# Patient Record
Sex: FEMALE | Race: WHITE | NOT HISPANIC OR LATINO
[De-identification: names, ages, dates, MRNs, and addresses within clinical notes are randomized per-mention and may not be internally consistent; named-entity substitution may affect disease eponyms.]

---

## 2022-01-01 ENCOUNTER — TRANSCRIPTION ENCOUNTER (OUTPATIENT)
Age: 82
End: 2022-01-01

## 2022-01-01 ENCOUNTER — APPOINTMENT (OUTPATIENT)
Dept: WOUND CARE | Facility: CLINIC | Age: 82
End: 2022-01-01

## 2022-01-01 ENCOUNTER — INPATIENT (INPATIENT)
Facility: HOSPITAL | Age: 82
LOS: 18 days | DRG: 853 | End: 2022-07-19
Attending: INTERNAL MEDICINE | Admitting: UROLOGY
Payer: MEDICARE

## 2022-01-01 VITALS
DIASTOLIC BLOOD PRESSURE: 45 MMHG | RESPIRATION RATE: 16 BRPM | OXYGEN SATURATION: 95 % | TEMPERATURE: 97 F | HEART RATE: 60 BPM | SYSTOLIC BLOOD PRESSURE: 65 MMHG

## 2022-01-01 VITALS
DIASTOLIC BLOOD PRESSURE: 76 MMHG | HEART RATE: 102 BPM | HEIGHT: 63 IN | SYSTOLIC BLOOD PRESSURE: 103 MMHG | BODY MASS INDEX: 31.89 KG/M2 | WEIGHT: 180 LBS | RESPIRATION RATE: 20 BRPM

## 2022-01-01 VITALS
DIASTOLIC BLOOD PRESSURE: 85 MMHG | OXYGEN SATURATION: 98 % | HEART RATE: 83 BPM | SYSTOLIC BLOOD PRESSURE: 143 MMHG | RESPIRATION RATE: 18 BRPM | TEMPERATURE: 99 F

## 2022-01-01 DIAGNOSIS — Z71.89 OTHER SPECIFIED COUNSELING: ICD-10-CM

## 2022-01-01 DIAGNOSIS — S81.801A UNSPECIFIED OPEN WOUND, RIGHT LOWER LEG, INITIAL ENCOUNTER: ICD-10-CM

## 2022-01-01 DIAGNOSIS — I10 ESSENTIAL (PRIMARY) HYPERTENSION: ICD-10-CM

## 2022-01-01 DIAGNOSIS — R41.82 ALTERED MENTAL STATUS, UNSPECIFIED: ICD-10-CM

## 2022-01-01 DIAGNOSIS — S89.90XA UNSPECIFIED INJURY OF UNSPECIFIED LOWER LEG, INITIAL ENCOUNTER: ICD-10-CM

## 2022-01-01 DIAGNOSIS — N20.0 CALCULUS OF KIDNEY: ICD-10-CM

## 2022-01-01 DIAGNOSIS — J69.0 PNEUMONITIS DUE TO INHALATION OF FOOD AND VOMIT: ICD-10-CM

## 2022-01-01 DIAGNOSIS — R14.0 ABDOMINAL DISTENSION (GASEOUS): ICD-10-CM

## 2022-01-01 DIAGNOSIS — Z78.9 OTHER SPECIFIED HEALTH STATUS: ICD-10-CM

## 2022-01-01 DIAGNOSIS — E87.1 HYPO-OSMOLALITY AND HYPONATREMIA: ICD-10-CM

## 2022-01-01 DIAGNOSIS — N20.1 CALCULUS OF URETER: ICD-10-CM

## 2022-01-01 DIAGNOSIS — Z51.5 ENCOUNTER FOR PALLIATIVE CARE: ICD-10-CM

## 2022-01-01 DIAGNOSIS — N39.0 URINARY TRACT INFECTION, SITE NOT SPECIFIED: ICD-10-CM

## 2022-01-01 DIAGNOSIS — Z87.898 PERSONAL HISTORY OF OTHER SPECIFIED CONDITIONS: ICD-10-CM

## 2022-01-01 DIAGNOSIS — J90 PLEURAL EFFUSION, NOT ELSEWHERE CLASSIFIED: ICD-10-CM

## 2022-01-01 DIAGNOSIS — Z87.442 PERSONAL HISTORY OF URINARY CALCULI: ICD-10-CM

## 2022-01-01 DIAGNOSIS — Z99.3 DEPENDENCE ON WHEELCHAIR: ICD-10-CM

## 2022-01-01 LAB
-  AMIKACIN: SIGNIFICANT CHANGE UP
-  AMOXICILLIN/CLAVULANIC ACID: SIGNIFICANT CHANGE UP
-  AMPICILLIN/SULBACTAM: SIGNIFICANT CHANGE UP
-  AMPICILLIN: SIGNIFICANT CHANGE UP
-  AZTREONAM: SIGNIFICANT CHANGE UP
-  CEFAZOLIN: SIGNIFICANT CHANGE UP
-  CEFEPIME: SIGNIFICANT CHANGE UP
-  CEFOXITIN: SIGNIFICANT CHANGE UP
-  CEFTRIAXONE: SIGNIFICANT CHANGE UP
-  CIPROFLOXACIN: SIGNIFICANT CHANGE UP
-  ERTAPENEM: SIGNIFICANT CHANGE UP
-  GENTAMICIN: SIGNIFICANT CHANGE UP
-  IMIPENEM: SIGNIFICANT CHANGE UP
-  LEVOFLOXACIN: SIGNIFICANT CHANGE UP
-  MEROPENEM: SIGNIFICANT CHANGE UP
-  NITROFURANTOIN: SIGNIFICANT CHANGE UP
-  NITROFURANTOIN: SIGNIFICANT CHANGE UP
-  PIPERACILLIN/TAZOBACTAM: SIGNIFICANT CHANGE UP
-  TETRACYCLINE: SIGNIFICANT CHANGE UP
-  TETRACYCLINE: SIGNIFICANT CHANGE UP
-  TIGECYCLINE: SIGNIFICANT CHANGE UP
-  TOBRAMYCIN: SIGNIFICANT CHANGE UP
-  TRIMETHOPRIM/SULFAMETHOXAZOLE: SIGNIFICANT CHANGE UP
-  VANCOMYCIN: SIGNIFICANT CHANGE UP
-  VANCOMYCIN: SIGNIFICANT CHANGE UP
A1C WITH ESTIMATED AVERAGE GLUCOSE RESULT: 4.9 % — SIGNIFICANT CHANGE UP (ref 4–5.6)
ALBUMIN SERPL ELPH-MCNC: 1.2 G/DL — LOW (ref 3.3–5)
ALBUMIN SERPL ELPH-MCNC: 2.2 G/DL — LOW (ref 3.3–5)
ALP SERPL-CCNC: 119 U/L — SIGNIFICANT CHANGE UP (ref 40–120)
ALP SERPL-CCNC: 129 U/L — HIGH (ref 40–120)
ALT FLD-CCNC: 16 U/L — SIGNIFICANT CHANGE UP (ref 10–45)
ALT FLD-CCNC: 28 U/L — SIGNIFICANT CHANGE UP (ref 10–45)
ANION GAP SERPL CALC-SCNC: 10 MMOL/L — SIGNIFICANT CHANGE UP (ref 5–17)
ANION GAP SERPL CALC-SCNC: 11 MMOL/L — SIGNIFICANT CHANGE UP (ref 5–17)
ANION GAP SERPL CALC-SCNC: 12 MMOL/L — SIGNIFICANT CHANGE UP (ref 5–17)
ANION GAP SERPL CALC-SCNC: 13 MMOL/L — SIGNIFICANT CHANGE UP (ref 5–17)
ANION GAP SERPL CALC-SCNC: 14 MMOL/L — SIGNIFICANT CHANGE UP (ref 5–17)
ANION GAP SERPL CALC-SCNC: 15 MMOL/L — SIGNIFICANT CHANGE UP (ref 5–17)
ANION GAP SERPL CALC-SCNC: 17 MMOL/L — SIGNIFICANT CHANGE UP (ref 5–17)
ANION GAP SERPL CALC-SCNC: 8 MMOL/L — SIGNIFICANT CHANGE UP (ref 5–17)
ANION GAP SERPL CALC-SCNC: 9 MMOL/L — SIGNIFICANT CHANGE UP (ref 5–17)
APPEARANCE UR: ABNORMAL
APPEARANCE UR: ABNORMAL
APTT BLD: 28 SEC — SIGNIFICANT CHANGE UP (ref 27.5–35.5)
AST SERPL-CCNC: 25 U/L — SIGNIFICANT CHANGE UP (ref 10–40)
AST SERPL-CCNC: 30 U/L — SIGNIFICANT CHANGE UP (ref 10–40)
BACTERIA # UR AUTO: ABNORMAL
BASOPHILS # BLD AUTO: 0.03 K/UL — SIGNIFICANT CHANGE UP (ref 0–0.2)
BASOPHILS # BLD AUTO: 0.05 K/UL — SIGNIFICANT CHANGE UP (ref 0–0.2)
BASOPHILS # BLD AUTO: 0.05 K/UL — SIGNIFICANT CHANGE UP (ref 0–0.2)
BASOPHILS NFR BLD AUTO: 0.1 % — SIGNIFICANT CHANGE UP (ref 0–2)
BASOPHILS NFR BLD AUTO: 0.4 % — SIGNIFICANT CHANGE UP (ref 0–2)
BASOPHILS NFR BLD AUTO: 0.4 % — SIGNIFICANT CHANGE UP (ref 0–2)
BILIRUB SERPL-MCNC: 0.3 MG/DL — SIGNIFICANT CHANGE UP (ref 0.2–1.2)
BILIRUB SERPL-MCNC: 0.4 MG/DL — SIGNIFICANT CHANGE UP (ref 0.2–1.2)
BILIRUB UR-MCNC: NEGATIVE — SIGNIFICANT CHANGE UP
BILIRUB UR-MCNC: NEGATIVE — SIGNIFICANT CHANGE UP
BLD GP AB SCN SERPL QL: NEGATIVE — SIGNIFICANT CHANGE UP
BUN SERPL-MCNC: 10 MG/DL — SIGNIFICANT CHANGE UP (ref 7–23)
BUN SERPL-MCNC: 10 MG/DL — SIGNIFICANT CHANGE UP (ref 7–23)
BUN SERPL-MCNC: 17 MG/DL — SIGNIFICANT CHANGE UP (ref 7–23)
BUN SERPL-MCNC: 20 MG/DL — SIGNIFICANT CHANGE UP (ref 7–23)
BUN SERPL-MCNC: 30 MG/DL — HIGH (ref 7–23)
BUN SERPL-MCNC: 34 MG/DL — HIGH (ref 7–23)
BUN SERPL-MCNC: 37 MG/DL — HIGH (ref 7–23)
BUN SERPL-MCNC: 8 MG/DL — SIGNIFICANT CHANGE UP (ref 7–23)
BUN SERPL-MCNC: 9 MG/DL — SIGNIFICANT CHANGE UP (ref 7–23)
CALCIUM SERPL-MCNC: 7.5 MG/DL — LOW (ref 8.4–10.5)
CALCIUM SERPL-MCNC: 7.5 MG/DL — LOW (ref 8.4–10.5)
CALCIUM SERPL-MCNC: 7.6 MG/DL — LOW (ref 8.4–10.5)
CALCIUM SERPL-MCNC: 7.7 MG/DL — LOW (ref 8.4–10.5)
CALCIUM SERPL-MCNC: 7.8 MG/DL — LOW (ref 8.4–10.5)
CALCIUM SERPL-MCNC: 8.1 MG/DL — LOW (ref 8.4–10.5)
CALCIUM SERPL-MCNC: 8.3 MG/DL — LOW (ref 8.4–10.5)
CALCIUM SERPL-MCNC: 8.4 MG/DL — SIGNIFICANT CHANGE UP (ref 8.4–10.5)
CALCIUM SERPL-MCNC: 8.5 MG/DL — SIGNIFICANT CHANGE UP (ref 8.4–10.5)
CALCIUM SERPL-MCNC: 9 MG/DL — SIGNIFICANT CHANGE UP (ref 8.4–10.5)
CALCIUM SERPL-MCNC: 9.5 MG/DL — SIGNIFICANT CHANGE UP (ref 8.4–10.5)
CALCIUM SERPL-MCNC: <3 MG/DL — CRITICAL LOW (ref 8.4–10.5)
CHLORIDE SERPL-SCNC: 101 MMOL/L — SIGNIFICANT CHANGE UP (ref 96–108)
CHLORIDE SERPL-SCNC: 103 MMOL/L — SIGNIFICANT CHANGE UP (ref 96–108)
CHLORIDE SERPL-SCNC: 103 MMOL/L — SIGNIFICANT CHANGE UP (ref 96–108)
CHLORIDE SERPL-SCNC: 104 MMOL/L — SIGNIFICANT CHANGE UP (ref 96–108)
CHLORIDE SERPL-SCNC: 104 MMOL/L — SIGNIFICANT CHANGE UP (ref 96–108)
CHLORIDE SERPL-SCNC: 95 MMOL/L — LOW (ref 96–108)
CHLORIDE SERPL-SCNC: 96 MMOL/L — SIGNIFICANT CHANGE UP (ref 96–108)
CHLORIDE SERPL-SCNC: 96 MMOL/L — SIGNIFICANT CHANGE UP (ref 96–108)
CHLORIDE SERPL-SCNC: 97 MMOL/L — SIGNIFICANT CHANGE UP (ref 96–108)
CHLORIDE SERPL-SCNC: 97 MMOL/L — SIGNIFICANT CHANGE UP (ref 96–108)
CHLORIDE SERPL-SCNC: 98 MMOL/L — SIGNIFICANT CHANGE UP (ref 96–108)
CHLORIDE SERPL-SCNC: 99 MMOL/L — SIGNIFICANT CHANGE UP (ref 96–108)
CO2 SERPL-SCNC: 11 MMOL/L — LOW (ref 22–31)
CO2 SERPL-SCNC: 13 MMOL/L — LOW (ref 22–31)
CO2 SERPL-SCNC: 13 MMOL/L — LOW (ref 22–31)
CO2 SERPL-SCNC: 14 MMOL/L — LOW (ref 22–31)
CO2 SERPL-SCNC: 14 MMOL/L — LOW (ref 22–31)
CO2 SERPL-SCNC: 15 MMOL/L — LOW (ref 22–31)
CO2 SERPL-SCNC: 15 MMOL/L — LOW (ref 22–31)
CO2 SERPL-SCNC: 16 MMOL/L — LOW (ref 22–31)
CO2 SERPL-SCNC: 16 MMOL/L — LOW (ref 22–31)
CO2 SERPL-SCNC: 17 MMOL/L — LOW (ref 22–31)
CO2 SERPL-SCNC: 18 MMOL/L — LOW (ref 22–31)
CO2 SERPL-SCNC: 18 MMOL/L — LOW (ref 22–31)
CO2 SERPL-SCNC: 19 MMOL/L — LOW (ref 22–31)
CO2 SERPL-SCNC: 20 MMOL/L — LOW (ref 22–31)
CO2 SERPL-SCNC: 20 MMOL/L — LOW (ref 22–31)
COLOR SPEC: YELLOW — SIGNIFICANT CHANGE UP
COLOR SPEC: YELLOW — SIGNIFICANT CHANGE UP
COMMENT - URINE: SIGNIFICANT CHANGE UP
CORTIS AM PEAK SERPL-MCNC: 29 UG/DL — HIGH (ref 6–18.4)
CREAT SERPL-MCNC: 0.48 MG/DL — LOW (ref 0.5–1.3)
CREAT SERPL-MCNC: 0.49 MG/DL — LOW (ref 0.5–1.3)
CREAT SERPL-MCNC: 0.51 MG/DL — SIGNIFICANT CHANGE UP (ref 0.5–1.3)
CREAT SERPL-MCNC: 0.51 MG/DL — SIGNIFICANT CHANGE UP (ref 0.5–1.3)
CREAT SERPL-MCNC: 0.52 MG/DL — SIGNIFICANT CHANGE UP (ref 0.5–1.3)
CREAT SERPL-MCNC: 0.54 MG/DL — SIGNIFICANT CHANGE UP (ref 0.5–1.3)
CREAT SERPL-MCNC: 0.55 MG/DL — SIGNIFICANT CHANGE UP (ref 0.5–1.3)
CREAT SERPL-MCNC: 0.55 MG/DL — SIGNIFICANT CHANGE UP (ref 0.5–1.3)
CREAT SERPL-MCNC: 0.58 MG/DL — SIGNIFICANT CHANGE UP (ref 0.5–1.3)
CREAT SERPL-MCNC: 0.62 MG/DL — SIGNIFICANT CHANGE UP (ref 0.5–1.3)
CREAT SERPL-MCNC: 0.64 MG/DL — SIGNIFICANT CHANGE UP (ref 0.5–1.3)
CREAT SERPL-MCNC: 0.64 MG/DL — SIGNIFICANT CHANGE UP (ref 0.5–1.3)
CREAT SERPL-MCNC: 0.67 MG/DL — SIGNIFICANT CHANGE UP (ref 0.5–1.3)
CREAT SERPL-MCNC: 0.68 MG/DL — SIGNIFICANT CHANGE UP (ref 0.5–1.3)
CREAT SERPL-MCNC: 0.69 MG/DL — SIGNIFICANT CHANGE UP (ref 0.5–1.3)
CREAT SERPL-MCNC: 0.78 MG/DL — SIGNIFICANT CHANGE UP (ref 0.5–1.3)
CREAT SERPL-MCNC: 1.19 MG/DL — SIGNIFICANT CHANGE UP (ref 0.5–1.3)
CULTURE RESULTS: SIGNIFICANT CHANGE UP
DIFF PNL FLD: ABNORMAL
DIFF PNL FLD: ABNORMAL
EGFR: 47 ML/MIN/1.73M2 — LOW
EGFR: 77 ML/MIN/1.73M2 — SIGNIFICANT CHANGE UP
EGFR: 88 ML/MIN/1.73M2 — SIGNIFICANT CHANGE UP
EGFR: 89 ML/MIN/1.73M2 — SIGNIFICANT CHANGE UP
EGFR: 89 ML/MIN/1.73M2 — SIGNIFICANT CHANGE UP
EGFR: 90 ML/MIN/1.73M2 — SIGNIFICANT CHANGE UP
EGFR: 90 ML/MIN/1.73M2 — SIGNIFICANT CHANGE UP
EGFR: 91 ML/MIN/1.73M2 — SIGNIFICANT CHANGE UP
EGFR: 92 ML/MIN/1.73M2 — SIGNIFICANT CHANGE UP
EGFR: 93 ML/MIN/1.73M2 — SIGNIFICANT CHANGE UP
EGFR: 93 ML/MIN/1.73M2 — SIGNIFICANT CHANGE UP
EGFR: 94 ML/MIN/1.73M2 — SIGNIFICANT CHANGE UP
EGFR: 94 ML/MIN/1.73M2 — SIGNIFICANT CHANGE UP
EGFR: 95 ML/MIN/1.73M2 — SIGNIFICANT CHANGE UP
EGFR: 95 ML/MIN/1.73M2 — SIGNIFICANT CHANGE UP
EGFR: 96 ML/MIN/1.73M2 — SIGNIFICANT CHANGE UP
EGFR: 96 ML/MIN/1.73M2 — SIGNIFICANT CHANGE UP
EOSINOPHIL # BLD AUTO: 0 K/UL — SIGNIFICANT CHANGE UP (ref 0–0.5)
EOSINOPHIL # BLD AUTO: 0.07 K/UL — SIGNIFICANT CHANGE UP (ref 0–0.5)
EOSINOPHIL # BLD AUTO: 0.11 K/UL — SIGNIFICANT CHANGE UP (ref 0–0.5)
EOSINOPHIL NFR BLD AUTO: 0 % — SIGNIFICANT CHANGE UP (ref 0–6)
EOSINOPHIL NFR BLD AUTO: 0.5 % — SIGNIFICANT CHANGE UP (ref 0–6)
EOSINOPHIL NFR BLD AUTO: 0.9 % — SIGNIFICANT CHANGE UP (ref 0–6)
EPI CELLS # UR: 3 /HPF — SIGNIFICANT CHANGE UP
ESTIMATED AVERAGE GLUCOSE: 94 MG/DL — SIGNIFICANT CHANGE UP (ref 68–114)
GAS PNL BLDA: SIGNIFICANT CHANGE UP
GLUCOSE BLDC GLUCOMTR-MCNC: 119 MG/DL — HIGH (ref 70–99)
GLUCOSE BLDC GLUCOMTR-MCNC: 206 MG/DL — HIGH (ref 70–99)
GLUCOSE BLDC GLUCOMTR-MCNC: 56 MG/DL — LOW (ref 70–99)
GLUCOSE BLDC GLUCOMTR-MCNC: 62 MG/DL — LOW (ref 70–99)
GLUCOSE BLDC GLUCOMTR-MCNC: 64 MG/DL — LOW (ref 70–99)
GLUCOSE BLDC GLUCOMTR-MCNC: 66 MG/DL — LOW (ref 70–99)
GLUCOSE BLDC GLUCOMTR-MCNC: 84 MG/DL — SIGNIFICANT CHANGE UP (ref 70–99)
GLUCOSE BLDC GLUCOMTR-MCNC: 85 MG/DL — SIGNIFICANT CHANGE UP (ref 70–99)
GLUCOSE BLDC GLUCOMTR-MCNC: 88 MG/DL — SIGNIFICANT CHANGE UP (ref 70–99)
GLUCOSE BLDC GLUCOMTR-MCNC: 94 MG/DL — SIGNIFICANT CHANGE UP (ref 70–99)
GLUCOSE BLDC GLUCOMTR-MCNC: 97 MG/DL — SIGNIFICANT CHANGE UP (ref 70–99)
GLUCOSE SERPL-MCNC: 104 MG/DL — HIGH (ref 70–99)
GLUCOSE SERPL-MCNC: 118 MG/DL — HIGH (ref 70–99)
GLUCOSE SERPL-MCNC: 120 MG/DL — HIGH (ref 70–99)
GLUCOSE SERPL-MCNC: 127 MG/DL — HIGH (ref 70–99)
GLUCOSE SERPL-MCNC: 128 MG/DL — HIGH (ref 70–99)
GLUCOSE SERPL-MCNC: 139 MG/DL — HIGH (ref 70–99)
GLUCOSE SERPL-MCNC: 45 MG/DL — CRITICAL LOW (ref 70–99)
GLUCOSE SERPL-MCNC: 49 MG/DL — CRITICAL LOW (ref 70–99)
GLUCOSE SERPL-MCNC: 57 MG/DL — LOW (ref 70–99)
GLUCOSE SERPL-MCNC: 59 MG/DL — LOW (ref 70–99)
GLUCOSE SERPL-MCNC: 72 MG/DL — SIGNIFICANT CHANGE UP (ref 70–99)
GLUCOSE SERPL-MCNC: 77 MG/DL — SIGNIFICANT CHANGE UP (ref 70–99)
GLUCOSE SERPL-MCNC: 85 MG/DL — SIGNIFICANT CHANGE UP (ref 70–99)
GLUCOSE SERPL-MCNC: 87 MG/DL — SIGNIFICANT CHANGE UP (ref 70–99)
GLUCOSE SERPL-MCNC: 88 MG/DL — SIGNIFICANT CHANGE UP (ref 70–99)
GLUCOSE SERPL-MCNC: 90 MG/DL — SIGNIFICANT CHANGE UP (ref 70–99)
GLUCOSE SERPL-MCNC: 93 MG/DL — SIGNIFICANT CHANGE UP (ref 70–99)
GLUCOSE UR QL: NEGATIVE — SIGNIFICANT CHANGE UP
GLUCOSE UR QL: NEGATIVE — SIGNIFICANT CHANGE UP
HCT VFR BLD CALC: 29.5 % — LOW (ref 34.5–45)
HCT VFR BLD CALC: 31.5 % — LOW (ref 34.5–45)
HCT VFR BLD CALC: 32.8 % — LOW (ref 34.5–45)
HCT VFR BLD CALC: 33 % — LOW (ref 34.5–45)
HCT VFR BLD CALC: 33.9 % — LOW (ref 34.5–45)
HCT VFR BLD CALC: 34 % — LOW (ref 34.5–45)
HCT VFR BLD CALC: 39 % — SIGNIFICANT CHANGE UP (ref 34.5–45)
HCT VFR BLD CALC: 39.9 % — SIGNIFICANT CHANGE UP (ref 34.5–45)
HCT VFR BLD CALC: 41.1 % — SIGNIFICANT CHANGE UP (ref 34.5–45)
HCT VFR BLD CALC: 43.9 % — SIGNIFICANT CHANGE UP (ref 34.5–45)
HCT VFR BLD CALC: SIGNIFICANT CHANGE UP (ref 34.5–45)
HGB BLD-MCNC: 10.8 G/DL — LOW (ref 11.5–15.5)
HGB BLD-MCNC: 10.8 G/DL — LOW (ref 11.5–15.5)
HGB BLD-MCNC: 11.6 G/DL — SIGNIFICANT CHANGE UP (ref 11.5–15.5)
HGB BLD-MCNC: 11.8 G/DL — SIGNIFICANT CHANGE UP (ref 11.5–15.5)
HGB BLD-MCNC: 12 G/DL — SIGNIFICANT CHANGE UP (ref 11.5–15.5)
HGB BLD-MCNC: 12.2 G/DL — SIGNIFICANT CHANGE UP (ref 11.5–15.5)
HGB BLD-MCNC: 12.4 G/DL — SIGNIFICANT CHANGE UP (ref 11.5–15.5)
HGB BLD-MCNC: 12.7 G/DL — SIGNIFICANT CHANGE UP (ref 11.5–15.5)
HGB BLD-MCNC: 12.9 G/DL — SIGNIFICANT CHANGE UP (ref 11.5–15.5)
HGB BLD-MCNC: 13.5 G/DL — SIGNIFICANT CHANGE UP (ref 11.5–15.5)
HGB BLD-MCNC: 14.5 G/DL — SIGNIFICANT CHANGE UP (ref 11.5–15.5)
HYALINE CASTS # UR AUTO: 0 /LPF — SIGNIFICANT CHANGE UP (ref 0–2)
IMM GRANULOCYTES NFR BLD AUTO: 0.5 % — SIGNIFICANT CHANGE UP (ref 0–1.5)
IMM GRANULOCYTES NFR BLD AUTO: 0.8 % — SIGNIFICANT CHANGE UP (ref 0–1.5)
IMM GRANULOCYTES NFR BLD AUTO: 0.8 % — SIGNIFICANT CHANGE UP (ref 0–1.5)
INR BLD: 1.02 RATIO — SIGNIFICANT CHANGE UP (ref 0.88–1.16)
KETONES UR-MCNC: NEGATIVE — SIGNIFICANT CHANGE UP
KETONES UR-MCNC: SIGNIFICANT CHANGE UP
LEUKOCYTE ESTERASE UR-ACNC: ABNORMAL
LEUKOCYTE ESTERASE UR-ACNC: ABNORMAL
LIDOCAIN IGE QN: 23 U/L — SIGNIFICANT CHANGE UP (ref 7–60)
LYMPHOCYTES # BLD AUTO: 1.54 K/UL — SIGNIFICANT CHANGE UP (ref 1–3.3)
LYMPHOCYTES # BLD AUTO: 1.9 K/UL — SIGNIFICANT CHANGE UP (ref 1–3.3)
LYMPHOCYTES # BLD AUTO: 15.5 % — SIGNIFICANT CHANGE UP (ref 13–44)
LYMPHOCYTES # BLD AUTO: 18.3 % — SIGNIFICANT CHANGE UP (ref 13–44)
LYMPHOCYTES # BLD AUTO: 2.4 K/UL — SIGNIFICANT CHANGE UP (ref 1–3.3)
LYMPHOCYTES # BLD AUTO: 7.6 % — LOW (ref 13–44)
MAGNESIUM SERPL-MCNC: 1 MG/DL — CRITICAL LOW (ref 1.6–2.6)
MAGNESIUM SERPL-MCNC: 1.4 MG/DL — LOW (ref 1.6–2.6)
MAGNESIUM SERPL-MCNC: 1.8 MG/DL — SIGNIFICANT CHANGE UP (ref 1.6–2.6)
MAGNESIUM SERPL-MCNC: 1.9 MG/DL — SIGNIFICANT CHANGE UP (ref 1.6–2.6)
MAGNESIUM SERPL-MCNC: 2.1 MG/DL — SIGNIFICANT CHANGE UP (ref 1.6–2.6)
MCHC RBC-ENTMCNC: 32.3 GM/DL — SIGNIFICANT CHANGE UP (ref 32–36)
MCHC RBC-ENTMCNC: 32.6 GM/DL — SIGNIFICANT CHANGE UP (ref 32–36)
MCHC RBC-ENTMCNC: 32.7 PG — SIGNIFICANT CHANGE UP (ref 27–34)
MCHC RBC-ENTMCNC: 32.8 GM/DL — SIGNIFICANT CHANGE UP (ref 32–36)
MCHC RBC-ENTMCNC: 32.9 GM/DL — SIGNIFICANT CHANGE UP (ref 32–36)
MCHC RBC-ENTMCNC: 32.9 PG — SIGNIFICANT CHANGE UP (ref 27–34)
MCHC RBC-ENTMCNC: 32.9 PG — SIGNIFICANT CHANGE UP (ref 27–34)
MCHC RBC-ENTMCNC: 33 GM/DL — SIGNIFICANT CHANGE UP (ref 32–36)
MCHC RBC-ENTMCNC: 33.2 PG — SIGNIFICANT CHANGE UP (ref 27–34)
MCHC RBC-ENTMCNC: 33.3 PG — SIGNIFICANT CHANGE UP (ref 27–34)
MCHC RBC-ENTMCNC: 33.4 PG — SIGNIFICANT CHANGE UP (ref 27–34)
MCHC RBC-ENTMCNC: 33.5 PG — SIGNIFICANT CHANGE UP (ref 27–34)
MCHC RBC-ENTMCNC: 33.6 PG — SIGNIFICANT CHANGE UP (ref 27–34)
MCHC RBC-ENTMCNC: 35.4 GM/DL — SIGNIFICANT CHANGE UP (ref 32–36)
MCHC RBC-ENTMCNC: 36.5 GM/DL — HIGH (ref 32–36)
MCHC RBC-ENTMCNC: 36.6 GM/DL — HIGH (ref 32–36)
MCHC RBC-ENTMCNC: 36.8 GM/DL — HIGH (ref 32–36)
MCHC RBC-ENTMCNC: 37 GM/DL — HIGH (ref 32–36)
MCHC RBC-ENTMCNC: SIGNIFICANT CHANGE UP (ref 27–34)
MCHC RBC-ENTMCNC: SIGNIFICANT CHANGE UP (ref 32–36)
MCV RBC AUTO: 100.2 FL — HIGH (ref 80–100)
MCV RBC AUTO: 101.9 FL — HIGH (ref 80–100)
MCV RBC AUTO: 102.1 FL — HIGH (ref 80–100)
MCV RBC AUTO: 103.4 FL — HIGH (ref 80–100)
MCV RBC AUTO: 89.9 FL — SIGNIFICANT CHANGE UP (ref 80–100)
MCV RBC AUTO: 90.2 FL — SIGNIFICANT CHANGE UP (ref 80–100)
MCV RBC AUTO: 91 FL — SIGNIFICANT CHANGE UP (ref 80–100)
MCV RBC AUTO: 91.3 FL — SIGNIFICANT CHANGE UP (ref 80–100)
MCV RBC AUTO: 93.9 FL — SIGNIFICANT CHANGE UP (ref 80–100)
MCV RBC AUTO: 99.1 FL — SIGNIFICANT CHANGE UP (ref 80–100)
MCV RBC AUTO: SIGNIFICANT CHANGE UP (ref 80–100)
METHOD TYPE: SIGNIFICANT CHANGE UP
MONOCYTES # BLD AUTO: 0.87 K/UL — SIGNIFICANT CHANGE UP (ref 0–0.9)
MONOCYTES # BLD AUTO: 0.88 K/UL — SIGNIFICANT CHANGE UP (ref 0–0.9)
MONOCYTES # BLD AUTO: 0.92 K/UL — HIGH (ref 0–0.9)
MONOCYTES NFR BLD AUTO: 4.3 % — SIGNIFICANT CHANGE UP (ref 2–14)
MONOCYTES NFR BLD AUTO: 7 % — SIGNIFICANT CHANGE UP (ref 2–14)
MONOCYTES NFR BLD AUTO: 7.2 % — SIGNIFICANT CHANGE UP (ref 2–14)
NEUTROPHILS # BLD AUTO: 17.71 K/UL — HIGH (ref 1.8–7.4)
NEUTROPHILS # BLD AUTO: 9.2 K/UL — HIGH (ref 1.8–7.4)
NEUTROPHILS # BLD AUTO: 9.6 K/UL — HIGH (ref 1.8–7.4)
NEUTROPHILS NFR BLD AUTO: 73 % — SIGNIFICANT CHANGE UP (ref 43–77)
NEUTROPHILS NFR BLD AUTO: 75.2 % — SIGNIFICANT CHANGE UP (ref 43–77)
NEUTROPHILS NFR BLD AUTO: 87.5 % — HIGH (ref 43–77)
NITRITE UR-MCNC: NEGATIVE — SIGNIFICANT CHANGE UP
NITRITE UR-MCNC: NEGATIVE — SIGNIFICANT CHANGE UP
NRBC # BLD: 0 /100 WBCS — SIGNIFICANT CHANGE UP (ref 0–0)
NT-PROBNP SERPL-SCNC: 1512 PG/ML — HIGH (ref 0–300)
ORGANISM # SPEC MICROSCOPIC CNT: SIGNIFICANT CHANGE UP
OSMOLALITY UR: 320 MOS/KG — SIGNIFICANT CHANGE UP (ref 300–900)
PH UR: 6 — SIGNIFICANT CHANGE UP (ref 5–8)
PH UR: 6 — SIGNIFICANT CHANGE UP (ref 5–8)
PHOSPHATE SERPL-MCNC: 1.4 MG/DL — LOW (ref 2.5–4.5)
PHOSPHATE SERPL-MCNC: 2.6 MG/DL — SIGNIFICANT CHANGE UP (ref 2.5–4.5)
PHOSPHATE SERPL-MCNC: 2.9 MG/DL — SIGNIFICANT CHANGE UP (ref 2.5–4.5)
PHOSPHATE SERPL-MCNC: 3.1 MG/DL — SIGNIFICANT CHANGE UP (ref 2.5–4.5)
PLATELET # BLD AUTO: 362 K/UL — SIGNIFICANT CHANGE UP (ref 150–400)
PLATELET # BLD AUTO: 380 K/UL — SIGNIFICANT CHANGE UP (ref 150–400)
PLATELET # BLD AUTO: 392 K/UL — SIGNIFICANT CHANGE UP (ref 150–400)
PLATELET # BLD AUTO: 395 K/UL — SIGNIFICANT CHANGE UP (ref 150–400)
PLATELET # BLD AUTO: 404 K/UL — HIGH (ref 150–400)
PLATELET # BLD AUTO: 425 K/UL — HIGH (ref 150–400)
PLATELET # BLD AUTO: 440 K/UL — HIGH (ref 150–400)
PLATELET # BLD AUTO: 457 K/UL — HIGH (ref 150–400)
PLATELET # BLD AUTO: 463 K/UL — HIGH (ref 150–400)
PLATELET # BLD AUTO: 591 K/UL — HIGH (ref 150–400)
PLATELET # BLD AUTO: 619 K/UL — HIGH (ref 150–400)
POTASSIUM SERPL-MCNC: 2.7 MMOL/L — CRITICAL LOW (ref 3.5–5.3)
POTASSIUM SERPL-MCNC: 2.7 MMOL/L — CRITICAL LOW (ref 3.5–5.3)
POTASSIUM SERPL-MCNC: 2.9 MMOL/L — CRITICAL LOW (ref 3.5–5.3)
POTASSIUM SERPL-MCNC: 3.1 MMOL/L — LOW (ref 3.5–5.3)
POTASSIUM SERPL-MCNC: 3.4 MMOL/L — LOW (ref 3.5–5.3)
POTASSIUM SERPL-MCNC: 3.5 MMOL/L — SIGNIFICANT CHANGE UP (ref 3.5–5.3)
POTASSIUM SERPL-MCNC: 3.5 MMOL/L — SIGNIFICANT CHANGE UP (ref 3.5–5.3)
POTASSIUM SERPL-MCNC: 3.6 MMOL/L — SIGNIFICANT CHANGE UP (ref 3.5–5.3)
POTASSIUM SERPL-MCNC: 3.7 MMOL/L — SIGNIFICANT CHANGE UP (ref 3.5–5.3)
POTASSIUM SERPL-MCNC: 3.8 MMOL/L — SIGNIFICANT CHANGE UP (ref 3.5–5.3)
POTASSIUM SERPL-MCNC: 3.9 MMOL/L — SIGNIFICANT CHANGE UP (ref 3.5–5.3)
POTASSIUM SERPL-MCNC: 3.9 MMOL/L — SIGNIFICANT CHANGE UP (ref 3.5–5.3)
POTASSIUM SERPL-MCNC: 4.5 MMOL/L — SIGNIFICANT CHANGE UP (ref 3.5–5.3)
POTASSIUM SERPL-MCNC: 4.6 MMOL/L — SIGNIFICANT CHANGE UP (ref 3.5–5.3)
POTASSIUM SERPL-MCNC: 4.7 MMOL/L — SIGNIFICANT CHANGE UP (ref 3.5–5.3)
POTASSIUM SERPL-MCNC: 4.8 MMOL/L — SIGNIFICANT CHANGE UP (ref 3.5–5.3)
POTASSIUM SERPL-MCNC: 8 MMOL/L — CRITICAL HIGH (ref 3.5–5.3)
POTASSIUM SERPL-SCNC: 2.7 MMOL/L — CRITICAL LOW (ref 3.5–5.3)
POTASSIUM SERPL-SCNC: 2.7 MMOL/L — CRITICAL LOW (ref 3.5–5.3)
POTASSIUM SERPL-SCNC: 2.9 MMOL/L — CRITICAL LOW (ref 3.5–5.3)
POTASSIUM SERPL-SCNC: 3.1 MMOL/L — LOW (ref 3.5–5.3)
POTASSIUM SERPL-SCNC: 3.4 MMOL/L — LOW (ref 3.5–5.3)
POTASSIUM SERPL-SCNC: 3.5 MMOL/L — SIGNIFICANT CHANGE UP (ref 3.5–5.3)
POTASSIUM SERPL-SCNC: 3.5 MMOL/L — SIGNIFICANT CHANGE UP (ref 3.5–5.3)
POTASSIUM SERPL-SCNC: 3.6 MMOL/L — SIGNIFICANT CHANGE UP (ref 3.5–5.3)
POTASSIUM SERPL-SCNC: 3.7 MMOL/L — SIGNIFICANT CHANGE UP (ref 3.5–5.3)
POTASSIUM SERPL-SCNC: 3.8 MMOL/L — SIGNIFICANT CHANGE UP (ref 3.5–5.3)
POTASSIUM SERPL-SCNC: 3.9 MMOL/L — SIGNIFICANT CHANGE UP (ref 3.5–5.3)
POTASSIUM SERPL-SCNC: 3.9 MMOL/L — SIGNIFICANT CHANGE UP (ref 3.5–5.3)
POTASSIUM SERPL-SCNC: 4.5 MMOL/L — SIGNIFICANT CHANGE UP (ref 3.5–5.3)
POTASSIUM SERPL-SCNC: 4.6 MMOL/L — SIGNIFICANT CHANGE UP (ref 3.5–5.3)
POTASSIUM SERPL-SCNC: 4.7 MMOL/L — SIGNIFICANT CHANGE UP (ref 3.5–5.3)
POTASSIUM SERPL-SCNC: 4.8 MMOL/L — SIGNIFICANT CHANGE UP (ref 3.5–5.3)
POTASSIUM SERPL-SCNC: 8 MMOL/L — CRITICAL HIGH (ref 3.5–5.3)
PROT SERPL-MCNC: 3.4 G/DL — LOW (ref 6–8.3)
PROT SERPL-MCNC: 5.1 G/DL — LOW (ref 6–8.3)
PROT UR-MCNC: ABNORMAL
PROT UR-MCNC: ABNORMAL
PROTHROM AB SERPL-ACNC: 11.7 SEC — SIGNIFICANT CHANGE UP (ref 10.5–13.4)
RBC # BLD: 3.22 M/UL — LOW (ref 3.8–5.2)
RBC # BLD: 3.24 M/UL — LOW (ref 3.8–5.2)
RBC # BLD: 3.45 M/UL — LOW (ref 3.8–5.2)
RBC # BLD: 3.61 M/UL — LOW (ref 3.8–5.2)
RBC # BLD: 3.67 M/UL — LOW (ref 3.8–5.2)
RBC # BLD: 3.77 M/UL — LOW (ref 3.8–5.2)
RBC # BLD: 3.82 M/UL — SIGNIFICANT CHANGE UP (ref 3.8–5.2)
RBC # BLD: 3.86 M/UL — SIGNIFICANT CHANGE UP (ref 3.8–5.2)
RBC # BLD: 4.1 M/UL — SIGNIFICANT CHANGE UP (ref 3.8–5.2)
RBC # BLD: 4.43 M/UL — SIGNIFICANT CHANGE UP (ref 3.8–5.2)
RBC # BLD: SIGNIFICANT CHANGE UP (ref 3.8–5.2)
RBC # FLD: 13.5 % — SIGNIFICANT CHANGE UP (ref 10.3–14.5)
RBC # FLD: 13.7 % — SIGNIFICANT CHANGE UP (ref 10.3–14.5)
RBC # FLD: 14.6 % — HIGH (ref 10.3–14.5)
RBC # FLD: 15.1 % — HIGH (ref 10.3–14.5)
RBC # FLD: 15.5 % — HIGH (ref 10.3–14.5)
RBC # FLD: 15.8 % — HIGH (ref 10.3–14.5)
RBC # FLD: 16 % — HIGH (ref 10.3–14.5)
RBC # FLD: 16.2 % — HIGH (ref 10.3–14.5)
RBC # FLD: SIGNIFICANT CHANGE UP (ref 10.3–14.5)
RBC CASTS # UR COMP ASSIST: 1 /HPF — SIGNIFICANT CHANGE UP (ref 0–4)
RH IG SCN BLD-IMP: NEGATIVE — SIGNIFICANT CHANGE UP
SARS-COV-2 RNA SPEC QL NAA+PROBE: SIGNIFICANT CHANGE UP
SODIUM SERPL-SCNC: 121 MMOL/L — LOW (ref 135–145)
SODIUM SERPL-SCNC: 122 MMOL/L — LOW (ref 135–145)
SODIUM SERPL-SCNC: 123 MMOL/L — LOW (ref 135–145)
SODIUM SERPL-SCNC: 124 MMOL/L — LOW (ref 135–145)
SODIUM SERPL-SCNC: 125 MMOL/L — LOW (ref 135–145)
SODIUM SERPL-SCNC: 126 MMOL/L — LOW (ref 135–145)
SODIUM SERPL-SCNC: 131 MMOL/L — LOW (ref 135–145)
SODIUM SERPL-SCNC: 132 MMOL/L — LOW (ref 135–145)
SODIUM SERPL-SCNC: 133 MMOL/L — LOW (ref 135–145)
SODIUM SERPL-SCNC: 137 MMOL/L — SIGNIFICANT CHANGE UP (ref 135–145)
SODIUM UR-SCNC: 34 MMOL/L — SIGNIFICANT CHANGE UP
SP GR SPEC: 1.02 — SIGNIFICANT CHANGE UP (ref 1.01–1.02)
SP GR SPEC: 1.02 — SIGNIFICANT CHANGE UP (ref 1.01–1.02)
SPECIMEN SOURCE: SIGNIFICANT CHANGE UP
TSH SERPL-MCNC: 6.11 UIU/ML — HIGH (ref 0.27–4.2)
UROBILINOGEN FLD QL: NEGATIVE — SIGNIFICANT CHANGE UP
UROBILINOGEN FLD QL: NEGATIVE — SIGNIFICANT CHANGE UP
WBC # BLD: 11.02 K/UL — HIGH (ref 3.8–10.5)
WBC # BLD: 12.24 K/UL — HIGH (ref 3.8–10.5)
WBC # BLD: 13.14 K/UL — HIGH (ref 3.8–10.5)
WBC # BLD: 13.28 K/UL — HIGH (ref 3.8–10.5)
WBC # BLD: 14.66 K/UL — HIGH (ref 3.8–10.5)
WBC # BLD: 15.2 K/UL — HIGH (ref 3.8–10.5)
WBC # BLD: 16.42 K/UL — HIGH (ref 3.8–10.5)
WBC # BLD: 16.52 K/UL — HIGH (ref 3.8–10.5)
WBC # BLD: 16.84 K/UL — HIGH (ref 3.8–10.5)
WBC # BLD: 16.97 K/UL — HIGH (ref 3.8–10.5)
WBC # BLD: 20.25 K/UL — HIGH (ref 3.8–10.5)
WBC # FLD AUTO: 11.02 K/UL — HIGH (ref 3.8–10.5)
WBC # FLD AUTO: 12.24 K/UL — HIGH (ref 3.8–10.5)
WBC # FLD AUTO: 13.14 K/UL — HIGH (ref 3.8–10.5)
WBC # FLD AUTO: 13.28 K/UL — HIGH (ref 3.8–10.5)
WBC # FLD AUTO: 14.66 K/UL — HIGH (ref 3.8–10.5)
WBC # FLD AUTO: 15.2 K/UL — HIGH (ref 3.8–10.5)
WBC # FLD AUTO: 16.42 K/UL — HIGH (ref 3.8–10.5)
WBC # FLD AUTO: 16.52 K/UL — HIGH (ref 3.8–10.5)
WBC # FLD AUTO: 16.84 K/UL — HIGH (ref 3.8–10.5)
WBC # FLD AUTO: 16.97 K/UL — HIGH (ref 3.8–10.5)
WBC # FLD AUTO: 20.25 K/UL — HIGH (ref 3.8–10.5)
WBC UR QL: >50

## 2022-01-01 PROCEDURE — 99285 EMERGENCY DEPT VISIT HI MDM: CPT | Mod: 25

## 2022-01-01 PROCEDURE — 83036 HEMOGLOBIN GLYCOSYLATED A1C: CPT

## 2022-01-01 PROCEDURE — 99232 SBSQ HOSP IP/OBS MODERATE 35: CPT

## 2022-01-01 PROCEDURE — U0003: CPT

## 2022-01-01 PROCEDURE — 83735 ASSAY OF MAGNESIUM: CPT

## 2022-01-01 PROCEDURE — 93306 TTE W/DOPPLER COMPLETE: CPT

## 2022-01-01 PROCEDURE — U0005: CPT

## 2022-01-01 PROCEDURE — 80048 BASIC METABOLIC PNL TOTAL CA: CPT

## 2022-01-01 PROCEDURE — 99232 SBSQ HOSP IP/OBS MODERATE 35: CPT | Mod: FS

## 2022-01-01 PROCEDURE — 93010 ELECTROCARDIOGRAM REPORT: CPT

## 2022-01-01 PROCEDURE — 11042 DBRDMT SUBQ TIS 1ST 20SQCM/<: CPT

## 2022-01-01 PROCEDURE — 71045 X-RAY EXAM CHEST 1 VIEW: CPT | Mod: 26

## 2022-01-01 PROCEDURE — 76000 FLUOROSCOPY <1 HR PHYS/QHP: CPT

## 2022-01-01 PROCEDURE — 74177 CT ABD & PELVIS W/CONTRAST: CPT | Mod: 26,MA

## 2022-01-01 PROCEDURE — 99285 EMERGENCY DEPT VISIT HI MDM: CPT | Mod: FS

## 2022-01-01 PROCEDURE — 99204 OFFICE O/P NEW MOD 45 MIN: CPT | Mod: 25

## 2022-01-01 PROCEDURE — 52332 CYSTOSCOPY AND TREATMENT: CPT | Mod: LT

## 2022-01-01 PROCEDURE — 36415 COLL VENOUS BLD VENIPUNCTURE: CPT

## 2022-01-01 PROCEDURE — 99497 ADVNCD CARE PLAN 30 MIN: CPT | Mod: 25

## 2022-01-01 PROCEDURE — 84100 ASSAY OF PHOSPHORUS: CPT

## 2022-01-01 PROCEDURE — 97110 THERAPEUTIC EXERCISES: CPT

## 2022-01-01 PROCEDURE — 84132 ASSAY OF SERUM POTASSIUM: CPT

## 2022-01-01 PROCEDURE — 99222 1ST HOSP IP/OBS MODERATE 55: CPT | Mod: FS

## 2022-01-01 PROCEDURE — 71250 CT THORAX DX C-: CPT

## 2022-01-01 PROCEDURE — 83880 ASSAY OF NATRIURETIC PEPTIDE: CPT

## 2022-01-01 PROCEDURE — 96374 THER/PROPH/DIAG INJ IV PUSH: CPT

## 2022-01-01 PROCEDURE — 86901 BLOOD TYPING SEROLOGIC RH(D): CPT

## 2022-01-01 PROCEDURE — 99233 SBSQ HOSP IP/OBS HIGH 50: CPT

## 2022-01-01 PROCEDURE — C1769: CPT

## 2022-01-01 PROCEDURE — 82947 ASSAY GLUCOSE BLOOD QUANT: CPT

## 2022-01-01 PROCEDURE — 85730 THROMBOPLASTIN TIME PARTIAL: CPT

## 2022-01-01 PROCEDURE — 87040 BLOOD CULTURE FOR BACTERIA: CPT

## 2022-01-01 PROCEDURE — 92610 EVALUATE SWALLOWING FUNCTION: CPT

## 2022-01-01 PROCEDURE — C2625: CPT

## 2022-01-01 PROCEDURE — 74177 CT ABD & PELVIS W/CONTRAST: CPT | Mod: MA

## 2022-01-01 PROCEDURE — 97161 PT EVAL LOW COMPLEX 20 MIN: CPT

## 2022-01-01 PROCEDURE — 99223 1ST HOSP IP/OBS HIGH 75: CPT

## 2022-01-01 PROCEDURE — 84443 ASSAY THYROID STIM HORMONE: CPT

## 2022-01-01 PROCEDURE — 71045 X-RAY EXAM CHEST 1 VIEW: CPT

## 2022-01-01 PROCEDURE — 80053 COMPREHEN METABOLIC PANEL: CPT

## 2022-01-01 PROCEDURE — 86900 BLOOD TYPING SEROLOGIC ABO: CPT

## 2022-01-01 PROCEDURE — 85018 HEMOGLOBIN: CPT

## 2022-01-01 PROCEDURE — 85025 COMPLETE CBC W/AUTO DIFF WBC: CPT

## 2022-01-01 PROCEDURE — 93306 TTE W/DOPPLER COMPLETE: CPT | Mod: 26

## 2022-01-01 PROCEDURE — 99231 SBSQ HOSP IP/OBS SF/LOW 25: CPT

## 2022-01-01 PROCEDURE — C1758: CPT

## 2022-01-01 PROCEDURE — 81001 URINALYSIS AUTO W/SCOPE: CPT

## 2022-01-01 PROCEDURE — 87077 CULTURE AEROBIC IDENTIFY: CPT

## 2022-01-01 PROCEDURE — 87086 URINE CULTURE/COLONY COUNT: CPT

## 2022-01-01 PROCEDURE — 93005 ELECTROCARDIOGRAM TRACING: CPT

## 2022-01-01 PROCEDURE — 82803 BLOOD GASES ANY COMBINATION: CPT

## 2022-01-01 PROCEDURE — 82962 GLUCOSE BLOOD TEST: CPT

## 2022-01-01 PROCEDURE — 84300 ASSAY OF URINE SODIUM: CPT

## 2022-01-01 PROCEDURE — 82533 TOTAL CORTISOL: CPT

## 2022-01-01 PROCEDURE — 85027 COMPLETE CBC AUTOMATED: CPT

## 2022-01-01 PROCEDURE — 96375 TX/PRO/DX INJ NEW DRUG ADDON: CPT

## 2022-01-01 PROCEDURE — 83605 ASSAY OF LACTIC ACID: CPT

## 2022-01-01 PROCEDURE — 71250 CT THORAX DX C-: CPT | Mod: 26

## 2022-01-01 PROCEDURE — 86850 RBC ANTIBODY SCREEN: CPT

## 2022-01-01 PROCEDURE — 85610 PROTHROMBIN TIME: CPT

## 2022-01-01 PROCEDURE — 99222 1ST HOSP IP/OBS MODERATE 55: CPT | Mod: 25,57

## 2022-01-01 PROCEDURE — 84295 ASSAY OF SERUM SODIUM: CPT

## 2022-01-01 PROCEDURE — 82435 ASSAY OF BLOOD CHLORIDE: CPT

## 2022-01-01 PROCEDURE — 82330 ASSAY OF CALCIUM: CPT

## 2022-01-01 PROCEDURE — 97530 THERAPEUTIC ACTIVITIES: CPT

## 2022-01-01 PROCEDURE — 87186 SC STD MICRODIL/AGAR DIL: CPT

## 2022-01-01 PROCEDURE — 85014 HEMATOCRIT: CPT

## 2022-01-01 PROCEDURE — 94640 AIRWAY INHALATION TREATMENT: CPT

## 2022-01-01 PROCEDURE — 83690 ASSAY OF LIPASE: CPT

## 2022-01-01 PROCEDURE — 83935 ASSAY OF URINE OSMOLALITY: CPT

## 2022-01-01 DEVICE — GUIDEWIRE SENSOR DUAL-FLEX NITINOL STRAIGHT .035" X 150CM: Type: IMPLANTABLE DEVICE | Site: LEFT | Status: FUNCTIONAL

## 2022-01-01 DEVICE — STENT URET 7FR 24CM: Type: IMPLANTABLE DEVICE | Site: LEFT | Status: FUNCTIONAL

## 2022-01-01 DEVICE — URETERAL CATH AXXCESS OPEN END 6FR 70CM: Type: IMPLANTABLE DEVICE | Site: LEFT | Status: FUNCTIONAL

## 2022-01-01 RX ORDER — POTASSIUM CHLORIDE 20 MEQ
40 PACKET (EA) ORAL ONCE
Refills: 0 | Status: COMPLETED | OUTPATIENT
Start: 2022-01-01 | End: 2022-01-01

## 2022-01-01 RX ORDER — SODIUM CHLORIDE 9 MG/ML
500 INJECTION, SOLUTION INTRAVENOUS ONCE
Refills: 0 | Status: COMPLETED | OUTPATIENT
Start: 2022-01-01 | End: 2022-01-01

## 2022-01-01 RX ORDER — MAGNESIUM SULFATE 500 MG/ML
2 VIAL (ML) INJECTION ONCE
Refills: 0 | Status: COMPLETED | OUTPATIENT
Start: 2022-01-01 | End: 2022-01-01

## 2022-01-01 RX ORDER — FUROSEMIDE 40 MG
40 TABLET ORAL ONCE
Refills: 0 | Status: COMPLETED | OUTPATIENT
Start: 2022-01-01 | End: 2022-01-01

## 2022-01-01 RX ORDER — ACETAMINOPHEN 500 MG
1000 TABLET ORAL ONCE
Refills: 0 | Status: DISCONTINUED | OUTPATIENT
Start: 2022-01-01 | End: 2022-01-01

## 2022-01-01 RX ORDER — POTASSIUM CHLORIDE 20 MEQ
20 PACKET (EA) ORAL
Refills: 0 | Status: COMPLETED | OUTPATIENT
Start: 2022-01-01 | End: 2022-01-01

## 2022-01-01 RX ORDER — SENNA PLUS 8.6 MG/1
2 TABLET ORAL AT BEDTIME
Refills: 0 | Status: DISCONTINUED | OUTPATIENT
Start: 2022-01-01 | End: 2022-01-01

## 2022-01-01 RX ORDER — SODIUM CHLORIDE 9 MG/ML
1000 INJECTION, SOLUTION INTRAVENOUS
Refills: 0 | Status: DISCONTINUED | OUTPATIENT
Start: 2022-01-01 | End: 2022-01-01

## 2022-01-01 RX ORDER — TRAMADOL HYDROCHLORIDE 50 MG/1
50 TABLET, COATED ORAL
Qty: 90 | Refills: 0 | Status: ACTIVE | COMMUNITY
Start: 2022-01-01

## 2022-01-01 RX ORDER — TOLVAPTAN 15 MG/1
15 TABLET ORAL ONCE
Refills: 0 | Status: COMPLETED | OUTPATIENT
Start: 2022-01-01 | End: 2022-01-01

## 2022-01-01 RX ORDER — PIPERACILLIN AND TAZOBACTAM 4; .5 G/20ML; G/20ML
3.38 INJECTION, POWDER, LYOPHILIZED, FOR SOLUTION INTRAVENOUS EVERY 8 HOURS
Refills: 0 | Status: DISCONTINUED | OUTPATIENT
Start: 2022-01-01 | End: 2022-01-01

## 2022-01-01 RX ORDER — FAMOTIDINE 10 MG/ML
20 INJECTION INTRAVENOUS
Refills: 0 | Status: DISCONTINUED | OUTPATIENT
Start: 2022-01-01 | End: 2022-01-01

## 2022-01-01 RX ORDER — DEXTROSE 50 % IN WATER 50 %
25 SYRINGE (ML) INTRAVENOUS ONCE
Refills: 0 | Status: DISCONTINUED | OUTPATIENT
Start: 2022-01-01 | End: 2022-01-01

## 2022-01-01 RX ORDER — SODIUM BICARBONATE 1 MEQ/ML
0.23 SYRINGE (ML) INTRAVENOUS
Qty: 75 | Refills: 0 | Status: DISCONTINUED | OUTPATIENT
Start: 2022-01-01 | End: 2022-01-01

## 2022-01-01 RX ORDER — SUCRALFATE 1 G
1 TABLET ORAL
Refills: 0 | Status: DISCONTINUED | OUTPATIENT
Start: 2022-01-01 | End: 2022-01-01

## 2022-01-01 RX ORDER — CEFTRIAXONE 500 MG/1
1000 INJECTION, POWDER, FOR SOLUTION INTRAMUSCULAR; INTRAVENOUS ONCE
Refills: 0 | Status: COMPLETED | OUTPATIENT
Start: 2022-01-01 | End: 2022-01-01

## 2022-01-01 RX ORDER — SODIUM BICARBONATE 1 MEQ/ML
650 SYRINGE (ML) INTRAVENOUS THREE TIMES A DAY
Refills: 0 | Status: DISCONTINUED | OUTPATIENT
Start: 2022-01-01 | End: 2022-01-01

## 2022-01-01 RX ORDER — ONDANSETRON 8 MG/1
4 TABLET, FILM COATED ORAL EVERY 6 HOURS
Refills: 0 | Status: DISCONTINUED | OUTPATIENT
Start: 2022-01-01 | End: 2022-01-01

## 2022-01-01 RX ORDER — PIPERACILLIN AND TAZOBACTAM 4; .5 G/20ML; G/20ML
3.38 INJECTION, POWDER, LYOPHILIZED, FOR SOLUTION INTRAVENOUS EVERY 8 HOURS
Refills: 0 | Status: COMPLETED | OUTPATIENT
Start: 2022-01-01 | End: 2022-01-01

## 2022-01-01 RX ORDER — SODIUM CHLORIDE 9 MG/ML
1000 INJECTION INTRAMUSCULAR; INTRAVENOUS; SUBCUTANEOUS ONCE
Refills: 0 | Status: COMPLETED | OUTPATIENT
Start: 2022-01-01 | End: 2022-01-01

## 2022-01-01 RX ORDER — POTASSIUM CHLORIDE 20 MEQ
40 PACKET (EA) ORAL EVERY 4 HOURS
Refills: 0 | Status: COMPLETED | OUTPATIENT
Start: 2022-01-01 | End: 2022-01-01

## 2022-01-01 RX ORDER — ONDANSETRON 8 MG/1
4 TABLET, FILM COATED ORAL ONCE
Refills: 0 | Status: COMPLETED | OUTPATIENT
Start: 2022-01-01 | End: 2022-01-01

## 2022-01-01 RX ORDER — BENZOCAINE AND MENTHOL 5; 1 G/100ML; G/100ML
1 LIQUID ORAL THREE TIMES A DAY
Refills: 0 | Status: DISCONTINUED | OUTPATIENT
Start: 2022-01-01 | End: 2022-01-01

## 2022-01-01 RX ORDER — POTASSIUM CHLORIDE 20 MEQ
20 PACKET (EA) ORAL ONCE
Refills: 0 | Status: COMPLETED | OUTPATIENT
Start: 2022-01-01 | End: 2022-01-01

## 2022-01-01 RX ORDER — SODIUM CHLORIDE 9 MG/ML
600 INJECTION INTRAMUSCULAR; INTRAVENOUS; SUBCUTANEOUS ONCE
Refills: 0 | Status: COMPLETED | OUTPATIENT
Start: 2022-01-01 | End: 2022-01-01

## 2022-01-01 RX ORDER — DEXTROSE 50 % IN WATER 50 %
12.5 SYRINGE (ML) INTRAVENOUS ONCE
Refills: 0 | Status: COMPLETED | OUTPATIENT
Start: 2022-01-01 | End: 2022-01-01

## 2022-01-01 RX ORDER — SODIUM CHLORIDE 9 MG/ML
250 INJECTION INTRAMUSCULAR; INTRAVENOUS; SUBCUTANEOUS ONCE
Refills: 0 | Status: DISCONTINUED | OUTPATIENT
Start: 2022-01-01 | End: 2022-01-01

## 2022-01-01 RX ORDER — IPRATROPIUM/ALBUTEROL SULFATE 18-103MCG
3 AEROSOL WITH ADAPTER (GRAM) INHALATION ONCE
Refills: 0 | Status: COMPLETED | OUTPATIENT
Start: 2022-01-01 | End: 2022-01-01

## 2022-01-01 RX ORDER — NIFEDIPINE 60 MG/1
60 TABLET, EXTENDED RELEASE ORAL
Qty: 90 | Refills: 0 | Status: ACTIVE | COMMUNITY
Start: 2022-01-01

## 2022-01-01 RX ORDER — POLYETHYLENE GLYCOL 3350 17 G/17G
17 POWDER, FOR SOLUTION ORAL DAILY
Refills: 0 | Status: DISCONTINUED | OUTPATIENT
Start: 2022-01-01 | End: 2022-01-01

## 2022-01-01 RX ORDER — SODIUM CHLORIDE 9 MG/ML
1000 INJECTION INTRAMUSCULAR; INTRAVENOUS; SUBCUTANEOUS
Refills: 0 | Status: DISCONTINUED | OUTPATIENT
Start: 2022-01-01 | End: 2022-01-01

## 2022-01-01 RX ORDER — TAMSULOSIN HYDROCHLORIDE 0.4 MG/1
0.4 CAPSULE ORAL DAILY
Refills: 0 | Status: DISCONTINUED | OUTPATIENT
Start: 2022-01-01 | End: 2022-01-01

## 2022-01-01 RX ORDER — DEXTROSE 50 % IN WATER 50 %
12.5 SYRINGE (ML) INTRAVENOUS ONCE
Refills: 0 | Status: DISCONTINUED | OUTPATIENT
Start: 2022-01-01 | End: 2022-01-01

## 2022-01-01 RX ORDER — HYDRALAZINE HYDROCHLORIDE 100 MG/1
100 TABLET ORAL
Qty: 270 | Refills: 0 | Status: ACTIVE | COMMUNITY
Start: 2022-01-01

## 2022-01-01 RX ORDER — METOCLOPRAMIDE HCL 10 MG
5 TABLET ORAL EVERY 6 HOURS
Refills: 0 | Status: DISCONTINUED | OUTPATIENT
Start: 2022-01-01 | End: 2022-01-01

## 2022-01-01 RX ORDER — DEXTROSE 50 % IN WATER 50 %
25 SYRINGE (ML) INTRAVENOUS ONCE
Refills: 0 | Status: COMPLETED | OUTPATIENT
Start: 2022-01-01 | End: 2022-01-01

## 2022-01-01 RX ORDER — POTASSIUM CHLORIDE 20 MEQ
10 PACKET (EA) ORAL
Refills: 0 | Status: COMPLETED | OUTPATIENT
Start: 2022-01-01 | End: 2022-01-01

## 2022-01-01 RX ORDER — PIPERACILLIN AND TAZOBACTAM 4; .5 G/20ML; G/20ML
3.38 INJECTION, POWDER, LYOPHILIZED, FOR SOLUTION INTRAVENOUS ONCE
Refills: 0 | Status: COMPLETED | OUTPATIENT
Start: 2022-01-01 | End: 2022-01-01

## 2022-01-01 RX ORDER — ACETAMINOPHEN 500 MG
650 TABLET ORAL EVERY 6 HOURS
Refills: 0 | Status: DISCONTINUED | OUTPATIENT
Start: 2022-01-01 | End: 2022-01-01

## 2022-01-01 RX ORDER — NYSTATIN CREAM 100000 [USP'U]/G
1 CREAM TOPICAL
Refills: 0 | Status: DISCONTINUED | OUTPATIENT
Start: 2022-01-01 | End: 2022-01-01

## 2022-01-01 RX ORDER — METOPROLOL TARTRATE 50 MG
12.5 TABLET ORAL
Refills: 0 | Status: DISCONTINUED | OUTPATIENT
Start: 2022-01-01 | End: 2022-01-01

## 2022-01-01 RX ORDER — DEXTROSE 50 % IN WATER 50 %
15 SYRINGE (ML) INTRAVENOUS ONCE
Refills: 0 | Status: DISCONTINUED | OUTPATIENT
Start: 2022-01-01 | End: 2022-01-01

## 2022-01-01 RX ORDER — DEXTROSE 50 % IN WATER 50 %
15 SYRINGE (ML) INTRAVENOUS ONCE
Refills: 0 | Status: COMPLETED | OUTPATIENT
Start: 2022-01-01 | End: 2022-01-01

## 2022-01-01 RX ORDER — POTASSIUM PHOSPHATE, MONOBASIC POTASSIUM PHOSPHATE, DIBASIC 236; 224 MG/ML; MG/ML
30 INJECTION, SOLUTION INTRAVENOUS ONCE
Refills: 0 | Status: COMPLETED | OUTPATIENT
Start: 2022-01-01 | End: 2022-01-01

## 2022-01-01 RX ORDER — HEPARIN SODIUM 5000 [USP'U]/ML
5000 INJECTION INTRAVENOUS; SUBCUTANEOUS EVERY 8 HOURS
Refills: 0 | Status: DISCONTINUED | OUTPATIENT
Start: 2022-01-01 | End: 2022-01-01

## 2022-01-01 RX ORDER — GLUCAGON INJECTION, SOLUTION 0.5 MG/.1ML
1 INJECTION, SOLUTION SUBCUTANEOUS ONCE
Refills: 0 | Status: DISCONTINUED | OUTPATIENT
Start: 2022-01-01 | End: 2022-01-01

## 2022-01-01 RX ADMIN — Medication 1 GRAM(S): at 17:48

## 2022-01-01 RX ADMIN — Medication 5 MILLIGRAM(S): at 05:28

## 2022-01-01 RX ADMIN — Medication 1 GRAM(S): at 14:08

## 2022-01-01 RX ADMIN — TAMSULOSIN HYDROCHLORIDE 0.4 MILLIGRAM(S): 0.4 CAPSULE ORAL at 13:11

## 2022-01-01 RX ADMIN — TAMSULOSIN HYDROCHLORIDE 0.4 MILLIGRAM(S): 0.4 CAPSULE ORAL at 11:10

## 2022-01-01 RX ADMIN — Medication 650 MILLIGRAM(S): at 21:19

## 2022-01-01 RX ADMIN — SENNA PLUS 2 TABLET(S): 8.6 TABLET ORAL at 23:03

## 2022-01-01 RX ADMIN — Medication 650 MILLIGRAM(S): at 13:30

## 2022-01-01 RX ADMIN — Medication 40 MILLIGRAM(S): at 17:30

## 2022-01-01 RX ADMIN — Medication 1 GRAM(S): at 05:01

## 2022-01-01 RX ADMIN — Medication 650 MILLIGRAM(S): at 21:20

## 2022-01-01 RX ADMIN — HEPARIN SODIUM 5000 UNIT(S): 5000 INJECTION INTRAVENOUS; SUBCUTANEOUS at 05:01

## 2022-01-01 RX ADMIN — Medication 600 MILLIGRAM(S): at 18:38

## 2022-01-01 RX ADMIN — ONDANSETRON 4 MILLIGRAM(S): 8 TABLET, FILM COATED ORAL at 04:59

## 2022-01-01 RX ADMIN — Medication 1 GRAM(S): at 00:29

## 2022-01-01 RX ADMIN — Medication 5 MILLIGRAM(S): at 15:38

## 2022-01-01 RX ADMIN — POLYETHYLENE GLYCOL 3350 17 GRAM(S): 17 POWDER, FOR SOLUTION ORAL at 11:06

## 2022-01-01 RX ADMIN — Medication 20 MILLIEQUIVALENT(S): at 09:17

## 2022-01-01 RX ADMIN — Medication 1 GRAM(S): at 13:30

## 2022-01-01 RX ADMIN — HEPARIN SODIUM 5000 UNIT(S): 5000 INJECTION INTRAVENOUS; SUBCUTANEOUS at 22:22

## 2022-01-01 RX ADMIN — Medication 5 MILLIGRAM(S): at 21:51

## 2022-01-01 RX ADMIN — Medication 5 MILLIGRAM(S): at 21:37

## 2022-01-01 RX ADMIN — Medication 650 MILLIGRAM(S): at 23:03

## 2022-01-01 RX ADMIN — PIPERACILLIN AND TAZOBACTAM 25 GRAM(S): 4; .5 INJECTION, POWDER, LYOPHILIZED, FOR SOLUTION INTRAVENOUS at 02:14

## 2022-01-01 RX ADMIN — Medication 5 MILLIGRAM(S): at 05:02

## 2022-01-01 RX ADMIN — HEPARIN SODIUM 5000 UNIT(S): 5000 INJECTION INTRAVENOUS; SUBCUTANEOUS at 23:02

## 2022-01-01 RX ADMIN — CEFTRIAXONE 100 MILLIGRAM(S): 500 INJECTION, POWDER, FOR SOLUTION INTRAMUSCULAR; INTRAVENOUS at 16:15

## 2022-01-01 RX ADMIN — Medication 1 GRAM(S): at 18:07

## 2022-01-01 RX ADMIN — TAMSULOSIN HYDROCHLORIDE 0.4 MILLIGRAM(S): 0.4 CAPSULE ORAL at 11:14

## 2022-01-01 RX ADMIN — PIPERACILLIN AND TAZOBACTAM 25 GRAM(S): 4; .5 INJECTION, POWDER, LYOPHILIZED, FOR SOLUTION INTRAVENOUS at 16:36

## 2022-01-01 RX ADMIN — SODIUM CHLORIDE 500 MILLILITER(S): 9 INJECTION INTRAMUSCULAR; INTRAVENOUS; SUBCUTANEOUS at 16:58

## 2022-01-01 RX ADMIN — ONDANSETRON 4 MILLIGRAM(S): 8 TABLET, FILM COATED ORAL at 12:27

## 2022-01-01 RX ADMIN — HEPARIN SODIUM 5000 UNIT(S): 5000 INJECTION INTRAVENOUS; SUBCUTANEOUS at 04:57

## 2022-01-01 RX ADMIN — TAMSULOSIN HYDROCHLORIDE 0.4 MILLIGRAM(S): 0.4 CAPSULE ORAL at 12:02

## 2022-01-01 RX ADMIN — SODIUM CHLORIDE 100 MILLILITER(S): 9 INJECTION, SOLUTION INTRAVENOUS at 12:29

## 2022-01-01 RX ADMIN — PIPERACILLIN AND TAZOBACTAM 25 GRAM(S): 4; .5 INJECTION, POWDER, LYOPHILIZED, FOR SOLUTION INTRAVENOUS at 11:05

## 2022-01-01 RX ADMIN — TAMSULOSIN HYDROCHLORIDE 0.4 MILLIGRAM(S): 0.4 CAPSULE ORAL at 12:43

## 2022-01-01 RX ADMIN — Medication 1 GRAM(S): at 18:47

## 2022-01-01 RX ADMIN — Medication 1 GRAM(S): at 05:14

## 2022-01-01 RX ADMIN — Medication 1 GRAM(S): at 18:30

## 2022-01-01 RX ADMIN — SENNA PLUS 2 TABLET(S): 8.6 TABLET ORAL at 21:24

## 2022-01-01 RX ADMIN — Medication 40 MILLIEQUIVALENT(S): at 18:45

## 2022-01-01 RX ADMIN — FAMOTIDINE 20 MILLIGRAM(S): 10 INJECTION INTRAVENOUS at 05:34

## 2022-01-01 RX ADMIN — FAMOTIDINE 20 MILLIGRAM(S): 10 INJECTION INTRAVENOUS at 17:28

## 2022-01-01 RX ADMIN — PIPERACILLIN AND TAZOBACTAM 25 GRAM(S): 4; .5 INJECTION, POWDER, LYOPHILIZED, FOR SOLUTION INTRAVENOUS at 02:42

## 2022-01-01 RX ADMIN — TAMSULOSIN HYDROCHLORIDE 0.4 MILLIGRAM(S): 0.4 CAPSULE ORAL at 13:29

## 2022-01-01 RX ADMIN — Medication 1 GRAM(S): at 00:04

## 2022-01-01 RX ADMIN — Medication 1 GRAM(S): at 04:57

## 2022-01-01 RX ADMIN — FAMOTIDINE 20 MILLIGRAM(S): 10 INJECTION INTRAVENOUS at 17:19

## 2022-01-01 RX ADMIN — Medication 650 MILLIGRAM(S): at 14:57

## 2022-01-01 RX ADMIN — Medication 1 GRAM(S): at 05:28

## 2022-01-01 RX ADMIN — NYSTATIN CREAM 1 APPLICATION(S): 100000 CREAM TOPICAL at 17:29

## 2022-01-01 RX ADMIN — PIPERACILLIN AND TAZOBACTAM 25 GRAM(S): 4; .5 INJECTION, POWDER, LYOPHILIZED, FOR SOLUTION INTRAVENOUS at 17:41

## 2022-01-01 RX ADMIN — Medication 1 GRAM(S): at 05:49

## 2022-01-01 RX ADMIN — Medication 5 MILLIGRAM(S): at 21:19

## 2022-01-01 RX ADMIN — HEPARIN SODIUM 5000 UNIT(S): 5000 INJECTION INTRAVENOUS; SUBCUTANEOUS at 13:10

## 2022-01-01 RX ADMIN — Medication 5 MILLIGRAM(S): at 10:13

## 2022-01-01 RX ADMIN — Medication 650 MILLIGRAM(S): at 15:51

## 2022-01-01 RX ADMIN — Medication 5 MILLIGRAM(S): at 14:38

## 2022-01-01 RX ADMIN — HEPARIN SODIUM 5000 UNIT(S): 5000 INJECTION INTRAVENOUS; SUBCUTANEOUS at 05:38

## 2022-01-01 RX ADMIN — Medication 5 MILLIGRAM(S): at 05:50

## 2022-01-01 RX ADMIN — Medication 5 MILLIGRAM(S): at 21:39

## 2022-01-01 RX ADMIN — PIPERACILLIN AND TAZOBACTAM 25 GRAM(S): 4; .5 INJECTION, POWDER, LYOPHILIZED, FOR SOLUTION INTRAVENOUS at 02:16

## 2022-01-01 RX ADMIN — PIPERACILLIN AND TAZOBACTAM 25 GRAM(S): 4; .5 INJECTION, POWDER, LYOPHILIZED, FOR SOLUTION INTRAVENOUS at 02:51

## 2022-01-01 RX ADMIN — PIPERACILLIN AND TAZOBACTAM 25 GRAM(S): 4; .5 INJECTION, POWDER, LYOPHILIZED, FOR SOLUTION INTRAVENOUS at 01:27

## 2022-01-01 RX ADMIN — FAMOTIDINE 20 MILLIGRAM(S): 10 INJECTION INTRAVENOUS at 11:58

## 2022-01-01 RX ADMIN — NYSTATIN CREAM 1 APPLICATION(S): 100000 CREAM TOPICAL at 17:11

## 2022-01-01 RX ADMIN — FAMOTIDINE 20 MILLIGRAM(S): 10 INJECTION INTRAVENOUS at 05:00

## 2022-01-01 RX ADMIN — FAMOTIDINE 20 MILLIGRAM(S): 10 INJECTION INTRAVENOUS at 19:43

## 2022-01-01 RX ADMIN — Medication 650 MILLIGRAM(S): at 21:50

## 2022-01-01 RX ADMIN — PIPERACILLIN AND TAZOBACTAM 25 GRAM(S): 4; .5 INJECTION, POWDER, LYOPHILIZED, FOR SOLUTION INTRAVENOUS at 04:16

## 2022-01-01 RX ADMIN — PIPERACILLIN AND TAZOBACTAM 25 GRAM(S): 4; .5 INJECTION, POWDER, LYOPHILIZED, FOR SOLUTION INTRAVENOUS at 01:19

## 2022-01-01 RX ADMIN — HEPARIN SODIUM 5000 UNIT(S): 5000 INJECTION INTRAVENOUS; SUBCUTANEOUS at 06:42

## 2022-01-01 RX ADMIN — Medication 5 MILLIGRAM(S): at 10:48

## 2022-01-01 RX ADMIN — POLYETHYLENE GLYCOL 3350 17 GRAM(S): 17 POWDER, FOR SOLUTION ORAL at 14:36

## 2022-01-01 RX ADMIN — HEPARIN SODIUM 5000 UNIT(S): 5000 INJECTION INTRAVENOUS; SUBCUTANEOUS at 05:14

## 2022-01-01 RX ADMIN — FAMOTIDINE 20 MILLIGRAM(S): 10 INJECTION INTRAVENOUS at 06:21

## 2022-01-01 RX ADMIN — Medication 40 MILLIEQUIVALENT(S): at 23:25

## 2022-01-01 RX ADMIN — HEPARIN SODIUM 5000 UNIT(S): 5000 INJECTION INTRAVENOUS; SUBCUTANEOUS at 12:49

## 2022-01-01 RX ADMIN — Medication 5 MILLIGRAM(S): at 17:54

## 2022-01-01 RX ADMIN — HEPARIN SODIUM 5000 UNIT(S): 5000 INJECTION INTRAVENOUS; SUBCUTANEOUS at 13:29

## 2022-01-01 RX ADMIN — HEPARIN SODIUM 5000 UNIT(S): 5000 INJECTION INTRAVENOUS; SUBCUTANEOUS at 23:25

## 2022-01-01 RX ADMIN — Medication 1 GRAM(S): at 17:31

## 2022-01-01 RX ADMIN — Medication 650 MILLIGRAM(S): at 20:35

## 2022-01-01 RX ADMIN — HEPARIN SODIUM 5000 UNIT(S): 5000 INJECTION INTRAVENOUS; SUBCUTANEOUS at 15:28

## 2022-01-01 RX ADMIN — SODIUM CHLORIDE 100 MILLILITER(S): 9 INJECTION, SOLUTION INTRAVENOUS at 02:52

## 2022-01-01 RX ADMIN — Medication 5 MILLIGRAM(S): at 23:06

## 2022-01-01 RX ADMIN — Medication 1 GRAM(S): at 05:15

## 2022-01-01 RX ADMIN — Medication 20 MILLIEQUIVALENT(S): at 12:49

## 2022-01-01 RX ADMIN — HEPARIN SODIUM 5000 UNIT(S): 5000 INJECTION INTRAVENOUS; SUBCUTANEOUS at 13:14

## 2022-01-01 RX ADMIN — HEPARIN SODIUM 5000 UNIT(S): 5000 INJECTION INTRAVENOUS; SUBCUTANEOUS at 21:19

## 2022-01-01 RX ADMIN — FAMOTIDINE 20 MILLIGRAM(S): 10 INJECTION INTRAVENOUS at 06:03

## 2022-01-01 RX ADMIN — HEPARIN SODIUM 5000 UNIT(S): 5000 INJECTION INTRAVENOUS; SUBCUTANEOUS at 16:56

## 2022-01-01 RX ADMIN — HEPARIN SODIUM 5000 UNIT(S): 5000 INJECTION INTRAVENOUS; SUBCUTANEOUS at 21:53

## 2022-01-01 RX ADMIN — Medication 5 MILLIGRAM(S): at 21:21

## 2022-01-01 RX ADMIN — POTASSIUM PHOSPHATE, MONOBASIC POTASSIUM PHOSPHATE, DIBASIC 83.33 MILLIMOLE(S): 236; 224 INJECTION, SOLUTION INTRAVENOUS at 15:29

## 2022-01-01 RX ADMIN — HEPARIN SODIUM 5000 UNIT(S): 5000 INJECTION INTRAVENOUS; SUBCUTANEOUS at 16:33

## 2022-01-01 RX ADMIN — Medication 12.5 MILLIGRAM(S): at 05:02

## 2022-01-01 RX ADMIN — SODIUM CHLORIDE 100 MILLILITER(S): 9 INJECTION, SOLUTION INTRAVENOUS at 18:38

## 2022-01-01 RX ADMIN — Medication 5 MILLIGRAM(S): at 05:00

## 2022-01-01 RX ADMIN — Medication 5 MILLIGRAM(S): at 06:20

## 2022-01-01 RX ADMIN — HEPARIN SODIUM 5000 UNIT(S): 5000 INJECTION INTRAVENOUS; SUBCUTANEOUS at 14:37

## 2022-01-01 RX ADMIN — HEPARIN SODIUM 5000 UNIT(S): 5000 INJECTION INTRAVENOUS; SUBCUTANEOUS at 14:08

## 2022-01-01 RX ADMIN — Medication 1 GRAM(S): at 05:00

## 2022-01-01 RX ADMIN — Medication 5 MILLIGRAM(S): at 23:27

## 2022-01-01 RX ADMIN — Medication 650 MILLIGRAM(S): at 12:43

## 2022-01-01 RX ADMIN — NYSTATIN CREAM 1 APPLICATION(S): 100000 CREAM TOPICAL at 05:14

## 2022-01-01 RX ADMIN — PIPERACILLIN AND TAZOBACTAM 25 GRAM(S): 4; .5 INJECTION, POWDER, LYOPHILIZED, FOR SOLUTION INTRAVENOUS at 17:04

## 2022-01-01 RX ADMIN — HEPARIN SODIUM 5000 UNIT(S): 5000 INJECTION INTRAVENOUS; SUBCUTANEOUS at 05:10

## 2022-01-01 RX ADMIN — SODIUM CHLORIDE 75 MILLILITER(S): 9 INJECTION, SOLUTION INTRAVENOUS at 18:06

## 2022-01-01 RX ADMIN — Medication 1 GRAM(S): at 05:02

## 2022-01-01 RX ADMIN — HEPARIN SODIUM 5000 UNIT(S): 5000 INJECTION INTRAVENOUS; SUBCUTANEOUS at 05:28

## 2022-01-01 RX ADMIN — Medication 650 MILLIGRAM(S): at 19:37

## 2022-01-01 RX ADMIN — FAMOTIDINE 20 MILLIGRAM(S): 10 INJECTION INTRAVENOUS at 17:49

## 2022-01-01 RX ADMIN — Medication 1 GRAM(S): at 05:48

## 2022-01-01 RX ADMIN — SODIUM CHLORIDE 50 MILLILITER(S): 9 INJECTION INTRAMUSCULAR; INTRAVENOUS; SUBCUTANEOUS at 21:55

## 2022-01-01 RX ADMIN — Medication 5 MILLIGRAM(S): at 11:41

## 2022-01-01 RX ADMIN — FAMOTIDINE 20 MILLIGRAM(S): 10 INJECTION INTRAVENOUS at 05:57

## 2022-01-01 RX ADMIN — ONDANSETRON 4 MILLIGRAM(S): 8 TABLET, FILM COATED ORAL at 17:31

## 2022-01-01 RX ADMIN — Medication 1 GRAM(S): at 12:13

## 2022-01-01 RX ADMIN — PIPERACILLIN AND TAZOBACTAM 25 GRAM(S): 4; .5 INJECTION, POWDER, LYOPHILIZED, FOR SOLUTION INTRAVENOUS at 10:13

## 2022-01-01 RX ADMIN — ONDANSETRON 4 MILLIGRAM(S): 8 TABLET, FILM COATED ORAL at 11:00

## 2022-01-01 RX ADMIN — PIPERACILLIN AND TAZOBACTAM 25 GRAM(S): 4; .5 INJECTION, POWDER, LYOPHILIZED, FOR SOLUTION INTRAVENOUS at 10:47

## 2022-01-01 RX ADMIN — TAMSULOSIN HYDROCHLORIDE 0.4 MILLIGRAM(S): 0.4 CAPSULE ORAL at 12:46

## 2022-01-01 RX ADMIN — Medication 5 MILLIGRAM(S): at 10:09

## 2022-01-01 RX ADMIN — Medication 1 GRAM(S): at 23:25

## 2022-01-01 RX ADMIN — Medication 5 MILLIGRAM(S): at 00:45

## 2022-01-01 RX ADMIN — ONDANSETRON 4 MILLIGRAM(S): 8 TABLET, FILM COATED ORAL at 13:00

## 2022-01-01 RX ADMIN — Medication 1 GRAM(S): at 18:46

## 2022-01-01 RX ADMIN — HEPARIN SODIUM 5000 UNIT(S): 5000 INJECTION INTRAVENOUS; SUBCUTANEOUS at 05:46

## 2022-01-01 RX ADMIN — Medication 1 GRAM(S): at 00:52

## 2022-01-01 RX ADMIN — Medication 5 MILLIGRAM(S): at 05:14

## 2022-01-01 RX ADMIN — Medication 5 MILLIGRAM(S): at 06:05

## 2022-01-01 RX ADMIN — TAMSULOSIN HYDROCHLORIDE 0.4 MILLIGRAM(S): 0.4 CAPSULE ORAL at 12:13

## 2022-01-01 RX ADMIN — Medication 1 GRAM(S): at 14:59

## 2022-01-01 RX ADMIN — FAMOTIDINE 20 MILLIGRAM(S): 10 INJECTION INTRAVENOUS at 17:56

## 2022-01-01 RX ADMIN — Medication 5 MILLIGRAM(S): at 10:11

## 2022-01-01 RX ADMIN — TAMSULOSIN HYDROCHLORIDE 0.4 MILLIGRAM(S): 0.4 CAPSULE ORAL at 11:58

## 2022-01-01 RX ADMIN — SENNA PLUS 2 TABLET(S): 8.6 TABLET ORAL at 21:19

## 2022-01-01 RX ADMIN — Medication 1 GRAM(S): at 23:03

## 2022-01-01 RX ADMIN — HEPARIN SODIUM 5000 UNIT(S): 5000 INJECTION INTRAVENOUS; SUBCUTANEOUS at 05:15

## 2022-01-01 RX ADMIN — Medication 5 MILLIGRAM(S): at 10:51

## 2022-01-01 RX ADMIN — Medication 20 MILLIEQUIVALENT(S): at 10:01

## 2022-01-01 RX ADMIN — Medication 25 GRAM(S): at 12:29

## 2022-01-01 RX ADMIN — Medication 650 MILLIGRAM(S): at 21:05

## 2022-01-01 RX ADMIN — HEPARIN SODIUM 5000 UNIT(S): 5000 INJECTION INTRAVENOUS; SUBCUTANEOUS at 21:38

## 2022-01-01 RX ADMIN — Medication 5 MILLIGRAM(S): at 04:56

## 2022-01-01 RX ADMIN — Medication 1 GRAM(S): at 12:30

## 2022-01-01 RX ADMIN — Medication 250 MEQ/KG/HR: at 13:36

## 2022-01-01 RX ADMIN — FAMOTIDINE 20 MILLIGRAM(S): 10 INJECTION INTRAVENOUS at 05:14

## 2022-01-01 RX ADMIN — Medication 5 MILLIGRAM(S): at 21:24

## 2022-01-01 RX ADMIN — NYSTATIN CREAM 1 APPLICATION(S): 100000 CREAM TOPICAL at 17:52

## 2022-01-01 RX ADMIN — Medication 650 MILLIGRAM(S): at 05:11

## 2022-01-01 RX ADMIN — Medication 1 GRAM(S): at 11:05

## 2022-01-01 RX ADMIN — PIPERACILLIN AND TAZOBACTAM 25 GRAM(S): 4; .5 INJECTION, POWDER, LYOPHILIZED, FOR SOLUTION INTRAVENOUS at 10:52

## 2022-01-01 RX ADMIN — Medication 5 MILLIGRAM(S): at 16:55

## 2022-01-01 RX ADMIN — Medication 1 GRAM(S): at 13:42

## 2022-01-01 RX ADMIN — PIPERACILLIN AND TAZOBACTAM 25 GRAM(S): 4; .5 INJECTION, POWDER, LYOPHILIZED, FOR SOLUTION INTRAVENOUS at 17:05

## 2022-01-01 RX ADMIN — FAMOTIDINE 20 MILLIGRAM(S): 10 INJECTION INTRAVENOUS at 06:47

## 2022-01-01 RX ADMIN — Medication 1 GRAM(S): at 06:20

## 2022-01-01 RX ADMIN — Medication 1 GRAM(S): at 00:45

## 2022-01-01 RX ADMIN — SODIUM CHLORIDE 100 MILLILITER(S): 9 INJECTION, SOLUTION INTRAVENOUS at 10:09

## 2022-01-01 RX ADMIN — Medication 40 MILLIEQUIVALENT(S): at 15:28

## 2022-01-01 RX ADMIN — Medication 12.5 MILLIGRAM(S): at 18:47

## 2022-01-01 RX ADMIN — HEPARIN SODIUM 5000 UNIT(S): 5000 INJECTION INTRAVENOUS; SUBCUTANEOUS at 06:05

## 2022-01-01 RX ADMIN — FAMOTIDINE 20 MILLIGRAM(S): 10 INJECTION INTRAVENOUS at 18:30

## 2022-01-01 RX ADMIN — Medication 600 MILLIGRAM(S): at 12:27

## 2022-01-01 RX ADMIN — Medication 100 MILLIEQUIVALENT(S): at 05:05

## 2022-01-01 RX ADMIN — PIPERACILLIN AND TAZOBACTAM 25 GRAM(S): 4; .5 INJECTION, POWDER, LYOPHILIZED, FOR SOLUTION INTRAVENOUS at 17:48

## 2022-01-01 RX ADMIN — Medication 1 GRAM(S): at 23:44

## 2022-01-01 RX ADMIN — Medication 1 GRAM(S): at 23:35

## 2022-01-01 RX ADMIN — HEPARIN SODIUM 5000 UNIT(S): 5000 INJECTION INTRAVENOUS; SUBCUTANEOUS at 13:06

## 2022-01-01 RX ADMIN — Medication 650 MILLIGRAM(S): at 05:49

## 2022-01-01 RX ADMIN — HEPARIN SODIUM 5000 UNIT(S): 5000 INJECTION INTRAVENOUS; SUBCUTANEOUS at 05:50

## 2022-01-01 RX ADMIN — SODIUM CHLORIDE 1000 MILLILITER(S): 9 INJECTION, SOLUTION INTRAVENOUS at 18:06

## 2022-01-01 RX ADMIN — FAMOTIDINE 20 MILLIGRAM(S): 10 INJECTION INTRAVENOUS at 18:31

## 2022-01-01 RX ADMIN — Medication 650 MILLIGRAM(S): at 14:37

## 2022-01-01 RX ADMIN — TOLVAPTAN 15 MILLIGRAM(S): 15 TABLET ORAL at 18:47

## 2022-01-01 RX ADMIN — PIPERACILLIN AND TAZOBACTAM 25 GRAM(S): 4; .5 INJECTION, POWDER, LYOPHILIZED, FOR SOLUTION INTRAVENOUS at 10:09

## 2022-01-01 RX ADMIN — PIPERACILLIN AND TAZOBACTAM 25 GRAM(S): 4; .5 INJECTION, POWDER, LYOPHILIZED, FOR SOLUTION INTRAVENOUS at 18:05

## 2022-01-01 RX ADMIN — NYSTATIN CREAM 1 APPLICATION(S): 100000 CREAM TOPICAL at 05:49

## 2022-01-01 RX ADMIN — HEPARIN SODIUM 5000 UNIT(S): 5000 INJECTION INTRAVENOUS; SUBCUTANEOUS at 22:42

## 2022-01-01 RX ADMIN — HEPARIN SODIUM 5000 UNIT(S): 5000 INJECTION INTRAVENOUS; SUBCUTANEOUS at 22:10

## 2022-01-01 RX ADMIN — Medication 650 MILLIGRAM(S): at 05:15

## 2022-01-01 RX ADMIN — POLYETHYLENE GLYCOL 3350 17 GRAM(S): 17 POWDER, FOR SOLUTION ORAL at 13:11

## 2022-01-01 RX ADMIN — Medication 40 MILLIEQUIVALENT(S): at 21:54

## 2022-01-01 RX ADMIN — Medication 20 MILLIEQUIVALENT(S): at 23:39

## 2022-01-01 RX ADMIN — PIPERACILLIN AND TAZOBACTAM 25 GRAM(S): 4; .5 INJECTION, POWDER, LYOPHILIZED, FOR SOLUTION INTRAVENOUS at 01:07

## 2022-01-01 RX ADMIN — Medication 5 MILLIGRAM(S): at 17:10

## 2022-01-01 RX ADMIN — HEPARIN SODIUM 5000 UNIT(S): 5000 INJECTION INTRAVENOUS; SUBCUTANEOUS at 05:21

## 2022-01-01 RX ADMIN — Medication 12.5 GRAM(S): at 12:04

## 2022-01-01 RX ADMIN — Medication 650 MILLIGRAM(S): at 13:17

## 2022-01-01 RX ADMIN — HEPARIN SODIUM 5000 UNIT(S): 5000 INJECTION INTRAVENOUS; SUBCUTANEOUS at 15:01

## 2022-01-01 RX ADMIN — Medication 1 GRAM(S): at 17:19

## 2022-01-01 RX ADMIN — Medication 5 MILLIGRAM(S): at 15:14

## 2022-01-01 RX ADMIN — HEPARIN SODIUM 5000 UNIT(S): 5000 INJECTION INTRAVENOUS; SUBCUTANEOUS at 21:20

## 2022-01-01 RX ADMIN — FAMOTIDINE 20 MILLIGRAM(S): 10 INJECTION INTRAVENOUS at 17:41

## 2022-01-01 RX ADMIN — PIPERACILLIN AND TAZOBACTAM 25 GRAM(S): 4; .5 INJECTION, POWDER, LYOPHILIZED, FOR SOLUTION INTRAVENOUS at 09:13

## 2022-01-01 RX ADMIN — Medication 1 GRAM(S): at 13:10

## 2022-01-01 RX ADMIN — SENNA PLUS 2 TABLET(S): 8.6 TABLET ORAL at 23:07

## 2022-01-01 RX ADMIN — TAMSULOSIN HYDROCHLORIDE 0.4 MILLIGRAM(S): 0.4 CAPSULE ORAL at 12:27

## 2022-01-01 RX ADMIN — SENNA PLUS 2 TABLET(S): 8.6 TABLET ORAL at 22:10

## 2022-01-01 RX ADMIN — PIPERACILLIN AND TAZOBACTAM 25 GRAM(S): 4; .5 INJECTION, POWDER, LYOPHILIZED, FOR SOLUTION INTRAVENOUS at 09:21

## 2022-01-01 RX ADMIN — Medication 1 GRAM(S): at 17:28

## 2022-01-01 RX ADMIN — HEPARIN SODIUM 5000 UNIT(S): 5000 INJECTION INTRAVENOUS; SUBCUTANEOUS at 13:43

## 2022-01-01 RX ADMIN — HEPARIN SODIUM 5000 UNIT(S): 5000 INJECTION INTRAVENOUS; SUBCUTANEOUS at 15:51

## 2022-01-01 RX ADMIN — Medication 1 GRAM(S): at 17:07

## 2022-01-01 RX ADMIN — HEPARIN SODIUM 5000 UNIT(S): 5000 INJECTION INTRAVENOUS; SUBCUTANEOUS at 06:08

## 2022-01-01 RX ADMIN — Medication 1 GRAM(S): at 12:02

## 2022-01-01 RX ADMIN — Medication 5 MILLIGRAM(S): at 23:44

## 2022-01-01 RX ADMIN — PIPERACILLIN AND TAZOBACTAM 25 GRAM(S): 4; .5 INJECTION, POWDER, LYOPHILIZED, FOR SOLUTION INTRAVENOUS at 09:18

## 2022-01-01 RX ADMIN — Medication 1 GRAM(S): at 05:11

## 2022-01-01 RX ADMIN — Medication 1 GRAM(S): at 05:09

## 2022-01-01 RX ADMIN — SODIUM CHLORIDE 55 MILLILITER(S): 9 INJECTION INTRAMUSCULAR; INTRAVENOUS; SUBCUTANEOUS at 23:25

## 2022-01-01 RX ADMIN — Medication 1 GRAM(S): at 21:19

## 2022-01-01 RX ADMIN — PIPERACILLIN AND TAZOBACTAM 25 GRAM(S): 4; .5 INJECTION, POWDER, LYOPHILIZED, FOR SOLUTION INTRAVENOUS at 10:36

## 2022-01-01 RX ADMIN — TAMSULOSIN HYDROCHLORIDE 0.4 MILLIGRAM(S): 0.4 CAPSULE ORAL at 11:07

## 2022-01-01 RX ADMIN — NYSTATIN CREAM 1 APPLICATION(S): 100000 CREAM TOPICAL at 06:10

## 2022-01-01 RX ADMIN — Medication 1 GRAM(S): at 12:45

## 2022-01-01 RX ADMIN — HEPARIN SODIUM 5000 UNIT(S): 5000 INJECTION INTRAVENOUS; SUBCUTANEOUS at 05:02

## 2022-01-01 RX ADMIN — Medication 5 MILLIGRAM(S): at 00:52

## 2022-01-01 RX ADMIN — Medication 15 GRAM(S): at 10:05

## 2022-01-01 RX ADMIN — TAMSULOSIN HYDROCHLORIDE 0.4 MILLIGRAM(S): 0.4 CAPSULE ORAL at 12:49

## 2022-01-01 RX ADMIN — Medication 1 GRAM(S): at 17:55

## 2022-01-01 RX ADMIN — TAMSULOSIN HYDROCHLORIDE 0.4 MILLIGRAM(S): 0.4 CAPSULE ORAL at 11:43

## 2022-01-01 RX ADMIN — HEPARIN SODIUM 5000 UNIT(S): 5000 INJECTION INTRAVENOUS; SUBCUTANEOUS at 14:51

## 2022-01-01 RX ADMIN — PIPERACILLIN AND TAZOBACTAM 25 GRAM(S): 4; .5 INJECTION, POWDER, LYOPHILIZED, FOR SOLUTION INTRAVENOUS at 17:31

## 2022-01-01 RX ADMIN — Medication 1 GRAM(S): at 11:42

## 2022-01-01 RX ADMIN — Medication 1 GRAM(S): at 00:11

## 2022-01-01 RX ADMIN — Medication 650 MILLIGRAM(S): at 23:06

## 2022-01-01 RX ADMIN — HEPARIN SODIUM 5000 UNIT(S): 5000 INJECTION INTRAVENOUS; SUBCUTANEOUS at 15:38

## 2022-01-01 RX ADMIN — HEPARIN SODIUM 5000 UNIT(S): 5000 INJECTION INTRAVENOUS; SUBCUTANEOUS at 21:44

## 2022-01-01 RX ADMIN — POLYETHYLENE GLYCOL 3350 17 GRAM(S): 17 POWDER, FOR SOLUTION ORAL at 11:14

## 2022-01-01 RX ADMIN — Medication 1 GRAM(S): at 23:27

## 2022-01-01 RX ADMIN — Medication 5 MILLIGRAM(S): at 21:43

## 2022-01-01 RX ADMIN — POLYETHYLENE GLYCOL 3350 17 GRAM(S): 17 POWDER, FOR SOLUTION ORAL at 13:30

## 2022-01-01 RX ADMIN — HEPARIN SODIUM 5000 UNIT(S): 5000 INJECTION INTRAVENOUS; SUBCUTANEOUS at 23:05

## 2022-01-01 RX ADMIN — HEPARIN SODIUM 5000 UNIT(S): 5000 INJECTION INTRAVENOUS; SUBCUTANEOUS at 21:27

## 2022-01-01 RX ADMIN — Medication 1 GRAM(S): at 06:05

## 2022-01-01 RX ADMIN — HEPARIN SODIUM 5000 UNIT(S): 5000 INJECTION INTRAVENOUS; SUBCUTANEOUS at 21:51

## 2022-01-01 RX ADMIN — TAMSULOSIN HYDROCHLORIDE 0.4 MILLIGRAM(S): 0.4 CAPSULE ORAL at 14:38

## 2022-01-01 RX ADMIN — Medication 600 MILLIGRAM(S): at 16:36

## 2022-01-01 RX ADMIN — HEPARIN SODIUM 5000 UNIT(S): 5000 INJECTION INTRAVENOUS; SUBCUTANEOUS at 06:02

## 2022-01-01 RX ADMIN — Medication 5 MILLIGRAM(S): at 15:28

## 2022-01-01 RX ADMIN — Medication 100 MILLIEQUIVALENT(S): at 02:02

## 2022-01-01 RX ADMIN — PIPERACILLIN AND TAZOBACTAM 25 GRAM(S): 4; .5 INJECTION, POWDER, LYOPHILIZED, FOR SOLUTION INTRAVENOUS at 02:11

## 2022-01-01 RX ADMIN — Medication 650 MILLIGRAM(S): at 17:29

## 2022-01-01 RX ADMIN — Medication 5 MILLIGRAM(S): at 13:42

## 2022-01-01 RX ADMIN — PIPERACILLIN AND TAZOBACTAM 25 GRAM(S): 4; .5 INJECTION, POWDER, LYOPHILIZED, FOR SOLUTION INTRAVENOUS at 01:32

## 2022-01-01 RX ADMIN — FAMOTIDINE 20 MILLIGRAM(S): 10 INJECTION INTRAVENOUS at 05:10

## 2022-01-01 RX ADMIN — Medication 1 GRAM(S): at 11:10

## 2022-01-01 RX ADMIN — HEPARIN SODIUM 5000 UNIT(S): 5000 INJECTION INTRAVENOUS; SUBCUTANEOUS at 23:34

## 2022-01-01 RX ADMIN — SODIUM CHLORIDE 500 MILLILITER(S): 9 INJECTION, SOLUTION INTRAVENOUS at 23:32

## 2022-01-01 RX ADMIN — HEPARIN SODIUM 5000 UNIT(S): 5000 INJECTION INTRAVENOUS; SUBCUTANEOUS at 05:09

## 2022-01-01 RX ADMIN — Medication 100 MILLIEQUIVALENT(S): at 04:10

## 2022-01-01 RX ADMIN — Medication 5 MILLIGRAM(S): at 05:10

## 2022-01-01 RX ADMIN — Medication 5 MILLIGRAM(S): at 17:28

## 2022-01-01 RX ADMIN — Medication 5 MILLIGRAM(S): at 13:30

## 2022-01-01 RX ADMIN — Medication 1 GRAM(S): at 23:39

## 2022-01-01 RX ADMIN — Medication 5 MILLIGRAM(S): at 12:45

## 2022-01-01 RX ADMIN — Medication 5 MILLIGRAM(S): at 05:09

## 2022-01-01 RX ADMIN — HEPARIN SODIUM 5000 UNIT(S): 5000 INJECTION INTRAVENOUS; SUBCUTANEOUS at 23:24

## 2022-01-01 RX ADMIN — Medication 5 MILLIGRAM(S): at 05:11

## 2022-01-01 RX ADMIN — HEPARIN SODIUM 5000 UNIT(S): 5000 INJECTION INTRAVENOUS; SUBCUTANEOUS at 21:05

## 2022-01-01 RX ADMIN — Medication 25 GRAM(S): at 13:06

## 2022-01-01 RX ADMIN — TAMSULOSIN HYDROCHLORIDE 0.4 MILLIGRAM(S): 0.4 CAPSULE ORAL at 11:12

## 2022-01-01 RX ADMIN — SODIUM CHLORIDE 125 MILLILITER(S): 9 INJECTION, SOLUTION INTRAVENOUS at 21:53

## 2022-01-01 RX ADMIN — FAMOTIDINE 20 MILLIGRAM(S): 10 INJECTION INTRAVENOUS at 18:04

## 2022-01-01 RX ADMIN — Medication 1 GRAM(S): at 17:42

## 2022-01-01 RX ADMIN — HEPARIN SODIUM 5000 UNIT(S): 5000 INJECTION INTRAVENOUS; SUBCUTANEOUS at 13:32

## 2022-01-01 RX ADMIN — PIPERACILLIN AND TAZOBACTAM 200 GRAM(S): 4; .5 INJECTION, POWDER, LYOPHILIZED, FOR SOLUTION INTRAVENOUS at 23:43

## 2022-01-01 RX ADMIN — SENNA PLUS 2 TABLET(S): 8.6 TABLET ORAL at 21:39

## 2022-01-01 RX ADMIN — PIPERACILLIN AND TAZOBACTAM 25 GRAM(S): 4; .5 INJECTION, POWDER, LYOPHILIZED, FOR SOLUTION INTRAVENOUS at 01:53

## 2022-01-01 RX ADMIN — SODIUM CHLORIDE 100 MILLILITER(S): 9 INJECTION, SOLUTION INTRAVENOUS at 10:37

## 2022-01-01 RX ADMIN — Medication 650 MILLIGRAM(S): at 13:43

## 2022-01-01 RX ADMIN — FAMOTIDINE 20 MILLIGRAM(S): 10 INJECTION INTRAVENOUS at 05:09

## 2022-01-01 RX ADMIN — SODIUM CHLORIDE 1000 MILLILITER(S): 9 INJECTION INTRAMUSCULAR; INTRAVENOUS; SUBCUTANEOUS at 13:00

## 2022-01-01 RX ADMIN — Medication 1 GRAM(S): at 21:44

## 2022-01-01 RX ADMIN — NYSTATIN CREAM 1 APPLICATION(S): 100000 CREAM TOPICAL at 18:47

## 2022-01-01 RX ADMIN — Medication 650 MILLIGRAM(S): at 06:05

## 2022-01-01 RX ADMIN — Medication 650 MILLIGRAM(S): at 21:53

## 2022-01-01 RX ADMIN — HEPARIN SODIUM 5000 UNIT(S): 5000 INJECTION INTRAVENOUS; SUBCUTANEOUS at 15:12

## 2022-01-01 RX ADMIN — HEPARIN SODIUM 5000 UNIT(S): 5000 INJECTION INTRAVENOUS; SUBCUTANEOUS at 14:38

## 2022-01-01 RX ADMIN — PIPERACILLIN AND TAZOBACTAM 25 GRAM(S): 4; .5 INJECTION, POWDER, LYOPHILIZED, FOR SOLUTION INTRAVENOUS at 18:29

## 2022-01-01 RX ADMIN — NYSTATIN CREAM 1 APPLICATION(S): 100000 CREAM TOPICAL at 18:30

## 2022-01-01 RX ADMIN — TOLVAPTAN 15 MILLIGRAM(S): 15 TABLET ORAL at 14:57

## 2022-01-01 RX ADMIN — TAMSULOSIN HYDROCHLORIDE 0.4 MILLIGRAM(S): 0.4 CAPSULE ORAL at 15:00

## 2022-01-01 RX ADMIN — Medication 5 MILLIGRAM(S): at 23:03

## 2022-01-01 RX ADMIN — Medication 5 MILLIGRAM(S): at 17:49

## 2022-01-01 RX ADMIN — PIPERACILLIN AND TAZOBACTAM 25 GRAM(S): 4; .5 INJECTION, POWDER, LYOPHILIZED, FOR SOLUTION INTRAVENOUS at 18:46

## 2022-01-01 RX ADMIN — PIPERACILLIN AND TAZOBACTAM 25 GRAM(S): 4; .5 INJECTION, POWDER, LYOPHILIZED, FOR SOLUTION INTRAVENOUS at 11:00

## 2022-01-01 RX ADMIN — TAMSULOSIN HYDROCHLORIDE 0.4 MILLIGRAM(S): 0.4 CAPSULE ORAL at 13:43

## 2022-01-01 RX ADMIN — Medication 5 MILLIGRAM(S): at 18:46

## 2022-01-01 RX ADMIN — FAMOTIDINE 20 MILLIGRAM(S): 10 INJECTION INTRAVENOUS at 05:55

## 2022-01-01 RX ADMIN — SENNA PLUS 2 TABLET(S): 8.6 TABLET ORAL at 21:50

## 2022-01-01 RX ADMIN — Medication 1 GRAM(S): at 12:29

## 2022-01-01 RX ADMIN — TAMSULOSIN HYDROCHLORIDE 0.4 MILLIGRAM(S): 0.4 CAPSULE ORAL at 14:08

## 2022-01-01 RX ADMIN — Medication 5 MILLIGRAM(S): at 04:57

## 2022-01-01 RX ADMIN — Medication 25 GRAM(S): at 23:36

## 2022-01-01 RX ADMIN — HEPARIN SODIUM 5000 UNIT(S): 5000 INJECTION INTRAVENOUS; SUBCUTANEOUS at 06:20

## 2022-01-01 RX ADMIN — NYSTATIN CREAM 1 APPLICATION(S): 100000 CREAM TOPICAL at 05:10

## 2022-01-01 RX ADMIN — Medication 650 MILLIGRAM(S): at 05:01

## 2022-01-01 RX ADMIN — Medication 5 MILLIGRAM(S): at 17:07

## 2022-01-01 RX ADMIN — Medication 5 MILLIGRAM(S): at 16:33

## 2022-01-01 RX ADMIN — PIPERACILLIN AND TAZOBACTAM 25 GRAM(S): 4; .5 INJECTION, POWDER, LYOPHILIZED, FOR SOLUTION INTRAVENOUS at 17:19

## 2022-01-01 RX ADMIN — Medication 1 GRAM(S): at 23:07

## 2022-01-01 RX ADMIN — FAMOTIDINE 20 MILLIGRAM(S): 10 INJECTION INTRAVENOUS at 21:36

## 2022-01-01 RX ADMIN — Medication 3 MILLILITER(S): at 11:14

## 2022-01-01 RX ADMIN — Medication 5 MILLIGRAM(S): at 11:05

## 2022-01-01 RX ADMIN — Medication 1 GRAM(S): at 11:14

## 2022-01-01 RX ADMIN — ONDANSETRON 4 MILLIGRAM(S): 8 TABLET, FILM COATED ORAL at 11:58

## 2022-01-01 RX ADMIN — FAMOTIDINE 20 MILLIGRAM(S): 10 INJECTION INTRAVENOUS at 18:46

## 2022-01-01 RX ADMIN — Medication 650 MILLIGRAM(S): at 05:09

## 2022-01-01 RX ADMIN — Medication 1 GRAM(S): at 17:49

## 2022-01-01 RX ADMIN — Medication 40 MILLIGRAM(S): at 14:07

## 2022-01-01 RX ADMIN — SENNA PLUS 2 TABLET(S): 8.6 TABLET ORAL at 21:55

## 2022-01-01 RX ADMIN — POLYETHYLENE GLYCOL 3350 17 GRAM(S): 17 POWDER, FOR SOLUTION ORAL at 13:42

## 2022-01-01 RX ADMIN — Medication 40 MILLIGRAM(S): at 11:34

## 2022-01-01 RX ADMIN — Medication 5 MILLIGRAM(S): at 05:15

## 2022-01-01 RX ADMIN — TAMSULOSIN HYDROCHLORIDE 0.4 MILLIGRAM(S): 0.4 CAPSULE ORAL at 12:29

## 2022-01-01 RX ADMIN — Medication 5 MILLIGRAM(S): at 21:55

## 2022-01-01 RX ADMIN — Medication 40 MILLIGRAM(S): at 17:08

## 2022-01-01 RX ADMIN — Medication 1 GRAM(S): at 14:37

## 2022-01-01 RX ADMIN — Medication 600 MILLIGRAM(S): at 16:57

## 2022-01-01 RX ADMIN — Medication 650 MILLIGRAM(S): at 23:27

## 2022-01-01 RX ADMIN — HEPARIN SODIUM 5000 UNIT(S): 5000 INJECTION INTRAVENOUS; SUBCUTANEOUS at 21:55

## 2022-01-01 RX ADMIN — Medication 650 MILLIGRAM(S): at 15:00

## 2022-01-01 RX ADMIN — NYSTATIN CREAM 1 APPLICATION(S): 100000 CREAM TOPICAL at 05:03

## 2022-01-01 RX ADMIN — Medication 650 MILLIGRAM(S): at 13:13

## 2022-06-29 PROBLEM — Z00.00 ENCOUNTER FOR PREVENTIVE HEALTH EXAMINATION: Status: ACTIVE | Noted: 2022-01-01

## 2022-06-30 NOTE — ED PROVIDER NOTE - NS ED ATTENDING STATEMENT MOD
This was a shared visit with the LENORE. I reviewed and verified the documentation and independently performed the documented:

## 2022-06-30 NOTE — ED PROVIDER NOTE - OBJECTIVE STATEMENT
81 year old female with pmhx HTN, chronic back and leg pain (wheel chair bound at baseline) presents to ED c/o vomiting. Per pt and aids yesterday had a f/up appointment and after appointment pt had episode of vomiting. Today patient 81 year old female with pmhx HTN, chronic back and leg pain (wheel chair bound at baseline) presents to ED c/o vomiting. Per pt and aids yesterday had a f/up appointment and after appointment pt had episode of vomiting. Today patient had wound care appointment for pressure wound to her R calf and after being laid flat pt vomited again. She reports that vomiting was non-bloody. Since has felt well. Denies recent illness, sick contacts, fevers, chest pain, sob, abd pain, diarrhea, urinary symptoms

## 2022-06-30 NOTE — H&P ADULT - HISTORY OF PRESENT ILLNESS
81 year old female with PMHx of HTN, chronic back and leg pain (wheelchair bound at baseline) presents to ED c/o vomiting x 2 days. Today patient had wound care appointment for pressure wound to her R calf and vomited again so was brought in by aide. Found to have a 5mm L distal stone on CT scan. Pt denies history of stones, no flank pain, dysuria, urgency, frequency or gross hematuria.

## 2022-06-30 NOTE — PROGRESS NOTE ADULT - SUBJECTIVE AND OBJECTIVE BOX
Post op Check    Pt seen and examined without complaints. Pain is controlled. Denies SOB/CP/N/V.     Vital Signs Last 24 Hrs  T(C): 36.3 (30 Jun 2022 22:00), Max: 36.5 (30 Jun 2022 16:00)  T(F): 97.3 (30 Jun 2022 22:00), Max: 97.7 (30 Jun 2022 16:00)  HR: 79 (30 Jun 2022 22:00) (73 - 105)  BP: 124/66 (30 Jun 2022 22:00) (96/64 - 146/71)  BP(mean): 88 (30 Jun 2022 22:00) (87 - 102)  RR: 16 (30 Jun 2022 22:00) (16 - 20)  SpO2: 98% (30 Jun 2022 22:00) (98% - 100%)    I&O's Summary    30 Jun 2022 07:01  -  30 Jun 2022 23:13  --------------------------------------------------------  IN: 625 mL / OUT: 550 mL / NET: 75 mL        Physical Exam  Gen: NAD, A&Ox3  Pulm: No respiratory distress, no subcostal retractions  Abd: Soft, NT, ND  : +macedo in place, urine clear yellow                          14.5   20.25 )-----------( 591      ( 30 Jun 2022 13:11 )             43.9       06-30    133<L>  |  99  |  34<H>  ----------------------------<  128<H>  4.7   |  20<L>  |  0.78    Ca    9.5      30 Jun 2022 13:11    TPro  5.1<L>  /  Alb  2.2<L>  /  TBili  0.4  /  DBili  x   /  AST  25  /  ALT  16  /  AlkPhos  129<H>  06-30

## 2022-06-30 NOTE — ED PROVIDER NOTE - PROGRESS NOTE DETAILS
Patient seen and evaluated by Urology, after discussing with attending decision made to admit to their service for stent placement   Keshia Sanz PA-C

## 2022-06-30 NOTE — ED ADULT NURSE NOTE - NSIMPLEMENTINTERV_GEN_ALL_ED
Implemented All Fall with Harm Risk Interventions:  [place] to call system. Call bell, personal items and telephone within reach. Instruct patient to call for assistance. Room bathroom lighting operational. Non-slip footwear when patient is off stretcher. Physically safe environment: no spills, clutter or unnecessary equipment. Stretcher in lowest position, wheels locked, appropriate side rails in place. Provide visual cue, wrist band, yellow gown, etc. Monitor gait and stability. Monitor for mental status changes and reorient to person, place, and time. Review medications for side effects contributing to fall risk. Reinforce activity limits and safety measures with patient and family. Provide visual clues: red socks.

## 2022-06-30 NOTE — ED ADULT NURSE REASSESSMENT NOTE - NS ED NURSE REASSESS COMMENT FT1
Pts personal wheelchair transported up to OR with patient Pts personal wheelchair transported up to OR with patient. OR RN made aware

## 2022-06-30 NOTE — H&P ADULT - NSHPPHYSICALEXAM_GEN_ALL_CORE
Physical Exam  Gen: NAD  HEENT: normocephalic, atraumatic, no scleral icterus  Pulm: CTA b/l, No respiratory distress, no subcostal retractions, no accessory muscle use   CV: S1 S2, RRR,  no JVD  Abd: Soft, NT, ND, no rebound tenderness or guarding  MSK:  No CVAT, Moving all extremities, right lower leg wrapped in bandage   NEURO: A&Ox3, no focal neurological deficits, CN 2-12 grossly intact  SKIN: warm, dry, no rash.

## 2022-06-30 NOTE — PROGRESS NOTE ADULT - ASSESSMENT
A/P: 81y Female s/p zosyn  -DVT prophylaxis/OOB  -Incentive spirometry  -Strict I&O's  -Analgesia and antiemetics as needed  -Diet  -AM labs  -f/u cx  -keep macedo  -continue zosyn

## 2022-06-30 NOTE — ED PROVIDER NOTE - CLINICAL SUMMARY MEDICAL DECISION MAKING FREE TEXT BOX
Gisesll: 81 year old female here with vomiting. hadwound care appointment and had voimting twice today. not typical for patient. abdomen soft nt/nd. will get labs, ivf, ct a/p, reassess

## 2022-06-30 NOTE — BRIEF OPERATIVE NOTE - OPERATION/FINDINGS
Cysto, left ureteral stent placement  Emergent case in setting of sepsis and obstructing left ureteral stone Cysto, left ureteral stent placement  Emergent case in setting of sepsis and obstructing left ureteral stone  #[id number] Chris Nino(Attending)

## 2022-06-30 NOTE — H&P ADULT - ASSESSMENT
81 year old female with PMHx of HTN, chronic back and leg pain (wheelchair bound at baseline) presents to ED c/o vomiting, found to have a right distal stone, leukocytosis 20, UTI,   normal SCr 0.78

## 2022-06-30 NOTE — H&P ADULT - NSHPLABSRESULTS_GEN_ALL_CORE
14.5   20.25 )-----------( 591      ( 2022 13:11 )             43.9         133<L>  |  99  |  34<H>  ----------------------------<  128<H>  4.7   |  20<L>  |  0.78    Ca    9.5      2022 13:11    TPro  5.1<L>  /  Alb  2.2<L>  /  TBili  0.4  /  DBili  x   /  AST  25  /  ALT  16  /  AlkPhos  129<H>      Urinalysis Basic - ( 2022 15:12 )    Color: Yellow / Appearance: Turbid / S.020 / pH: x  Gluc: x / Ketone: Trace  / Bili: Negative / Urobili: Negative   Blood: x / Protein: 30 mg/dL / Nitrite: Negative   Leuk Esterase: Large / RBC: 1 /hpf / WBC >50   Sq Epi: x / Non Sq Epi: 3 /hpf / Bacteria: Many    < from: CT Abdomen and Pelvis w/ IV Cont (22 @ 13:32) >      IMPRESSION:  Mild left hydroureteronephrosis secondary to a 5 mm calculus in the   distal ureter. Diffuse urothelial enhancement of the left renal pelvis   and ureter as well as the bladder with subtle perivesicular fat   stranding, concerning for urinary tract infection.    Rectum markedly distended with stool.    Prominent endometrial thickness measuring 8 mm. Pelvic ultrasound can be   performed for further evaluation.    Small bilateral pleural effusions.    --- End of Report ---    < end of copied text >

## 2022-06-30 NOTE — H&P ADULT - PROBLEM SELECTOR PLAN 1
-NPO for emergent stent placement   -IV fluid hydration  -Analgesia and antiemetics prn  -Flomax 0.4mg   -Follow up urine culture   -Continue broad spectrum abx   -AM labs  -Discussed risks, benefits, alternatives, pt voiced understanding

## 2022-06-30 NOTE — H&P ADULT - ATTENDING COMMENTS
pt seen and examined  concerning for sepsis due to uti, with obstructing left ureteral stone  urgent decompression indicated, cultures and iv abx  for cysto left stent

## 2022-06-30 NOTE — PATIENT PROFILE ADULT - FALL HARM RISK - HARM RISK INTERVENTIONS
Assistance with ambulation/Assistance OOB with selected safe patient handling equipment/Communicate Risk of Fall with Harm to all staff/Discuss with provider need for PT consult/Monitor gait and stability/Orthostatic vital signs/Provide patient with walking aids - walker, cane, crutches/Reinforce activity limits and safety measures with patient and family/Reorient to person, place and time as needed/Review medications for side effects contributing to fall risk/Sit up slowly, dangle for a short time, stand at bedside before walking/Tailored Fall Risk Interventions/Toileting schedule using arm’s reach rule for commode and bathroom/Use of alarms - bed, chair and/or voice tab/Visual Cue: Yellow wristband and red socks/Bed in lowest position, wheels locked, appropriate side rails in place/Call bell, personal items and telephone in reach/Instruct patient to call for assistance before getting out of bed or chair/Non-slip footwear when patient is out of bed/[place] to call system/Physically safe environment - no spills, clutter or unnecessary equipment/Purposeful Proactive Rounding/Room/bathroom lighting operational, light cord in reach

## 2022-06-30 NOTE — ED ADULT NURSE NOTE - OBJECTIVE STATEMENT
82 yo F with pmhx of HTN, chronic back pain, RLE wound , sees a wound MD who sent her in to the ED today after 2 episodes of vomiting. once yesterday and once this morning while at the office. Pt asymptomatic on arrival. denies chest pain, shortness of breath, headache, nausea, diarrhea, abdominal pain, fevers, chills, urinary symptoms, hematuria.

## 2022-06-30 NOTE — ED PROVIDER NOTE - PHYSICAL EXAMINATION
CONSTITUTIONAL: Patient is awake, alert and oriented x 3. Patient is well appearing and in no acute distress  HEAD: NCAT  NECK: supple, FROM  LUNGS: CTA B/L, no wheezing or rales   HEART: RRR.+S1S2 no murmurs  ABDOMEN: Soft, non-distended, nttp, no rebound or guarding  EXTREMITY: no edema or calf tenderness b/l, RLE with dressing overlying distal extremity, FROM upper and lower ext b/l  NEURO: No focal deficits

## 2022-07-01 NOTE — CONSULT NOTE ADULT - SUBJECTIVE AND OBJECTIVE BOX
HPI:  81 year old female with PMHx of HTN, chronic back and leg pain (wheelchair bound at baseline) presents to ED c/o vomiting x 2 days. Today patient had wound care appointment for pressure wound to her R calf and vomited again so was brought in by aide. Found to have a 5mm L distal stone on CT scan. Pt denies history of stones, no flank pain, dysuria, urgency, frequency or gross hematuria.  (2022 17:15)      PAST MEDICAL & SURGICAL HISTORY:  Hypertension      Chronic back pain      Wheelchair bound      Left nephrolithiasis          Review of Systems:   CONSTITUTIONAL: No fever, weight loss, or fatigue  EYES: No eye pain, visual disturbances, or discharge  ENMT:  No difficulty hearing, tinnitus, vertigo; No sinus or throat pain  NECK: No pain or stiffness  BREASTS: No pain, masses, or nipple discharge  RESPIRATORY: No cough, wheezing, chills or hemoptysis; No shortness of breath  CARDIOVASCULAR: No chest pain, palpitations, dizziness, or leg swelling  GASTROINTESTINAL: No abdominal or epigastric pain. No nausea, vomiting, or hematemesis; No diarrhea or constipation. No melena or hematochezia.  GENITOURINARY: No dysuria, frequency, hematuria, or incontinence  NEUROLOGICAL: No headaches, memory loss, loss of strength, numbness, or tremors  SKIN: No itching, burning, rashes, or lesions   LYMPH NODES: No enlarged glands  ENDOCRINE: No heat or cold intolerance; No hair loss  MUSCULOSKELETAL: No joint pain or swelling; No muscle, back, or extremity pain  PSYCHIATRIC: No depression, anxiety, mood swings, or difficulty sleeping  HEME/LYMPH: No easy bruising, or bleeding gums  ALLERY AND IMMUNOLOGIC: No hives or eczema    Allergies    No Known Allergies    Intolerances        Social History:     FAMILY HISTORY:      MEDICATIONS  (STANDING):  dextrose 5% + sodium chloride 0.45%. 1000 milliLiter(s) (100 mL/Hr) IV Continuous <Continuous>  heparin   Injectable 5000 Unit(s) SubCutaneous every 8 hours  piperacillin/tazobactam IVPB.. 3.375 Gram(s) IV Intermittent every 8 hours  polyethylene glycol 3350 17 Gram(s) Oral daily  senna 2 Tablet(s) Oral at bedtime  tamsulosin 0.4 milliGRAM(s) Oral daily    MEDICATIONS  (PRN):  acetaminophen     Tablet .. 650 milliGRAM(s) Oral every 6 hours PRN Temp greater or equal to 38C (100.4F), Mild Pain (1 - 3)  guaiFENesin  milliGRAM(s) Oral every 12 hours PRN Cough  ondansetron Injectable 4 milliGRAM(s) IV Push every 6 hours PRN Nausea and/or Vomiting        CAPILLARY BLOOD GLUCOSE      POCT Blood Glucose.: 97 mg/dL (2022 10:30)  POCT Blood Glucose.: 85 mg/dL (2022 10:00)  POCT Blood Glucose.: 66 mg/dL (2022 09:22)  POCT Blood Glucose.: 64 mg/dL (2022 08:58)    I&O's Summary    2022 07:01  -  2022 07:00  --------------------------------------------------------  IN: 1625 mL / OUT: 680 mL / NET: 945 mL    2022 07:01  -  2022 03:41  --------------------------------------------------------  IN: 2345 mL / OUT: 755 mL / NET: 1590 mL        PHYSICAL EXAM:  GENERAL: NAD, well-developed  HEAD:  Atraumatic, Normocephalic  EYES: EOMI, PERRLA, conjunctiva and sclera clear  NECK: Supple, No JVD  CHEST/LUNG: Clear to auscultation bilaterally; No wheeze  HEART: Regular rate and rhythm; No murmurs, rubs, or gallops  ABDOMEN: Soft, Nontender, Nondistended; Bowel sounds present  EXTREMITIES:  2+ Peripheral Pulses, No clubbing, cyanosis, or edema  PSYCH: AAOx3  NEUROLOGY: non-focal  SKIN: No rashes or lesions    LABS:                        12.9   13.14 )-----------( 457      ( 2022 07:17 )             39.9     07-01    133<L>  |  104  |  30<H>  ----------------------------<  45<LL>  4.6   |  17<L>  |  0.64    Ca    8.3<L>      2022 07:17    TPro  5.1<L>  /  Alb  2.2<L>  /  TBili  0.4  /  DBili  x   /  AST  25  /  ALT  16  /  AlkPhos  129<H>  06-30    PT/INR - ( 2022 15:47 )   PT: 11.7 sec;   INR: 1.02 ratio         PTT - ( 2022 15:47 )  PTT:28.0 sec      Urinalysis Basic - ( 2022 15:12 )    Color: Yellow / Appearance: Turbid / S.020 / pH: x  Gluc: x / Ketone: Trace  / Bili: Negative / Urobili: Negative   Blood: x / Protein: 30 mg/dL / Nitrite: Negative   Leuk Esterase: Large / RBC: 1 /hpf / WBC >50   Sq Epi: x / Non Sq Epi: 3 /hpf / Bacteria: Many        RADIOLOGY & ADDITIONAL TESTS:    Imaging Personally Reviewed:    Consultant(s) Notes Reviewed:      Care Discussed with Consultants/Other Providers:

## 2022-07-01 NOTE — PROGRESS NOTE ADULT - SUBJECTIVE AND OBJECTIVE BOX
The patient was seen and examined at bedside.  No acute events overnight and the patient is without acute complaints this AM.    T(C): 36.7 (07-01-22 @ 05:05), Max: 37 (06-30-22 @ 10:45)  HR: 73 (07-01-22 @ 05:05) (73 - 105)  BP: 108/69 (07-01-22 @ 05:05) (96/64 - 148/95)  RR: 18 (07-01-22 @ 05:05) (16 - 20)  SpO2: 96% (07-01-22 @ 05:05) (96% - 100%)  Wt(kg): --    Physical Exam:    General: NAD, A+Ox3  Abdomen: soft, non-tender, non-distended        06-30 @ 07:01  -  07-01 @ 07:00  --------------------------------------------------------  IN: 1625 mL / OUT: 680 mL / NET: 945 mL      Vazquez - 550cc clear

## 2022-07-01 NOTE — CONSULT NOTE ADULT - ASSESSMENT
81 year old female with PMHx of HTN, chronic back and leg pain (wheelchair bound at baseline) presents to ED c/o vomiting x 2 days.   Found to have a 5mm L distal stone on CT scan. Pt denies history of stones, no flank pain, dysuria, urgency, frequency or gross hematuria.        Nephrolithiasis s/p stent placement   Patient afebrile   Cultures shsow Emterococci om Zosyn   Follow up senstivities   Urology following     HTN Controlled off meds     Chronic wounds wound care consult appreciated

## 2022-07-01 NOTE — CONSULT NOTE ADULT - SUBJECTIVE AND OBJECTIVE BOX
Wound SURGERY CONSULT NOTE    HPI:  81 year old female with PMHx of HTN, chronic back and leg pain (wheelchair bound at baseline) presents to ED c/o vomiting x 2 days. Today patient had wound care appointment for pressure wound to her R calf and vomited again so was brought in by aide. Found to have a 5mm L distal stone on CT scan. Pt denies history of stones, no flank pain, dysuria, urgency, frequency or gross hematuria.    Wound consult requested by team to assist w/ management of multiple wounds noted on admission. Pt w/o c/o pain, drainage, odor, color change,  or swelling. Offloading and pericare initiated as pt sedentary.  Pt is Incontinent of urine & stool. Pt denies H/o falls, trauma, or bites.  DSD used at home and while awaiting consult.  Appetite good w/o weight loss.  All questions asked and answered to pt's and family's expressed understanding and satisfaction.    Current Diet: Diet, Regular (06-30-22 @ 19:18)      PAST MEDICAL & SURGICAL HISTORY:  Hypertension      Chronic back pain      Wheelchair bound      Left nephrolithiasis      REVIEW OF SYSTEMS: General/ Skin/  MSK: see HPI  All other systems negative    MEDICATIONS  (STANDING):  dextrose 5% + sodium chloride 0.45%. 1000 milliLiter(s) (75 mL/Hr) IV Continuous <Continuous>  heparin   Injectable 5000 Unit(s) SubCutaneous every 8 hours  piperacillin/tazobactam IVPB.. 3.375 Gram(s) IV Intermittent every 8 hours  polyethylene glycol 3350 17 Gram(s) Oral daily  senna 2 Tablet(s) Oral at bedtime  tamsulosin 0.4 milliGRAM(s) Oral daily    MEDICATIONS  (PRN):  acetaminophen     Tablet .. 650 milliGRAM(s) Oral every 6 hours PRN Temp greater or equal to 38C (100.4F), Mild Pain (1 - 3)  ondansetron Injectable 4 milliGRAM(s) IV Push every 6 hours PRN Nausea and/or Vomiting      No Known Allergies    SOCIAL HISTORY: single; (+)HHA; Denies smoking, ETOH, drugs    FAMILY HISTORY: no significant problems    PHYSICAL EXAM:  Vital Signs Last 24 Hrs  T(C): 36.8 (01 Jul 2022 13:27), Max: 36.8 (01 Jul 2022 13:27)  T(F): 98.2 (01 Jul 2022 13:27), Max: 98.2 (01 Jul 2022 13:27)  HR: 87 (01 Jul 2022 13:27) (73 - 93)  BP: 113/74 (01 Jul 2022 13:27) (108/69 - 148/95)  BP(mean): 88 (30 Jun 2022 22:00) (87 - 102)  RR: 18 (01 Jul 2022 13:27) (16 - 18)  SpO2: 98% (01 Jul 2022 13:27) (96% - 100%)    NAD,  A&Ox3, Obese, frail  [facility] [facility] P500 bed  HEENT:  NC/AT, EOMI, sclera clear, mucosa moist, throat clear, trachea midline, neck supple  Respiratory: nonlabored w equal chest rise  Gastrointestinal soft NT/ND   Neurology:  weakened strength & sensation grossly intact  Psych: calm/ appropriate  Musculoskeletal: FROM, no deformities/ contractures  Vascular: BLE equally warm,  no cyanosis, clubbing,  nor acute ischemia noted       mild BLE edema equal, faint BLE DP/PT pulses palpable      BLE hemosiderin staining      Lt heel w/ resolving dry blister / dti 4cm x 5cm x 0cm      Rt lateral calf wound 10cmx 3.5cm x 0.5cm w/ moist and granular tissue and necrotic tissue          distal 3rd w/ DTI w/o blistering      (+)serosanguinous drainage  No odor, erythema, increased warmth, tenderness, induration, fluctuance, nor crepitus  Skin: pale, frail,  ecchymosis w/o hematoma      Bilateral buttocks/ sacrum w/ hyperpigmentation of incontinence dermatitis      Rt sacrum DTI w/ epidermal slippage and denuded skin        3.5cm x 4cm x 0cm        no active drainage  No odor, erythema, increased warmth, tenderness, induration, fluctuance, nor crepitus    LABS/ CULTURES/ RADIOLOGY:                        12.9   13.14 )-----------( 457      ( 01 Jul 2022 07:17 )             39.9       133  |  104  |  30  ----------------------------<  45      [07-01-22 @ 07:17]  4.6   |  17  |  0.64        Ca     8.3     [07-01-22 @ 07:17]    TPro  5.1  /  Alb  2.2  /  TBili  0.4  /  DBili  x   /  AST  25  /  ALT  16  /  AlkPhos  129  [06-30-22 @ 13:11]    PT/INR: PT 11.7 , INR 1.02       [06-30-22 @ 15:47]  PTT: 28.0       [06-30-22 @ 15:47]

## 2022-07-01 NOTE — PHYSICAL THERAPY INITIAL EVALUATION ADULT - ADDITIONAL COMMENTS
Pt resides in a pvt home, no steps to enter, first floor set up inside. Pt states she has a hospital bed, RW, & wheelchair at home. States HHA & sister help her from bed to chair, although mostly spends her time in bed, does NOT ambulate. States she requires assistance for all ADL's & mobility.

## 2022-07-01 NOTE — CONSULT NOTE ADULT - ASSESSMENT
A/P: 81 year old female s/p left ureteral stent placement for a ureteral stone and fevers, POD#1      Wound Consult requested to assist w/ management of Sacral/ buttocks DTI  Incontinence of urine and stool w/ dermatitis   RLE wound  Lt heel DTI  BLE PVD      RLE wounds- medihoney dressing QD  Lt heel- Cavilon QD  Consider DMITRI/PVR, XRay, RLE wounds  BLE elevation & Compression  Buttocks/ Sacrum: HARMEET cream BIDand prn soiling //       Continue w/ attends under pads and Pericare as per protocol  Abx per   Moisturize intact skin w/ SWEEN cream BID  Nutrition Consult for optimization        encourage high quality protein, MVI & Vit C to promote wound healing  Hyperglycemia - improving w/ ADA diet and Lantus/ NPH,         FS w/ ISS q6h/ qmeal & qhs, consider Endo Consult, consider HgA1c  Anemia- improving transfusions, Fe studies, protonix  Continue turning and positioning w/ offloading assistive devices as per protocol  Waffle Cushion to chair when oob to chair  Continue w/ low air loss pressure redistribution bed surface   Care as per , will follow w/ you  Upon discharge [facility] [address] [place] [address] [phone]  Seen w/ attng & RN and D/w team  Thank you for this consult  I spent 50minutes face to face w/ this pt of which more than 50% of the time was spent counseling & coordinating care of this pt.   Karla Rivera PA-C [id number]

## 2022-07-01 NOTE — PHYSICAL THERAPY INITIAL EVALUATION ADULT - STRENGTHENING, PT EVAL
3. GOAL: In 3 weeks, pt will inc overall strength by 1/2 MMT grade to improve overall functional mobility.

## 2022-07-01 NOTE — PHYSICAL THERAPY INITIAL EVALUATION ADULT - PERTINENT HX OF CURRENT PROBLEM, REHAB EVAL
81 y.o. F PMH HTN, chronic back & leg pain (wheelchair bound at baseline) presents to ED c/o vomiting x 2 days. Pt had wound care appointment for pressure wound to her R calf & vomited again so was brought in by aide. Found to have a 5mm L distal stone on CT scan. Denies history of stones, no flank pain, dysuria, urgency, frequency or gross hematuria. Now s/p emergent left ureteral stent placement for a ureteral stone and fevers on 6/30/22.

## 2022-07-01 NOTE — PROGRESS NOTE ADULT - ASSESSMENT
81 year old female s/p left ureteral stent placement for a ureteral stone and fevers, POD#1    -AM labs  -f/u blood, urine, and kidney cultures  -analgesia as needed  -macedo for 24 hrs afebrile  -continue zosyn  -f/u medicine (Dr Sims) and wound care (sacral ulcer) recommendations  -OOB, DVT prophylaxis, incentive spirometry

## 2022-07-02 PROBLEM — Z99.3 WHEELCHAIR BOUND: Status: ACTIVE | Noted: 2022-01-01

## 2022-07-02 PROBLEM — R41.82 ALTERED MENTAL STATUS: Status: RESOLVED | Noted: 2022-01-01 | Resolved: 2022-01-01

## 2022-07-02 PROBLEM — Z87.442 HISTORY OF KIDNEY STONES: Status: ACTIVE | Noted: 2022-01-01

## 2022-07-02 PROBLEM — I10 CHRONIC HYPERTENSION: Status: ACTIVE | Noted: 2022-01-01

## 2022-07-02 PROBLEM — S81.801A OPEN WOUND OF RIGHT LOWER LEG: Status: ACTIVE | Noted: 2022-01-01

## 2022-07-02 PROBLEM — Z78.9 CURRENT NON-SMOKER: Status: ACTIVE | Noted: 2022-01-01

## 2022-07-02 PROBLEM — R14.0 ABDOMINAL DISTENTION: Status: RESOLVED | Noted: 2022-01-01 | Resolved: 2022-01-01

## 2022-07-02 PROBLEM — Z87.898 HISTORY OF NAUSEA AND VOMITING: Status: RESOLVED | Noted: 2022-01-01 | Resolved: 2022-01-01

## 2022-07-02 PROBLEM — S89.90XA LEG INJURY: Status: ACTIVE | Noted: 2022-01-01

## 2022-07-02 NOTE — REVIEW OF SYSTEMS
[Feeling Poorly] : feeling poorly [Abdominal Pain] : abdominal pain [Vomiting] : vomiting [Negative] : Heme/Lymph

## 2022-07-02 NOTE — REASON FOR VISIT
[Consultation] : a consultation visit [Formal Caregiver] : formal caregiver [FreeTextEntry1] : right leg

## 2022-07-02 NOTE — ASSESSMENT
Nuclear Sclerotic Cataract OU: Explained how cataracts can effect vision. Recommend clinical observation. The patient was advised to contact us if any change or worsening of vision.  F/u 1 year [FreeTextEntry1] : 81 yr old with  traumatic leg ulcer from ? wheelchair injury back of calf right\par Wound on Right posterior leg two weeks ago\par clean, \par s/p excisional debridement with curette\par santyl wet to dry dakins/ vash\par soap and water to cleanse\par kerlex ace wrap\par vomiting bile- discussed with proxy- to be transported home then to the er for eval\par discussed risk of aspiration\par \par Aide says wound came from the wheelchair\par  datacomplexity high lab, xr, old rec, test resultsreview,, visualize imagecptreview previous\par risk- high surgery\par

## 2022-07-02 NOTE — HISTORY OF PRESENT ILLNESS
[FreeTextEntry1] : Ms. OMAR HORAN   presents to the office with a wound since mid-June 2022. The wound is located on  the right posterior leg The patient has complaints of  pain--on leg-----.  The patient has been dressing the wound with -gauze-----.  The patient denies fevers or chills. \par The patient has localized pain to the wound upon dressing changes. \par The patient has no other complaints or associated symptoms. HbA1c is ----.\par \par Patient is wheelchair bound, ? cva  or dementia\par here with 2 aides, will call proxy\par nausea and spitting up bilious vomit\par \par

## 2022-07-02 NOTE — PROGRESS NOTE ADULT - SUBJECTIVE AND OBJECTIVE BOX
UROLOGY DAILY PROGRESS NOTE:     Subjective: Patient seen and examined at bedside. No overnight events.       Objective:  Vital signs  T(F): , Max: 98.2 (07-01-22 @ 13:27)  HR: 94 (07-02-22 @ 07:00)  BP: 125/84 (07-02-22 @ 07:00)  SpO2: 98% (07-02-22 @ 07:00)  Wt(kg): --    I&O's Summary    01 Jul 2022 07:01  -  02 Jul 2022 07:00  --------------------------------------------------------  IN: 3645 mL / OUT: 1305 mL / NET: 2340 mL    I&O's Detail    01 Jul 2022 07:01  -  02 Jul 2022 07:00  --------------------------------------------------------  IN:    dextrose 5% + sodium chloride 0.45%: 450 mL    dextrose 5% + sodium chloride 0.45%: 1375 mL    Lactated Ringers: 500 mL    Lactated Ringers Bolus: 500 mL    Oral Fluid: 820 mL  Total IN: 3645 mL    OUT:    Indwelling Catheter - Urethral (mL): 1305 mL  Total OUT: 1305 mL    Total NET: 2340 mL          Gen: NAD  Pulm: No respiratory distress, no subcostal retractions  CV: RRR, no JVD  Abd: Soft, NT, ND  : Vazquez- clear urine     Labs:  07-02  11.02 / 39.0  /0.55   07-01  13.14 / 39.9  /0.64                           12.7   11.02 )-----------( 463      ( 02 Jul 2022 07:19 )             39.0     07-02    132<L>  |  104  |  20  ----------------------------<  118<H>  3.1<L>   |  19<L>  |  0.55    Ca    8.1<L>      02 Jul 2022 07:19    TPro  5.1<L>  /  Alb  2.2<L>  /  TBili  0.4  /  DBili  x   /  AST  25  /  ALT  16  /  AlkPhos  129<H>  06-30    PT/INR - ( 30 Jun 2022 15:47 )   PT: 11.7 sec;   INR: 1.02 ratio         PTT - ( 30 Jun 2022 15:47 )  PTT:28.0 sec    Urine Cx:  Culture - Urine (06.30.22 @ 22:44)    Specimen Source: Kidney Kidney    Culture Results:   Moderate Enterococcus faecalis    Culture - Urine (06.30.22 @ 15:12)    Specimen Source: Clean Catch Clean Catch (Midstream)    Culture Results:   10,000 - 49,000 CFU/mL Klebsiella pneumoniae  >100,000 CFU/ml Gram positive organisms    Culture - Blood (06.30.22 @ 21:45)    Specimen Source: .Blood Blood-Venous    Culture Results:   No growth to date.

## 2022-07-02 NOTE — PHYSICAL EXAM
[1+] : left 1+ [JVD] : no jugular venous distention  [Normal Breath Sounds] : Normal breath sounds [Respiratory Effort] : normal respiratory effort [Normal Rate and Rhythm] : normal rate and rhythm [Ankle Swelling (On Exam)] : present [2+] : left 2+ [Abdomen Tenderness] : ~T ~M Abdominal tenderness [Skin Ulcer] : ulcer [Alert] : alert [Oriented to Person] : disoriented to person [Oriented to Place] : disoriented to place [Oriented to Time] : disoriented to time [Calm] : calm [de-identified] : non verbal [de-identified] : with change in position [de-identified] : non icteric [de-identified] : distended [de-identified] : diaper [Please See PDF for [employer]] : Please See PDF for [employer].

## 2022-07-02 NOTE — PLAN
[FreeTextEntry1] : Plan-\par Apply lidocaine or topical anesthetic if needed to reduce pain upon washing the wound.\par Wash wound with ---- Dove skin sensitive soap and clean water \par Apply ----santyl and Vashe wet to dry\par Apply ----ABD Pad, radha, and ACE bandage\par Change dressing ---daily\par \par Leg elevation as tolerated\par Encouraged ambulation or exercise.\par Optimization of nutrition.\par Offloading to the wound site.\par \par \par Wound supplies ordered via DME\par Patient given contact information to DME\par \par -----Homecare ordered\par \par Follow up appointment scheduled for -----\par \par TeleHealth Services discussed with the patient and/or family.  Discharge instructions given including download of [facility] information regarding:\par 1)  [facility] to obtain medical records\par 2)  [facility] to conduct Face-to-Face TeleHealth visit\par 3)  [employer] for the Patient (patient takes a picture of their wound which is sent to the patient's chart for review)\par \par

## 2022-07-02 NOTE — PROGRESS NOTE ADULT - ASSESSMENT
81 year old female s/p left ureteral stent placement for a ureteral stone and fevers    -AM labs  -f/u blood, urine, and kidney cultures  -analgesia as needed  -TOV   -continue zosyn  -f/u medicine, wound care  -OOB, DVT prophylaxis, incentive spirometry

## 2022-07-03 NOTE — DIETITIAN INITIAL EVALUATION ADULT - OTHER INFO
Nutrition Status:  - Inadequate nutrition >5 days in setting of N/V x 2 days PTA and po intake <25% in-house (6/3-7/3). Pt made NPO this morning in setting of N/V. Swallow evaluation ordered.  - Wound Care consulted for multiple wounds noted on admission.

## 2022-07-03 NOTE — DIETITIAN NUTRITION RISK NOTIFICATION - ADDITIONAL COMMENTS/DIETITIAN RECOMMENDATIONS
1. Monitor results of Swallow Evaluation. Resume Regular diet as able; defer diet texture/consistency to team/SLP.  2. Resume 3 servings Ensure Enlive as able; thickened if appropriate.  3. Recommend add multivitamin and vit C 500mg daily to promote wound healing (when diet is resumed)

## 2022-07-03 NOTE — DIETITIAN INITIAL EVALUATION ADULT - CONTINUE CURRENT NUTRITION CARE PLAN
Monitor results of Swallow Evaluation. Resume Regular diet as able; defer diet texture/consistency to team/SLP./yes

## 2022-07-03 NOTE — PROGRESS NOTE ADULT - ASSESSMENT
81 year old female s/p left ureteral stent placement for a ureteral stone and fevers    -AM labs  -f/u blood, urine, and kidney cultures  -analgesia as needed  -continue zosyn  -f/u medicine, wound care  - dc planning pending sensitivities   -OOB, DVT prophylaxis, incentive spirometry

## 2022-07-03 NOTE — DIETITIAN INITIAL EVALUATION ADULT - REASON FOR ADMISSION
Urinary tract infection    Per chart: "81 year old female with PMHx of HTN, chronic back and leg pain (wheelchair bound at baseline) presents to ED c/o vomiting x 2 days. Today patient had wound care appointment for pressure wound to her R calf and vomited again so was brought in by aide. Found to have a 5mm L distal stone on CT scan. Pt denies history of stones, no flank pain, dysuria, urgency, frequency or gross hematuria.  (30 Jun 2022 17:15)"

## 2022-07-03 NOTE — DIETITIAN INITIAL EVALUATION ADULT - NSICDXPASTMEDICALHX_GEN_ALL_CORE_FT
PAST MEDICAL HISTORY:  Chronic back pain     Hypertension     Left nephrolithiasis     Wheelchair bound

## 2022-07-03 NOTE — DIETITIAN INITIAL EVALUATION ADULT - ORAL INTAKE PTA/DIET HISTORY
Pt reports no nutrition concerns or dietary restrictions at home. Pt endorses N/V beginning 2 days PTA.  Allergies: NKFA

## 2022-07-03 NOTE — DIETITIAN INITIAL EVALUATION ADULT - PERTINENT LABORATORY DATA
07-03    133<L>  |  103  |  17  ----------------------------<  120<H>  3.8   |  16<L>  |  0.54    Ca    8.1<L>      03 Jul 2022 09:01

## 2022-07-03 NOTE — DIETITIAN INITIAL EVALUATION ADULT - NSFNSPHYEXAMSKINFT_GEN_A_CORE
Suspected DTI left heel  Suspected DTI right sacrum  Wound Care consulted for pressure injuries present on admission

## 2022-07-03 NOTE — DIETITIAN NUTRITION RISK NOTIFICATION - OTHER RISK FACTORS
"Edna Collins is a 15year old female presents today for   Chief Complaint   Patient presents with   â¢ Nausea     x 7 days     Edna Collins is a 15year old female presenting with nausea x 7 days. Patient reports on 11/17 she had several episodes of emesis, believed it to be a ""24 hour bug\"" because she was feeling better. Does report intermittent abdominal cramping and nausea since then, patient had another episode of emesis today and wants to be evaluated. Did not attempt any medications at home. ALLERGIES:  No Known Allergies    Denies Latex allergy or sensitivity    Medications: medications verified and updated    PCP: Jacquelyn Bobby MD  Visit Vitals  /61 (BP Location: [facility], Patient Position: Sitting, Cuff Size: Regular)   Pulse 70   Temp 98 Â°F (36.7 Â°C) (Oral)   Resp 16   SpO2 98%       Patient here with mother; Kit Gallahger to date with Immunizations: Yes    Patient would like communication of their results via:      Cell Phone:   Telephone Information:   Mobile [phone] to leave a message containing results? Yes    There are no preventive care reminders to display for this patient.     " Not applicable

## 2022-07-03 NOTE — DIETITIAN INITIAL EVALUATION ADULT - REASON INDICATOR FOR ASSESSMENT
Nutrition Consult for Pressure Injury >Stage II received and appreciated.   Information obtained from: patient, medical record, communication with team.

## 2022-07-03 NOTE — DIETITIAN INITIAL EVALUATION ADULT - PERTINENT MEDS FT
MEDICATIONS  (STANDING):  dextrose 5% + sodium chloride 0.45%. 1000 milliLiter(s) (100 mL/Hr) IV Continuous <Continuous>  heparin   Injectable 5000 Unit(s) SubCutaneous every 8 hours  piperacillin/tazobactam IVPB.. 3.375 Gram(s) IV Intermittent every 8 hours  polyethylene glycol 3350 17 Gram(s) Oral daily  senna 2 Tablet(s) Oral at bedtime  tamsulosin 0.4 milliGRAM(s) Oral daily    MEDICATIONS  (PRN):  acetaminophen     Tablet .. 650 milliGRAM(s) Oral every 6 hours PRN Temp greater or equal to 38C (100.4F), Mild Pain (1 - 3)  benzocaine 15 mG/menthol 3.6 mG Lozenge 1 Lozenge Oral three times a day PRN Sore Throat  guaiFENesin  milliGRAM(s) Oral every 12 hours PRN Cough  ondansetron Injectable 4 milliGRAM(s) IV Push every 6 hours PRN Nausea and/or Vomiting

## 2022-07-03 NOTE — DIETITIAN INITIAL EVALUATION ADULT - ETIOLOGY
N/V and inadequate po intake in setting of renal stone, s/p  left ureteral stent placement 6/30/22 increased physiologic demand of stress factor and wound healing

## 2022-07-03 NOTE — DIETITIAN INITIAL EVALUATION ADULT - ENERGY INTAKE
N/V x 2 days PTA  Pt ordered for Regular diet (6/30-7/3) with <25% meal consumption; Ensure Enlive ordered this morning 7/3.   Per chart, pt was refusing food out of fear of vomiting.  Pt made NPO 7/3, pending swallow evaluation. Poor (<50%)

## 2022-07-03 NOTE — PROGRESS NOTE ADULT - ASSESSMENT
81 year old female with PMHx of HTN, chronic back and leg pain (wheelchair bound at baseline) presents to ED c/o vomiting x 2 days.   Found to have a 5mm L distal stone on CT scan. Pt denies history of stones, no flank pain, dysuria, urgency, frequency or gross hematuria.        Nephrolithiasis s/p stent placement   Patient afebrile   Cultures shsow Emterococci klebsiella on Zosyn   Follow up senstivities Klebsiella sensitive to Zosyn   Urology following     HTN Controlled off meds     Chronic wounds wound care consult appreciated

## 2022-07-03 NOTE — PROGRESS NOTE ADULT - SUBJECTIVE AND OBJECTIVE BOX
UROLOGY DAILY PROGRESS NOTE:     Subjective: Patient seen and examined at bedside. No overnight events. Voiding.  Still with some nausea.       Objective:  Vital signs  T(F): , Max: 98.4 (07-02-22 @ 17:38)  HR: 84 (07-03-22 @ 05:05)  BP: 134/74 (07-03-22 @ 05:05)  SpO2: 97% (07-03-22 @ 05:05)  Wt(kg): --    I&O's Summary    02 Jul 2022 07:01  -  03 Jul 2022 07:00  --------------------------------------------------------  IN: 1390 mL / OUT: 0 mL / NET: 1390 mL        Gen: NAD  Pulm: No respiratory distress, no subcostal retractions  CV: RRR, no JVD  Abd: Soft, NT, ND      Labs:  07-02  11.02 / 39.0  /0.55                           12.7   11.02 )-----------( 463      ( 02 Jul 2022 07:19 )             39.0     07-02    132<L>  |  104  |  20  ----------------------------<  118<H>  3.1<L>   |  19<L>  |  0.55    Ca    8.1<L>      02 Jul 2022 07:19          Urine Cx:  Culture - Urine (06.30.22 @ 22:44)    Specimen Source: Kidney Kidney    Culture Results:   Moderate Enterococcus faecalis    Culture - Urine (06.30.22 @ 15:12)    -  Amikacin: S <=16    -  Amoxicillin/Clavulanic Acid: S <=8/4    -  Ampicillin: R >16 These ampicillin results predict results for amoxicillin    -  Ampicillin/Sulbactam: S <=4/2 Enterobacter, Klebsiella aerogenes, Citrobacter, and Serratia may develop resistance during prolonged therapy (3-4 days)    -  Aztreonam: S <=4    -  Cefazolin: S <=2 (MIC_CL_COM_ENTERIC_CEFAZU) For uncomplicated UTI with K. pneumoniae, E. coli, or P. mirablis: DREW <=16 is sensitive and DREW >=32 is resistant. This also predicts results for oral agents cefaclor, cefdinir, cefpodoxime, cefprozil, cefuroxime axetil, cephalexin and locarbef for uncomplicated UTI. Note that some isolates may be susceptible to these agents while testing resistant to cefazolin.    -  Cefepime: S <=2    -  Cefoxitin: S <=8    -  Ceftriaxone: S <=1 Enterobacter, Klebsiella aerogenes, Citrobacter, and Serratia may develop resistance during prolonged therapy    -  Ciprofloxacin: S <=0.25    -  Ertapenem: S <=0.5    -  Gentamicin: S <=2    -  Imipenem: S <=1    -  Levofloxacin: S <=0.5    -  Meropenem: S <=1    -  Nitrofurantoin: S <=32 Should not be used to treat pyelonephritis    -  Piperacillin/Tazobactam: S <=8    -  Tigecycline: S <=2    -  Tobramycin: S <=2    -  Trimethoprim/Sulfamethoxazole: S <=0.5/9.5    Specimen Source: Clean Catch Clean Catch (Midstream)    Culture Results:   10,000 - 49,000 CFU/mL Klebsiella pneumoniae  >100,000 CFU/ml Gram positive organisms    Organism Identification: Klebsiella pneumoniae    Organism: Klebsiella pneumoniae    Method Type: [facility]    Culture - Blood (06.30.22 @ 21:45)    Specimen Source: .Blood Blood-Venous    Culture Results:   No growth to date.

## 2022-07-04 NOTE — PROGRESS NOTE ADULT - SUBJECTIVE AND OBJECTIVE BOX
Subjective:    Objective  Vital signs  T(F): , Max: 98.2 (07-03-22 @ 13:30)  HR: 94 (07-04-22 @ 05:27)  BP: 119/77 (07-04-22 @ 05:27)  SpO2: 97% (07-04-22 @ 05:27)    Output       Gen:  Resp:  Abd:  :    Labs    No AM labs      Cultures:  Culture - Urine (06.30.22 @ 22:44)    Specimen Source: Kidney Kidney    Culture Results:   Moderate Enterococcus faecalis    Culture - Blood (06.30.22 @ 21:45)    Specimen Source: .Blood Blood-Venous    Culture Results:   No growth to date.    Culture - Blood (06.30.22 @ 21:30)    Specimen Source: .Blood Blood-Peripheral    Culture Results:   No growth to date.    Culture - Urine (06.30.22 @ 15:12)    -  Amikacin: S <=16    -  Amoxicillin/Clavulanic Acid: S <=8/4    -  Ampicillin: R >16 These ampicillin results predict results for amoxicillin    -  Ampicillin: S <=2 Predicts results to ampicillin/sulbactam, amoxacillin-clavulanate and  piperacillin-tazobactam.    -  Ampicillin/Sulbactam: S <=4/2 Enterobacter, Klebsiella aerogenes, Citrobacter, and Serratia may develop resistance during prolonged therapy (3-4 days)    -  Aztreonam: S <=4    -  Cefazolin: S <=2 (MIC_CL_COM_ENTERIC_CEFAZU) For uncomplicated UTI with K. pneumoniae, E. coli, or P. mirablis: DREW <=16 is sensitive and DREW >=32 is resistant. This also predicts results for oral agents cefaclor, cefdinir, cefpodoxime, cefprozil, cefuroxime axetil, cephalexin and locarbef for uncomplicated UTI. Note that some isolates may be susceptible to these agents while testing resistant to cefazolin.    -  Cefepime: S <=2    -  Cefoxitin: S <=8    -  Ceftriaxone: S <=1 Enterobacter, Klebsiella aerogenes, Citrobacter, and Serratia may develop resistance during prolonged therapy    -  Ciprofloxacin: S <=0.25    -  Ciprofloxacin: S <=1    -  Ertapenem: S <=0.5    -  Gentamicin: S <=2    -  Imipenem: S <=1    -  Levofloxacin: S <=0.5    -  Levofloxacin: S <=1    -  Meropenem: S <=1    -  Nitrofurantoin: S <=32 Should not be used to treat pyelonephritis    -  Nitrofurantoin: S <=32 Should not be used to treat pyelonephritis.    -  Piperacillin/Tazobactam: S <=8    -  Tetra/Doxy: R >8    -  Tigecycline: S <=2    -  Tobramycin: S <=2    -  Trimethoprim/Sulfamethoxazole: S <=0.5/9.5    -  Vancomycin: S 2    Specimen Source: Clean Catch Clean Catch (Midstream)    Culture Results:   10,000 - 49,000 CFU/mL Klebsiella pneumoniae  >100,000 CFU/ml Enterococcus faecalis    Organism Identification: Enterococcus faecalis  Klebsiella pneumoniae    Organism: Enterococcus faecalis    Organism: Klebsiella pneumoniae    Method Type: DREW    Method Type: DREW     Subjective:  Resting comfortable. no acute complaints.     Objective  Vital signs  T(F): , Max: 98.2 (07-03-22 @ 13:30)  HR: 94 (07-04-22 @ 05:27)  BP: 119/77 (07-04-22 @ 05:27)  SpO2: 97% (07-04-22 @ 05:27)    Output       Gen: NAD  Resp: no respiratory distress  Abd: soft, nontender, nondistended. no rebound or guarding  : no suprapubic tenderness. nonpalp bladder    Labs    No AM labs      Cultures:  Culture - Urine (06.30.22 @ 22:44)    Specimen Source: Kidney Kidney    Culture Results:   Moderate Enterococcus faecalis    Culture - Blood (06.30.22 @ 21:45)    Specimen Source: .Blood Blood-Venous    Culture Results:   No growth to date.    Culture - Blood (06.30.22 @ 21:30)    Specimen Source: .Blood Blood-Peripheral    Culture Results:   No growth to date.    Culture - Urine (06.30.22 @ 15:12)    -  Amikacin: S <=16    -  Amoxicillin/Clavulanic Acid: S <=8/4    -  Ampicillin: R >16 These ampicillin results predict results for amoxicillin    -  Ampicillin: S <=2 Predicts results to ampicillin/sulbactam, amoxacillin-clavulanate and  piperacillin-tazobactam.    -  Ampicillin/Sulbactam: S <=4/2 Enterobacter, Klebsiella aerogenes, Citrobacter, and Serratia may develop resistance during prolonged therapy (3-4 days)    -  Aztreonam: S <=4    -  Cefazolin: S <=2 (MIC_CL_COM_ENTERIC_CEFAZU) For uncomplicated UTI with K. pneumoniae, E. coli, or P. mirablis: DREW <=16 is sensitive and DREW >=32 is resistant. This also predicts results for oral agents cefaclor, cefdinir, cefpodoxime, cefprozil, cefuroxime axetil, cephalexin and locarbef for uncomplicated UTI. Note that some isolates may be susceptible to these agents while testing resistant to cefazolin.    -  Cefepime: S <=2    -  Cefoxitin: S <=8    -  Ceftriaxone: S <=1 Enterobacter, Klebsiella aerogenes, Citrobacter, and Serratia may develop resistance during prolonged therapy    -  Ciprofloxacin: S <=0.25    -  Ciprofloxacin: S <=1    -  Ertapenem: S <=0.5    -  Gentamicin: S <=2    -  Imipenem: S <=1    -  Levofloxacin: S <=0.5    -  Levofloxacin: S <=1    -  Meropenem: S <=1    -  Nitrofurantoin: S <=32 Should not be used to treat pyelonephritis    -  Nitrofurantoin: S <=32 Should not be used to treat pyelonephritis.    -  Piperacillin/Tazobactam: S <=8    -  Tetra/Doxy: R >8    -  Tigecycline: S <=2    -  Tobramycin: S <=2    -  Trimethoprim/Sulfamethoxazole: S <=0.5/9.5    -  Vancomycin: S 2    Specimen Source: Clean Catch Clean Catch (Midstream)    Culture Results:   10,000 - 49,000 CFU/mL Klebsiella pneumoniae  >100,000 CFU/ml Enterococcus faecalis    Organism Identification: Enterococcus faecalis  Klebsiella pneumoniae    Organism: Enterococcus faecalis    Organism: Klebsiella pneumoniae    Method Type: DREW    Method Type: DREW

## 2022-07-04 NOTE — PROGRESS NOTE ADULT - ASSESSMENT
81 year old female s/p left ureteral stent placement for a ureteral stone and fevers    - speech and swallow eval for dysphagia  - f/u blood, urine, and kidney cultures  - analgesia as needed  - continue zosyn  - f/u medicine, wound care  - dc planning pending sensitivities   - OOB, DVT prophylaxis, incentive spirometry 81 year old female s/p left ureteral stent placement for a ureteral stone and fevers    - speech and swallow eval for dysphagia  - f/u blood, urine, and kidney cultures  - analgesia as needed  - continue zosyn  - f/u medicine, wound care, infectious disease  - dc planning pending sensitivities   - OOB, DVT prophylaxis, incentive spirometry

## 2022-07-04 NOTE — CONSULT NOTE ADULT - SUBJECTIVE AND OBJECTIVE BOX
HPI:   Patient is a 81y female with PMHx of HTN, chronic back and leg pain (wheelchair bound at baseline) presents to ED c/o vomiting x 2 days. Today patient had wound care appointment for pressure wound to her R calf and vomited again so was brought in by aide. Found to have a 5mm L distal stone on CT scan. Pt denies history of stones, no flank pain, dysuria, urgency, frequency or gross hematuria. On 6/30 the patient underwent a cysto procedure with left stent placement. Urine culture was sent and now reporting growing Klebsiella and Enterococcus in the urine culture. ID consulted for further antibiotics management     REVIEW OF SYSTEMS:  All other review of systems negative (Comprehensive ROS)    PAST MEDICAL & SURGICAL HISTORY:  Hypertension      Chronic back pain      Wheelchair bound      Left nephrolithiasis          Allergies    No Known Allergies    Intolerances            FAMILY HISTORY:      SOCIAL HISTORY:  Smoking:     ETOH:     Drug Use:     Single     T(F): 97.8 (07-04-22 @ 05:27), Max: 98.2 (07-03-22 @ 13:30)  HR: 94 (07-04-22 @ 05:27)  BP: 119/77 (07-04-22 @ 05:27)  RR: 18 (07-04-22 @ 05:27)  SpO2: 97% (07-04-22 @ 05:27)  Wt(kg): --    PHYSICAL EXAM:  General: alert, no acute distress  Eyes:  anicteric, no conjunctival injection, no discharge  Oropharynx: no lesions or injection 	  Neck: supple, without adenopathy  Lungs: clear to auscultation  Heart: regular rate and rhythm; no murmur, rubs or gallops  Abdomen: soft, nondistended, nontender, without mass or organomegaly  Skin: no lesions  Extremities: no clubbing, cyanosis, or edema  Neurologic: alert, oriented, moves all extremities    LAB RESULTS:                        13.5   13.28 )-----------( 392      ( 03 Jul 2022 09:01 )             41.1     07-03    133<L>  |  103  |  17  ----------------------------<  120<H>  3.8   |  16<L>  |  0.54    Ca    8.1<L>      03 Jul 2022 09:01            MICROBIOLOGY:  RECENT CULTURES:  06-30 @ 22:44 Kidney Kidney     Moderate Enterococcus faecalis      06-30 @ 21:45 .Blood Blood-Venous     No growth to date.      06-30 @ 21:30 .Blood Blood-Peripheral     No growth to date.      06-30 @ 15:12 Clean Catch Clean Catch (Midstream) Enterococcus faecalis  Klebsiella pneumoniae    10,000 - 49,000 CFU/mL Klebsiella pneumoniae  >100,000 CFU/ml Enterococcus faecalis            RADIOLOGY REVIEWED:      Antimicrobials Day #    piperacillin/tazobactam IVPB.. 3.375 Gram(s) IV Intermittent every 8 hours    Other Medications:  acetaminophen     Tablet .. 650 milliGRAM(s) Oral every 6 hours PRN  benzocaine 15 mG/menthol 3.6 mG Lozenge 1 Lozenge Oral three times a day PRN  dextrose 5% + sodium chloride 0.45%. 1000 milliLiter(s) IV Continuous <Continuous>  guaiFENesin  milliGRAM(s) Oral every 12 hours PRN  heparin   Injectable 5000 Unit(s) SubCutaneous every 8 hours  ondansetron Injectable 4 milliGRAM(s) IV Push every 6 hours PRN  polyethylene glycol 3350 17 Gram(s) Oral daily  senna 2 Tablet(s) Oral at bedtime  tamsulosin 0.4 milliGRAM(s) Oral daily

## 2022-07-04 NOTE — CONSULT NOTE ADULT - ASSESSMENT
81y female with PMHx of HTN, chronic back and leg pain (wheelchair bound at baseline) presents to ED c/o vomiting x 2 days. Today patient had wound care appointment for pressure wound to her R calf and vomited again so was brought in by aide. Found to have a 5mm L distal stone on CT scan. Pt denies history of stones, no flank pain, dysuria, urgency, frequency or gross hematuria. On 6/30 the patient underwent a cysto procedure with left stent placement. Urine culture was sent and now reporting growing Klebsiella and Enterococcus in the urine culture. ID consulted for further antibiotics management.  Reviewed blood cultures are reported to be no growth to date.     Plan   continue Zosyn IV q8 while she remains in the hospital. She can be changed to Levaquin 500 mg po daily when she is ready for DC planning to complete a ten days course  continue supportive care as per medicine and urology teams

## 2022-07-04 NOTE — PROGRESS NOTE ADULT - ATTENDING COMMENTS
Pt seen and examined with team  Agree with above eval and mgmt plan  continue zosyn  f/u cultures
As above  Awaiting culture results
pt seen and examined  doing very well- afebrile overnight  await cultures for dispo planning
Pt seen and examined  Agree with above eval and mgmt plan  IV antibiotics  hold off on po antibiotics until results of speech and swallow

## 2022-07-05 NOTE — SWALLOW BEDSIDE ASSESSMENT ADULT - SLP GENERAL OBSERVATIONS
Upon encounter, Pt is asleep in bed. Pt aroused to light tactile stimulation. Pt alert and able to answer questions for the purpose of this evaluation. Emesis noted in basin, clear and brown in color. Pt deferred PO trials 2/2 N/V. Upon encounter, Pt is asleep in bed. Pt aroused to light tactile stimulation. Pt alert and able to answer questions for the purpose of this evaluation. Emesis noted in basin, clear and brown in color. (mild-moderate in amount). Pt deferred PO trials at this time given reported nausea.

## 2022-07-05 NOTE — SWALLOW BEDSIDE ASSESSMENT ADULT - SLP PERTINENT HISTORY OF CURRENT PROBLEM
81 year old female with PMHx HTN, chronic back and leg pain (wheel chair bound at baseline) presents to ED c/o vomiting found to have a right distal stone, leukocytosis, and UTI. S/p left ureteral stent placement for a ureteral stone on 6/30/22. Wound Consult requested to assist w/ management of RLE wound, L heel DTI, and BLE PVD. Hypoglycemic episode 7/1/22 pt given 4 cups of apple juice and 3 glucose gels before pt could not tolerate anymore PO (vomited last apple juice), started on D51/2NS.  Cultures show Enterococci klebsiella-on zosyn. Dietician evaluation: Swallowing Evaluation ordered 7/3. Pt failed dysphagia screen in setting of N/V. 81 year old female with PMHx HTN, chronic back and leg pain (wheel chair bound at baseline) presents to ED c/o vomiting found to have a right distal stone, leukocytosis, and UTI. S/p left ureteral stent placement for a ureteral stone on 6/30/22. Wound Consult requested to assist w/ management of RLE wound, L heel DTI, and BLE PVD. Hypoglycemic episode 7/1/22 pt given 4 cups of apple juice and 3 glucose gels before pt could not tolerate anymore PO (vomited last apple juice), started on D51/2NS.  ID consulted 2/2 cultures show Enterococci klebsiella-on zosyn and management of antibiotics. Dietician evaluation: Swallowing Evaluation ordered 7/3. Pt failed dysphagia screen in setting of N/V. 81 year old female with PMHx HTN, chronic back and leg pain (wheel chair bound at baseline) presents to ED c/o vomiting found to have a right distal stone, leukocytosis, and UTI. S/p left ureteral stent placement for a ureteral stone on 6/30/22. Internal medicine consult for medical management; Cultures show Enterococci on Zosyn, Follow up sensitivities,  HTN Controlled off meds, rec wound care consult for chronic wounds. Wound Consult requested to assist w/ management of RLE wound, L heel DTI, and BLE PVD. Hypoglycemic episode 7/1/22 pt given 4 cups of apple juice and 3 glucose gels before pt could not tolerate anymore PO (vomited last apple juice), started on D51/2NS.  ID consulted 2/2 cultures show Enterococci klebsiella-on zosyn and management of antibiotics. Dietician evaluation: Swallowing Evaluation ordered 7/3. Pt failed dysphagia screen in setting of N/V.

## 2022-07-05 NOTE — CONSULT NOTE ADULT - SUBJECTIVE AND OBJECTIVE BOX
[facility]  Yannick Castillo PA-C  [facility] [place] [address]  [phone]      Chief Complaint:  Patient is a 81y old  Female who presents with a chief complaint of vomiting (05 Jul 2022 07:07)      HPI:81 year old female with PMHx of HTN, chronic back and leg pain (wheelchair bound at baseline) presents to ED c/o vomiting x 2 days. Today patient had wound care appointment for pressure wound to her R calf and vomited again so was brought in by aide. Found to have a 5mm L distal stone on CT scan. Pt denies history of stones, no flank pain, dysuria, urgency, frequency or gross hematuria.     Allergies:  No Known Allergies      Medications:  acetaminophen     Tablet .. 650 milliGRAM(s) Oral every 6 hours PRN  benzocaine 15 mG/menthol 3.6 mG Lozenge 1 Lozenge Oral three times a day PRN  dextrose 5% + sodium chloride 0.45%. 1000 milliLiter(s) IV Continuous <Continuous>  famotidine Injectable 20 milliGRAM(s) IV Push two times a day  guaiFENesin  milliGRAM(s) Oral every 12 hours PRN  heparin   Injectable 5000 Unit(s) SubCutaneous every 8 hours  metoclopramide 5 milliGRAM(s) Oral every 6 hours  ondansetron Injectable 4 milliGRAM(s) IV Push every 6 hours PRN  piperacillin/tazobactam IVPB.. 3.375 Gram(s) IV Intermittent every 8 hours  polyethylene glycol 3350 17 Gram(s) Oral daily  senna 2 Tablet(s) Oral at bedtime  sucralfate 1 Gram(s) Oral four times a day  tamsulosin 0.4 milliGRAM(s) Oral daily      PMHX/PSHX:  Hypertension    Chronic back pain    Wheelchair bound    Left nephrolithiasis        Family history:      Social History:     ROS:     General:  No wt loss, fevers, chills, night sweats, fatigue,   Eyes:  Good vision, no reported pain  ENT:  No sore throat, pain, runny nose, dysphagia  CV:  No pain, palpitations, hypo/hypertension  Resp:  No dyspnea, cough, tachypnea, wheezing  GI:  No pain, No nausea, No vomiting, No diarrhea, No constipation, No weight loss, No fever, No pruritis, No rectal bleeding, No tarry stools, No dysphagia,  :  No pain, bleeding, incontinence, nocturia  Muscle:  No pain, weakness  Neuro:  No weakness, tingling, memory problems  Psych:  No fatigue, insomnia, mood problems, depression  Endocrine:  No polyuria, polydipsia, cold/heat intolerance  Heme:  No petechiae, ecchymosis, easy bruisability  Skin:  No rash, tattoos, scars, edema      PHYSICAL EXAM:   Vital Signs:  Vital Signs Last 24 Hrs  T(C): 36.9 (05 Jul 2022 13:36), Max: 36.9 (05 Jul 2022 09:12)  T(F): 98.4 (05 Jul 2022 13:36), Max: 98.4 (05 Jul 2022 09:12)  HR: 92 (05 Jul 2022 13:36) (84 - 99)  BP: 147/84 (05 Jul 2022 13:36) (123/86 - 147/84)  BP(mean): --  RR: 18 (05 Jul 2022 13:36) (17 - 18)  SpO2: 97% (05 Jul 2022 13:36) (96% - 98%)  Daily     Daily     GENERAL:  Appears stated age,   HEENT:  NC/AT,    CHEST:  Full & symmetric excursion,   HEART:  Regular rhythm  ABDOMEN:  Soft, non-tender, non-distended,   EXTEREMITIES:  no cyanosis,clubbing or edema  SKIN:  No rash  NEURO:  Alert,    LABS:                    Imaging:

## 2022-07-05 NOTE — SWALLOW BEDSIDE ASSESSMENT ADULT - [facility]R SWALLOW REFERRAL
Consider GI consultation 2/2 c/o N/V Consider GI consultation due to unknown etiology of nausea/vomiting which is negatively impacting PO intake.

## 2022-07-05 NOTE — SWALLOW BEDSIDE ASSESSMENT ADULT - SWALLOW EVAL: DIAGNOSIS
Unable to assess oropharyngeal swallow function 2/2 Pt deferment of PO trials at this time due to N/V. Pt denies hx of and/or current difficulty swallowing. Defer diet to the team and consider GI consult 2/2 c/o N/V. Will return at later time/date as schedule allows. Unable to assess oropharyngeal swallow function 2/2 Pt deferment of PO trials at this time due to N/V. RN reports poor PO intake. Pt denies hx of and/or current difficulty swallowing. Defer diet to the team and consider GI consult 2/2 c/o N/V. Will return at later time/date as schedule allows. Unable to assess oropharyngeal swallow function 2/2 Pt deferment of PO trials at this time due to N/V. RN reports poor PO intake. Pt denies hx of and/or current difficulty swallowing. Dietary decision deferred to MD.  Consider GI consult due to unknown etiology of nausea and vomiting which is negatively impacting PO intake. . Will return at later time/date as schedule allows.

## 2022-07-05 NOTE — PROGRESS NOTE ADULT - ASSESSMENT
81 year old female s/p left ureteral stent placement for a ureteral stone and fevers + klebsiella and enterococcus    - speech and swallow eval for dysphagia  - f/u blood, urine, and kidney cultures  - analgesia as needed  - continue zosyn  - f/u medicine, wound care, infectious disease  - dc planning pending sensitivities   - OOB, DVT prophylaxis, incentive spirometry

## 2022-07-05 NOTE — PROGRESS NOTE ADULT - SUBJECTIVE AND OBJECTIVE BOX
Subjective  states had a horrible night; was relatively unchanged from prev. cont with spit up  Objective    Vital signs  T(F): , Max: 98.5 (07-04-22 @ 13:20)  HR: 92 (07-05-22 @ 05:09)  BP: 123/86 (07-05-22 @ 05:09)  SpO2: 96% (07-05-22 @ 05:09)  Wt(kg): --    Output     07-04 @ 07:01  -  07-05 @ 07:00  --------------------------------------------------------  IN: 2600 mL / OUT: 0 mL / NET: 2600 mL        Gen awake alert nad axox3 frail nontoxic appearing   Abd flat soft ntnd   Back no cvat bl    nonpalp bladder no suprapubic tenderness     Labs      07-03 @ 09:01    WBC 13.28 / Hct 41.1  / SCr 0.54       Urine Cx: Culture - Urine (06.30.22 @ 22:44)    Specimen Source: Kidney Kidney    Culture Results:   Moderate Enterococcus faecalis    Culture - Blood (06.30.22 @ 21:45)    Specimen Source: .Blood Blood-Venous    Culture Results:   No growth to date.    Culture - Blood (06.30.22 @ 21:45)    Specimen Source: .Blood Blood-Venous    Culture Results:   No growth to date.    Imaging  < from: CT Abdomen and Pelvis w/ IV Cont (06.30.22 @ 13:32) >  FINDINGS:  LOWER CHEST: Smallbilateral pleural effusions. Mitral annular   calcification.    LIVER: Within normal limits.  BILE DUCTS: Normal caliber.  GALLBLADDER: Within normal limits.  SPLEEN: Within normal limits.  PANCREAS: Within normal limits.  ADRENALS: Within normal limits.  KIDNEYS/URETERS: Mild left hydroureteronephrosis secondary to a 5 mm   calculus in the distal ureter. Urothelial enhancement of the left renal   pelvis and ureter. Left renal cortical scarring. Small renal parapelvic   and cortical cysts and subcentimeter hypodensities too small to   characterize.    BLADDER: Urothelial enhancement and subtle perivesicular fat stranding.  REPRODUCTIVE ORGANS: Small calcified exophytic fibroid. Endometrial   thickness measuring 8 mm.    BOWEL: Small hiatal hernia. No bowel obstruction. Appendix not   visualized. Rectum markedly distended with stool.  PERITONEUM: No ascites.  VESSELS: Atherosclerotic changes.  RETROPERITONEUM/LYMPH NODES: No lymphadenopathy.  ABDOMINAL WALL: Within normal limits.  BONES: Degenerative changes.    IMPRESSION:  Mild left hydroureteronephrosis secondary to a 5 mm calculus in the   distal ureter. Diffuse urothelial enhancement of the left renal pelvis   and ureter as well as the bladder with subtle perivesicular fat   stranding, concerning for urinary tract infection.    Rectum markedly distended with stool.    Prominent endometrial thickness measuring 8 mm. Pelvic ultrasound can be   performed for further evaluation.    Small bilateral pleural effusions.    < end of copied text >

## 2022-07-05 NOTE — CONSULT NOTE ADULT - ASSESSMENT
nausea/vomiting    cont diet as tolerated  start reglan 5mg  q6h  cont proton pump inhibitor  cont carafate  will follow

## 2022-07-06 NOTE — PROGRESS NOTE ADULT - ASSESSMENT
81 year old female s/p left ureteral stent placement for a ureteral stone and fevers + klebsiella and enterococcus    - Medicine transfer today  - speech and swallow evaluated - recommended GI consult  - f/u blood, urine, and kidney cultures  - analgesia as needed  - continue zosyn  - f/u medicine, wound care, infectious disease, GI recommendations  - 10 days of augmentin upon discharge  - OOB, DVT prophylaxis, incentive spirometry

## 2022-07-06 NOTE — PROGRESS NOTE ADULT - SUBJECTIVE AND OBJECTIVE BOX
The patient was seen and examined at bedside.  No acute events overnight and the patient is without acute complaints this AM.    T(C): 36.5 (07-06-22 @ 05:02), Max: 36.9 (07-05-22 @ 09:12)  HR: 102 (07-06-22 @ 05:02) (84 - 102)  BP: 117/74 (07-06-22 @ 05:02) (113/83 - 147/84)  RR: 18 (07-06-22 @ 05:02) (18 - 18)  SpO2: 97% (07-06-22 @ 05:02) (97% - 100%)  Wt(kg): --    Physical Exam:    General: NAD, A+Ox3  Abdomen: soft, non-tender, non-distended        07-05 @ 07:01  -  07-06 @ 07:00  --------------------------------------------------------  IN: 1300 mL / OUT: 0 mL / NET: 1300 mL      Void - twice, unrecorded

## 2022-07-06 NOTE — DISCHARGE NOTE NURSING/CASE MANAGEMENT/SOCIAL WORK - NSDCPEFALRISK_GEN_ALL_CORE
For information on Fall & Injury Prevention, visit: [email or web address] OR  [email or web address] OR  https://www.cdc.gov/steadi/patient.html

## 2022-07-06 NOTE — PROGRESS NOTE ADULT - SUBJECTIVE AND OBJECTIVE BOX
[facility]  Yannick Castillo PA-C  [facility] [place] [address] [address] [phone]      INTERVAL HPI/OVERNIGHT EVENTS:  pt seen and examined, no new events  states nausea is improving, no vomiting since last night  hasn't eaten much food, poor appetite     MEDICATIONS  (STANDING):  dextrose 5% + sodium chloride 0.45%. 1000 milliLiter(s) (100 mL/Hr) IV Continuous <Continuous>  famotidine Injectable 20 milliGRAM(s) IV Push two times a day  heparin   Injectable 5000 Unit(s) SubCutaneous every 8 hours  metoclopramide 5 milliGRAM(s) Oral every 6 hours  piperacillin/tazobactam IVPB.. 3.375 Gram(s) IV Intermittent every 8 hours  polyethylene glycol 3350 17 Gram(s) Oral daily  senna 2 Tablet(s) Oral at bedtime  sucralfate 1 Gram(s) Oral four times a day  tamsulosin 0.4 milliGRAM(s) Oral daily    MEDICATIONS  (PRN):  acetaminophen     Tablet .. 650 milliGRAM(s) Oral every 6 hours PRN Temp greater or equal to 38C (100.4F), Mild Pain (1 - 3)  benzocaine 15 mG/menthol 3.6 mG Lozenge 1 Lozenge Oral three times a day PRN Sore Throat  guaiFENesin  milliGRAM(s) Oral every 12 hours PRN Cough  ondansetron Injectable 4 milliGRAM(s) IV Push every 6 hours PRN Nausea and/or Vomiting      Allergies    No Known Allergies    Intolerances        ROS:   General:  No wt loss, fevers, chills, night sweats, fatigue,   Eyes:  Good vision, no reported pain  ENT:  No sore throat, pain, runny nose, dysphagia  CV:  No pain, palpitations, hypo/hypertension  Resp:  No dyspnea, cough, tachypnea, wheezing  GI:  No pain, + nausea, + vomiting, No diarrhea, No constipation, No weight loss, No fever, No pruritis, No rectal bleeding, No tarry stools, No dysphagia,  :  No pain, bleeding, incontinence, nocturia  Muscle:  No pain, weakness  Neuro:  No weakness, tingling, memory problems  Psych:  No fatigue, insomnia, mood problems, depression  Endocrine:  No polyuria, polydipsia, cold/heat intolerance  Heme:  No petechiae, ecchymosis, easy bruisability  Skin:  No rash, tattoos, scars, edema      PHYSICAL EXAM:   Vital Signs:  Vital Signs Last 24 Hrs  T(C): 36.5 (06 Jul 2022 05:02), Max: 36.9 (05 Jul 2022 13:36)  T(F): 97.7 (06 Jul 2022 05:02), Max: 98.4 (05 Jul 2022 13:36)  HR: 102 (06 Jul 2022 05:02) (92 - 102)  BP: 117/74 (06 Jul 2022 05:02) (113/83 - 147/84)  BP(mean): --  RR: 18 (06 Jul 2022 05:02) (18 - 18)  SpO2: 97% (06 Jul 2022 05:02) (97% - 100%)  Daily     Daily     GENERAL:  Appears stated age,   HEENT:  NC/AT,    CHEST:  Full & symmetric excursion,   HEART:  Regular rhythm,  ABDOMEN:  Soft, non-tender, non-distended,  EXTEREMITIES:  no cyanosis  SKIN:  No rash  NEURO:  Alert,       LABS:                RADIOLOGY & ADDITIONAL TESTS:

## 2022-07-06 NOTE — SWALLOW BEDSIDE ASSESSMENT ADULT - SWALLOW EVAL: RECOMMENDED DIET
Defer diet consistency to team given limited evaluation Pt is able to tolerate thin liquids, however, defer diet consistency to team given limited evaluation

## 2022-07-06 NOTE — DISCHARGE NOTE NURSING/CASE MANAGEMENT/SOCIAL WORK - PATIENT PORTAL LINK FT
You can access the FollowMyHealth Patient Portal offered by [facility] by registering at the following website: [email or web address]. By joining [facility]’s FollowMyHealth portal, you will also be able to view your health information using other applications (apps) compatible with our system.

## 2022-07-06 NOTE — SWALLOW BEDSIDE ASSESSMENT ADULT - SLP GENERAL OBSERVATIONS
Encountered Pt laying flat in bed. Pt reports reduced nausea and no vomiting today. Pt awake and able to follow commands for the purpose of this evaluation. SLP provided encouragement for Pt to participate in this evaluation. Pt repositioned upright for PO trials.

## 2022-07-06 NOTE — SWALLOW BEDSIDE ASSESSMENT ADULT - SWALLOW EVAL: DIAGNOSIS
Pt presents w/ a grossly functional oral/pharyngeal swallow w/ thin liquids. Mildly delayed onset of pharyngeal swallow noted on palpation, however, no overt s/s of penetration/aspiration. Audible swallow x2. Limited evaluation 2/2 Pt deferment of solid trials. Better Pt participation today, however, limited evaluation 2/2 Pt deferment of solid trials despite reports of reduced nausea and vomiting. Pt accepted small amounts of thin liquids. Pt presents w/ a suspected pharyngeal dysphagia characterized by suspected anterior spillage to oropharynx and mildly delayed onset of pharyngeal swallow noted on palpation. Audible swallow x2. No overt s/s of penetration/aspiration. This service will follow up to assess tolerance of solids pending Pt participation.

## 2022-07-06 NOTE — PROGRESS NOTE ADULT - ASSESSMENT
81y female with PMHx of HTN, chronic back and leg pain (wheelchair bound at baseline) presents to ED c/o vomiting x 2 days. Today patient had wound care appointment for pressure wound to her R calf and vomited again so was brought in by aide. Found to have a 5mm L distal stone on CT scan. Pt denies history of stones, no flank pain, dysuria, urgency, frequency or gross hematuria. On 6/30 the patient underwent a cysto procedure with left stent placement. Urine culture was sent and now reporting growing Klebsiella and Enterococcus in the urine culture. ID consulted for further antibiotics management.  Reviewed blood cultures are reported to be no growth to date.   She appears stable from ID viewpoint.  Today is day 6 of zosyn.  Plan   continue Zosyn IV q8 while she remains in the hospital. She can be changed to Levaquin 500 mg po daily when she is ready for DC planning to complete a ten days course  continue supportive care as per medicine and urology teams  4 additional days of antibiotics, discharge planning per other services

## 2022-07-06 NOTE — SWALLOW BEDSIDE ASSESSMENT ADULT - SLP PERTINENT HISTORY OF CURRENT PROBLEM
81 year old female with PMHx HTN, chronic back and leg pain (wheel chair bound at baseline) presents to ED c/o vomiting found to have a right distal stone, leukocytosis, and UTI. S/p left ureteral stent placement for a ureteral stone on 6/30/22. Internal medicine consult for medical management; Cultures show Enterococci on Zosyn, Follow up sensitivities,  HTN Controlled off meds, rec wound care consult for chronic wounds. Wound Consult requested to assist w/ management of RLE wound, L heel DTI, and BLE PVD. Hypoglycemic episode 7/1/22 pt given 4 cups of apple juice and 3 glucose gels before pt could not tolerate anymore PO (vomited last apple juice), started on D51/2NS.  ID consulted 2/2 cultures show Enterococci klebsiella-on zosyn and management of antibiotics. Dietician evaluation: Swallowing Evaluation ordered 7/3. Pt failed dysphagia screen in setting of N/V. Limited swallow evaluation completed 7/6/22 due to Pt deferment of PO trials due to nausea/vomiting.

## 2022-07-06 NOTE — PROGRESS NOTE ADULT - ASSESSMENT
nausea/vomiting    cont diet as tolerated  cont with reglan 5mg  q6h  cont proton pump inhibitor  cont carafate  will follow

## 2022-07-06 NOTE — PROGRESS NOTE ADULT - ASSESSMENT
81 year old female with PMHx of HTN, chronic back and leg pain (wheelchair bound at baseline) presents to ED c/o vomiting x 2 days.   Found to have a 5mm L distal stone on CT scan. Pt denies history of stones, no flank pain, dysuria, urgency, frequency or gross hematuria.      Nausea vomiting   Dysphagia     GI consulted   Recommend Reglan Zofran prn   Carafate  UGI series       Nephrolithiasis s/p stent placement   Patient afebrile   Cultures shows Emterococci om Zosyn changed to   ID consulted   Urology following     HTN Controlled off meds     Chronic wounds wound care consult appreciated

## 2022-07-06 NOTE — PROGRESS NOTE ADULT - SUBJECTIVE AND OBJECTIVE BOX
CC: f/u for UTI     Patient reports: she is sleepy but offers no complaints.Her n/v is better     REVIEW OF SYSTEMS:  All other review of systems negative (Comprehensive ROS)    Antimicrobials Day #  :day 6  piperacillin/tazobactam IVPB.. 3.375 Gram(s) IV Intermittent every 8 hours    Other Medications Reviewed    T(F): 97.6 (07-06-22 @ 09:58), Max: 98.4 (07-05-22 @ 13:36)  HR: 99 (07-06-22 @ 09:58)  BP: 113/89 (07-06-22 @ 09:58)  RR: 18 (07-06-22 @ 09:58)  SpO2: 97% (07-06-22 @ 09:58)  Wt(kg): --    PHYSICAL EXAM:  General: alert, no acute distress  Eyes:  anicteric, no conjunctival injection, no discharge  Oropharynx: no lesions or injection 	  Neck: supple, without adenopathy  Lungs: clear to auscultation  Heart: regular rate and rhythm; no murmur, rubs or gallops  Abdomen: soft, nondistended, nontender, without mass or organomegaly  Skin: no lesions  Extremities: no clubbing, cyanosis, or edema  Neurologic: alert, oriented, moves all extremities    LAB RESULTS:              MICROBIOLOGY:  RECENT CULTURES:      RADIOLOGY REVIEWED:

## 2022-07-07 NOTE — PROGRESS NOTE ADULT - ASSESSMENT
nausea/vomiting    cont diet as tolerated  cont with reglan 5mg  q6h  cont proton pump inhibitor  cont carafate  diet as tolerated  will follow

## 2022-07-07 NOTE — PROGRESS NOTE ADULT - SUBJECTIVE AND OBJECTIVE BOX
[facility]  Yannick Castillo PA-C  [address] [place] [address]  [phone]      INTERVAL HPI/OVERNIGHT EVENTS:  pt seen and examined, no new events  Nausea improving, still not much of an appetite     MEDICATIONS  (STANDING):  dextrose 5% + sodium chloride 0.45%. 1000 milliLiter(s) (100 mL/Hr) IV Continuous <Continuous>  famotidine Injectable 20 milliGRAM(s) IV Push two times a day  heparin   Injectable 5000 Unit(s) SubCutaneous every 8 hours  metoclopramide 5 milliGRAM(s) Oral every 6 hours  piperacillin/tazobactam IVPB.. 3.375 Gram(s) IV Intermittent every 8 hours  polyethylene glycol 3350 17 Gram(s) Oral daily  senna 2 Tablet(s) Oral at bedtime  sucralfate 1 Gram(s) Oral four times a day  tamsulosin 0.4 milliGRAM(s) Oral daily    MEDICATIONS  (PRN):  acetaminophen     Tablet .. 650 milliGRAM(s) Oral every 6 hours PRN Temp greater or equal to 38C (100.4F), Mild Pain (1 - 3)  benzocaine 15 mG/menthol 3.6 mG Lozenge 1 Lozenge Oral three times a day PRN Sore Throat  guaiFENesin  milliGRAM(s) Oral every 12 hours PRN Cough  ondansetron Injectable 4 milliGRAM(s) IV Push every 6 hours PRN Nausea and/or Vomiting      Allergies    No Known Allergies    Intolerances        ROS:   General:  No wt loss, fevers, chills, night sweats, fatigue,   Eyes:  Good vision, no reported pain  ENT:  No sore throat, pain, runny nose, dysphagia  CV:  No pain, palpitations, hypo/hypertension  Resp:  No dyspnea, cough, tachypnea, wheezing  GI:  No pain, No nausea, No vomiting, No diarrhea, No constipation, No weight loss, No fever, No pruritis, No rectal bleeding, No tarry stools, No dysphagia,  :  No pain, bleeding, incontinence, nocturia  Muscle:  No pain, weakness  Neuro:  No weakness, tingling, memory problems  Psych:  No fatigue, insomnia, mood problems, depression  Endocrine:  No polyuria, polydipsia, cold/heat intolerance  Heme:  No petechiae, ecchymosis, easy bruisability  Skin:  No rash, tattoos, scars, edema      PHYSICAL EXAM:   Vital Signs Last 24 Hrs  T(C): 36.4 (07 Jul 2022 10:36), Max: 36.8 (06 Jul 2022 21:04)  T(F): 97.5 (07 Jul 2022 10:36), Max: 98.2 (06 Jul 2022 21:04)  HR: 82 (07 Jul 2022 10:36) (82 - 97)  BP: 138/83 (07 Jul 2022 10:36) (114/82 - 138/83)  BP(mean): --  RR: 18 (07 Jul 2022 10:36) (18 - 18)  SpO2: 98% (07 Jul 2022 10:36) (97% - 98%)  Daily     Daily     GENERAL:  Appears stated age,   HEENT:  NC/AT,    CHEST:  Full & symmetric excursion,   HEART:  Regular rhythm,  ABDOMEN:  Soft, non-tender, non-distended,  EXTEREMITIES:  no cyanosis  SKIN:  No rash  NEURO:  Alert,       LABS:                RADIOLOGY & ADDITIONAL TESTS:

## 2022-07-07 NOTE — PROGRESS NOTE ADULT - ASSESSMENT
81 year old female with PMHx of HTN, chronic back and leg pain (wheelchair bound at baseline) presents to ED c/o vomiting x 2 days.   Found to have a 5mm L distal stone on CT scan. Pt denies history of stones, no flank pain, dysuria, urgency, frequency or gross hematuria.      Nausea vomiting   Dysphagia     GI consulted   Recommend Reglan Zofran prn   Carafate  d/c planning        Nephrolithiasis s/p stent placement   Patient afebrile   Cultures shows Emterococci om Zosyn change to Levaquin on discharge   ID consulted   Urology following     HTN Controlled off meds     Chronic wounds wound care consult appreciated

## 2022-07-07 NOTE — PROGRESS NOTE ADULT - SUBJECTIVE AND OBJECTIVE BOX
HPI:  81 year old female with PMHx of HTN, chronic back and leg pain (wheelchair bound at baseline) presents to ED c/o vomiting x 2 days. Today patient had wound care appointment for pressure wound to her R calf and vomited again so was brought in by aide. Found to have a 5mm L distal stone on CT scan. Pt denies history of stones, no flank pain, dysuria, urgency, frequency or gross hematuria.  (2022 17:15)      PAST MEDICAL & SURGICAL HISTORY:  Hypertension      Chronic back pain      Wheelchair bound      Left nephrolithiasis          Review of Systems:   CONSTITUTIONAL: No fever, weight loss, or fatigue  EYES: No eye pain, visual disturbances, or discharge  ENMT:  No difficulty hearing, tinnitus, vertigo; No sinus or throat pain  NECK: No pain or stiffness  BREASTS: No pain, masses, or nipple discharge  RESPIRATORY: No cough, wheezing, chills or hemoptysis; No shortness of breath  CARDIOVASCULAR: No chest pain, palpitations, dizziness, or leg swelling  GASTROINTESTINAL: No abdominal or epigastric pain. No nausea, vomiting, or hematemesis; No diarrhea or constipation. No melena or hematochezia.  GENITOURINARY: No dysuria, frequency, hematuria, or incontinence  NEUROLOGICAL: No headaches, memory loss, loss of strength, numbness, or tremors  SKIN: No itching, burning, rashes, or lesions   LYMPH NODES: No enlarged glands  ENDOCRINE: No heat or cold intolerance; No hair loss  MUSCULOSKELETAL: No joint pain or swelling; No muscle, back, or extremity pain  PSYCHIATRIC: No depression, anxiety, mood swings, or difficulty sleeping  HEME/LYMPH: No easy bruising, or bleeding gums  ALLERY AND IMMUNOLOGIC: No hives or eczema    Allergies    No Known Allergies    Intolerances        Social History:     FAMILY HISTORY:      T(C): 36.7 (22 @ 21:46), Max: 36.7 (22 @ 21:46)  HR: 104 (22 @ 21:46) (82 - 104)  BP: 133/77 (22 @ 21:46) (133/77 - 140/83)  RR: 18 (22 @ 21:46) (18 - 18)  SpO2: 98% (22 @ 21:46) (98% - 99%)    MEDICATIONS  (STANDING):  dextrose 5% + sodium chloride 0.45%. 1000 milliLiter(s) (100 mL/Hr) IV Continuous <Continuous>  famotidine Injectable 20 milliGRAM(s) IV Push two times a day  heparin   Injectable 5000 Unit(s) SubCutaneous every 8 hours  metoclopramide 5 milliGRAM(s) Oral every 6 hours  piperacillin/tazobactam IVPB.. 3.375 Gram(s) IV Intermittent every 8 hours  polyethylene glycol 3350 17 Gram(s) Oral daily  senna 2 Tablet(s) Oral at bedtime  sucralfate 1 Gram(s) Oral four times a day  tamsulosin 0.4 milliGRAM(s) Oral daily    MEDICATIONS  (PRN):  acetaminophen     Tablet .. 650 milliGRAM(s) Oral every 6 hours PRN Temp greater or equal to 38C (100.4F), Mild Pain (1 - 3)  benzocaine 15 mG/menthol 3.6 mG Lozenge 1 Lozenge Oral three times a day PRN Sore Throat  guaiFENesin  milliGRAM(s) Oral every 12 hours PRN Cough  ondansetron Injectable 4 milliGRAM(s) IV Push every 6 hours PRN Nausea and/or Vomiting        PHYSICAL EXAM:  GENERAL: NAD, well-developed  HEAD:  Atraumatic, Normocephalic  EYES: EOMI, PERRLA, conjunctiva and sclera clear  NECK: Supple, No JVD  CHEST/LUNG: Clear to auscultation bilaterally; No wheeze  HEART: Regular rate and rhythm; No murmurs, rubs, or gallops  ABDOMEN: Soft, Nontender, Nondistended; Bowel sounds present  EXTREMITIES:  2+ Peripheral Pulses, No clubbing, cyanosis, or edema  PSYCH: AAOx3  NEUROLOGY: non-focal  SKIN: No rashes or lesions      Color: Yellow / Appearance: Turbid / S.020 / pH: x  Gluc: x / Ketone: Trace  / Bili: Negative / Urobili: Negative   Blood: x / Protein: 30 mg/dL / Nitrite: Negative   Leuk Esterase: Large / RBC: 1 /hpf / WBC >50   Sq Epi: x / Non Sq Epi: 3 /hpf / Bacteria: Many    CAPILLARY BLOOD GLUCOSE          RADIOLOGY & ADDITIONAL TESTS:    Imaging Personally Reviewed:    Consultant(s) Notes Reviewed:      Care Discussed with Consultants/Other Providers:

## 2022-07-07 NOTE — PROGRESS NOTE ADULT - SUBJECTIVE AND OBJECTIVE BOX
CC: f/u for UTI     Patient reports she is feeling better today     REVIEW OF SYSTEMS:  All other review of systems negative (Comprehensive ROS)    Antimicrobials Day #  :day 7  piperacillin/tazobactam IVPB.. 3.375 Gram(s) IV Intermittent every 8 hours    Other Medications Reviewed    Vital Signs Last 24 Hrs  T(C): 36.4 (07 Jul 2022 01:25), Max: 36.8 (06 Jul 2022 21:04)  T(F): 97.5 (07 Jul 2022 01:25), Max: 98.2 (06 Jul 2022 21:04)  HR: 88 (07 Jul 2022 01:25) (87 - 97)  BP: 114/82 (07 Jul 2022 01:25) (114/82 - 130/85)  BP(mean): --  RR: 18 (07 Jul 2022 01:25) (18 - 18)  SpO2: 98% (07 Jul 2022 01:25) (97% - 98%)    PHYSICAL EXAM:  General: alert, no acute distress  Eyes:  anicteric, no conjunctival injection, no discharge  Oropharynx: no lesions or injection 	  Neck: supple, without adenopathy  Lungs: clear to auscultation  Heart: regular rate and rhythm; no murmur, rubs or gallops  Abdomen: soft, nondistended, nontender, without mass or organomegaly  Skin: no lesions  Extremities: no clubbing, cyanosis, or edema  Neurologic: alert, oriented, moves all extremities    LAB RESULTS:    no new cbc for today            MICROBIOLOGY:  RECENT CULTURES:      RADIOLOGY REVIEWED:

## 2022-07-07 NOTE — PROGRESS NOTE ADULT - SUBJECTIVE AND OBJECTIVE BOX
Interval events:   Transferred to medicine   Afebrile overnight   Kidney clx: E faecalis sensitive to ampicilin/augmentin   Blood: neg final     SUBJECTIVE:  No acute complaints       OBJECTIVE:  Vital Signs Last 24 Hrs  T(C): 36.4 (07 Jul 2022 01:25), Max: 36.8 (06 Jul 2022 21:04)  T(F): 97.5 (07 Jul 2022 01:25), Max: 98.2 (06 Jul 2022 21:04)  HR: 88 (07 Jul 2022 01:25) (87 - 99)  BP: 114/82 (07 Jul 2022 01:25) (113/89 - 130/85)  BP(mean): --  RR: 18 (07 Jul 2022 01:25) (18 - 18)  SpO2: 98% (07 Jul 2022 01:25) (97% - 98%)    Physical Examination:  GEN: NAD, resting quietly  PULM: symmetric chest rise bilaterally, no increased WOB  ABD: soft      LABS:

## 2022-07-07 NOTE — PROGRESS NOTE ADULT - ASSESSMENT
81y female with PMHx of HTN, chronic back and leg pain (wheelchair bound at baseline) presents to ED c/o vomiting x 2 days. Today patient had wound care appointment for pressure wound to her R calf and vomited again so was brought in by aide. Found to have a 5mm L distal stone on CT scan. Pt denies history of stones, no flank pain, dysuria, urgency, frequency or gross hematuria. On 6/30 the patient underwent a cysto procedure with left stent placement. Urine culture was sent and now reporting growing Klebsiella and Enterococcus in the urine culture. ID consulted for further antibiotics management.  Reviewed blood cultures are reported to be no growth to date.   She appears stable from ID viewpoint.  The patient reports she is feeling better today  Today is day 7 of zosyn.  Plan   continue Zosyn IV q8 while she remains in the hospital. She can be changed to Levaquin 500 mg po daily when she is ready for DC planning to complete a ten days course  ordered CBC and Basic Chem for AM labs   continue supportive care as per medicine and urology teams  3 additional days of antibiotics, discharge planning per other services

## 2022-07-07 NOTE — PROGRESS NOTE ADULT - ASSESSMENT
81 year old female s/p left ureteral stent placement for a ureteral stone and fevers + klebsiella and enterococcus    - blood clx: neg final   - Kidney clx: E faecalis (sensitive to ampicilin/zosyn/augmentin)   - analgesia as needed  - 10 days of augmentin upon discharge  - OOB, DVT prophylaxis, incentive spirometry  - Remainder of care and dispo per primary

## 2022-07-08 NOTE — PROGRESS NOTE ADULT - SUBJECTIVE AND OBJECTIVE BOX
CC: f/u for UTI     Patient reports she is feeling better today     REVIEW OF SYSTEMS:  All other review of systems negative (Comprehensive ROS)    Antimicrobials Day #  :day 8  piperacillin/tazobactam IVPB.. 3.375 Gram(s) IV Intermittent every 8 hours    Other Medications Reviewed    Vital Signs Last 24 Hrs  T(C): 36.6 (08 Jul 2022 05:01), Max: 36.7 (07 Jul 2022 21:46)  T(F): 97.9 (08 Jul 2022 05:01), Max: 98.1 (07 Jul 2022 21:46)  HR: 98 (08 Jul 2022 05:01) (82 - 104)  BP: 125/83 (08 Jul 2022 05:01) (123/65 - 140/83)  BP(mean): --  RR: 18 (08 Jul 2022 05:01) (18 - 18)  SpO2: 98% (08 Jul 2022 05:01) (97% - 99%)    Parameters below as of 08 Jul 2022 05:01  Patient On (Oxygen Delivery Method): room air        PHYSICAL EXAM:  General: alert, no acute distress  Eyes:  anicteric, no conjunctival injection, no discharge  Oropharynx: no lesions or injection 	  Neck: supple, without adenopathy  Lungs: clear to auscultation  Heart: regular rate and rhythm; no murmur, rubs or gallops  Abdomen: soft, nondistended, nontender, without mass or organomegaly  Skin: no lesions  Extremities: no clubbing, cyanosis, or edema  Neurologic: alert, oriented, moves all extremities    LAB RESULTS:                MICROBIOLOGY:  RECENT CULTURES:      RADIOLOGY REVIEWED:

## 2022-07-08 NOTE — PROGRESS NOTE ADULT - ASSESSMENT
04/15/19 2100   Discharge disposition   Expected discharge disposition Home or Self   Home services after discharge DME   E provider [id number]   Discharge transportation Private car     Patient discharged on 4/15/19 as previously planned. nausea/vomiting    cont diet as tolerated  cont with reglan 5mg  q6h  cont proton pump inhibitor  cont carafate  diet as tolerated  will follow

## 2022-07-08 NOTE — PROGRESS NOTE ADULT - ASSESSMENT
81y female with PMHx of HTN, chronic back and leg pain (wheelchair bound at baseline) presents to ED c/o vomiting x 2 days. Today patient had wound care appointment for pressure wound to her R calf and vomited again so was brought in by aide. Found to have a 5mm L distal stone on CT scan. Pt denies history of stones, no flank pain, dysuria, urgency, frequency or gross hematuria. On 6/30 the patient underwent a cysto procedure with left stent placement. Urine culture was sent and now reporting growing Klebsiella and Enterococcus in the urine culture. ID consulted for further antibiotics management.  Reviewed blood cultures are reported to be no growth to date.   She appears stable from ID viewpoint.  The patient reports she is feeling better   Today is day 8 of zosyn.    Plan   continue Zosyn IV q8 while she remains in the hospital.  complete a ten days course, oral options are available, continue antibiotics for two more days as planned  she is tolerating antibiotics   continue supportive care as per medicine and urology teams

## 2022-07-08 NOTE — PROGRESS NOTE ADULT - SUBJECTIVE AND OBJECTIVE BOX
HPI:  81 year old female with PMHx of HTN, chronic back and leg pain (wheelchair bound at baseline) presents to ED c/o vomiting x 2 days. Today patient had wound care appointment for pressure wound to her R calf and vomited again so was brought in by aide. Found to have a 5mm L distal stone on CT scan. Pt denies history of stones, no flank pain, dysuria, urgency, frequency or gross hematuria.  (30 Jun 2022 17:15)      PAST MEDICAL & SURGICAL HISTORY:  Hypertension      Chronic back pain      Wheelchair bound      Left nephrolithiasis          Review of Systems:   CONSTITUTIONAL: No fever, weight loss, or fatigue  EYES: No eye pain, visual disturbances, or discharge  ENMT:  No difficulty hearing, tinnitus, vertigo; No sinus or throat pain  NECK: No pain or stiffness  BREASTS: No pain, masses, or nipple discharge  RESPIRATORY: No cough, wheezing, chills or hemoptysis; No shortness of breath  CARDIOVASCULAR: No chest pain, palpitations, dizziness, or leg swelling  GASTROINTESTINAL: No abdominal or epigastric pain. No nausea, vomiting, or hematemesis; No diarrhea or constipation. No melena or hematochezia.  GENITOURINARY: No dysuria, frequency, hematuria, or incontinence  NEUROLOGICAL: No headaches, memory loss, loss of strength, numbness, or tremors  SKIN: No itching, burning, rashes, or lesions   LYMPH NODES: No enlarged glands  ENDOCRINE: No heat or cold intolerance; No hair loss  MUSCULOSKELETAL: No joint pain or swelling; No muscle, back, or extremity pain  PSYCHIATRIC: No depression, anxiety, mood swings, or difficulty sleeping  HEME/LYMPH: No easy bruising, or bleeding gums  ALLERY AND IMMUNOLOGIC: No hives or eczema    Allergies    No Known Allergies    Intolerances        Social History:     FAMILY HISTORY:      T(C): 36.7 (07-07-22 @ 21:46), Max: 36.7 (07-07-22 @ 21:46)  HR: 104 (07-07-22 @ 21:46) (82 - 104)  BP: 133/77 (07-07-22 @ 21:46) (133/77 - 140/83)  RR: 18 (07-07-22 @ 21:46) (18 - 18)  SpO2: 98% (07-07-22 @ 21:46) (98% - 99%)    MEDICATIONS  (STANDING):  famotidine Injectable 20 milliGRAM(s) IV Push two times a day  heparin   Injectable 5000 Unit(s) SubCutaneous every 8 hours  metoclopramide 5 milliGRAM(s) Oral every 6 hours  piperacillin/tazobactam IVPB.. 3.375 Gram(s) IV Intermittent every 8 hours  polyethylene glycol 3350 17 Gram(s) Oral daily  potassium chloride  10 mEq/100 mL IVPB 10 milliEquivalent(s) IV Intermittent every 1 hour  senna 2 Tablet(s) Oral at bedtime  sodium chloride 0.9%. 1000 milliLiter(s) (55 mL/Hr) IV Continuous <Continuous>  sucralfate 1 Gram(s) Oral four times a day  tamsulosin 0.4 milliGRAM(s) Oral daily    MEDICATIONS  (PRN):  acetaminophen     Tablet .. 650 milliGRAM(s) Oral every 6 hours PRN Temp greater or equal to 38C (100.4F), Mild Pain (1 - 3)  benzocaine 15 mG/menthol 3.6 mG Lozenge 1 Lozenge Oral three times a day PRN Sore Throat  guaiFENesin  milliGRAM(s) Oral every 12 hours PRN Cough  ondansetron Injectable 4 milliGRAM(s) IV Push every 6 hours PRN Nausea and/or Vomiting    PHYSICAL EXAM:  GENERAL: NAD, well-developed  HEAD:  Atraumatic, Normocephalic  EYES: EOMI, PERRLA, conjunctiva and sclera clear  NECK: Supple, No JVD  CHEST/LUNG: Clear to auscultation bilaterally; No wheeze  HEART: Regular rate and rhythm; No murmurs, rubs, or gallops  ABDOMEN: Soft, Nontender, Nondistended; Bowel sounds present  EXTREMITIES:  2+ Peripheral Pulses, No clubbing, cyanosis, or edema  PSYCH: AAOx3  NEUROLOGY: non-focal  SKIN: No rashes or lesions                        11.6   15.20 )-----------( 425      ( 08 Jul 2022 22:32 )             31.5               123|96|8<139  2.7|14|0.51  7.5,1.0,--  07-08 @ 21:46  125|98|9<104  2.9|13|0.52  7.6,--,--  07-08 @ 10:01

## 2022-07-08 NOTE — PROGRESS NOTE ADULT - SUBJECTIVE AND OBJECTIVE BOX
[facility]-- WOUND TEAM -- FOLLOW UP NOTE  --------------------------------------------------------------------------------    24 hour events/subjective:          Diet:      ROS: General/ SKIN/ MSK/ Neuro/ GI see HPI  all other systems negative  pt unable to offer    ALLERGIES & MEDICATIONS  --------------------------------------------------------------------------------  Allergies    No Known Allergies    Intolerances          STANDING INPATIENT MEDICATIONS    dextrose 5% + sodium chloride 0.45%. 1000 milliLiter(s) IV Continuous <Continuous>  famotidine Injectable 20 milliGRAM(s) IV Push two times a day  heparin   Injectable 5000 Unit(s) SubCutaneous every 8 hours  metoclopramide 5 milliGRAM(s) Oral every 6 hours  piperacillin/tazobactam IVPB.. 3.375 Gram(s) IV Intermittent every 8 hours  polyethylene glycol 3350 17 Gram(s) Oral daily  senna 2 Tablet(s) Oral at bedtime  sucralfate 1 Gram(s) Oral four times a day  tamsulosin 0.4 milliGRAM(s) Oral daily      PRN INPATIENT MEDICATION  acetaminophen     Tablet .. 650 milliGRAM(s) Oral every 6 hours PRN  benzocaine 15 mG/menthol 3.6 mG Lozenge 1 Lozenge Oral three times a day PRN  guaiFENesin  milliGRAM(s) Oral every 12 hours PRN  ondansetron Injectable 4 milliGRAM(s) IV Push every 6 hours PRN        VITALS/PHYSICAL EXAM  --------------------------------------------------------------------------------  T(C): 36.3 (07-08-22 @ 13:00), Max: 36.7 (07-07-22 @ 21:46)  HR: 76 (07-08-22 @ 13:00) (76 - 104)  BP: 136/81 (07-08-22 @ 13:00) (123/65 - 140/83)  RR: 18 (07-08-22 @ 13:00) (18 - 18)  SpO2: 98% (07-08-22 @ 13:00) (97% - 99%)  Wt(kg): --        07-07-22 @ 07:01  -  07-08-22 @ 07:00  --------------------------------------------------------  IN: 2870 mL / OUT: 0 mL / NET: 2870 mL            LABS/ CULTURES/ RADIOLOGY:    125  |  98  |  9   ----------------------------<  104      [07-08-22 @ 10:01]  2.9   |  13  |  0.52        Ca     7.6     [07-08-22 @ 10:01]                CAPILLARY BLOOD GLUCOSE                           [facility]-- WOUND TEAM -- FOLLOW UP NOTE  --------------------------------------------------------------------------------    24 hour events/subjective:  afebrile  tolerating po w/ occasional nausea  weak- awaiting dc to [facility]  incontinent  no c/o pain, odor, excess drainage      ROS: General/ SKIN/ MSK/ GI see HPI  all other systems negative    ALLERGIES & MEDICATIONS  --------------------------------------------------------------------------------      No Known Allergies      STANDING INPATIENT MEDICATIONS  dextrose 5% + sodium chloride 0.45%. 1000 milliLiter(s) IV Continuous <Continuous>  famotidine Injectable 20 milliGRAM(s) IV Push two times a day  heparin   Injectable 5000 Unit(s) SubCutaneous every 8 hours  metoclopramide 5 milliGRAM(s) Oral every 6 hours  piperacillin/tazobactam IVPB.. 3.375 Gram(s) IV Intermittent every 8 hours  polyethylene glycol 3350 17 Gram(s) Oral daily  senna 2 Tablet(s) Oral at bedtime  sucralfate 1 Gram(s) Oral four times a day  tamsulosin 0.4 milliGRAM(s) Oral daily      PRN INPATIENT MEDICATION  acetaminophen     Tablet .. 650 milliGRAM(s) Oral every 6 hours PRN  benzocaine 15 mG/menthol 3.6 mG Lozenge 1 Lozenge Oral three times a day PRN  guaiFENesin  milliGRAM(s) Oral every 12 hours PRN  ondansetron Injectable 4 milliGRAM(s) IV Push every 6 hours PRN        VITALS/PHYSICAL EXAM  --------------------------------------------------------------------------------  T(C): 36.3 (07-08-22 @ 13:00), Max: 36.7 (07-07-22 @ 21:46)  HR: 76 (07-08-22 @ 13:00) (76 - 104)  BP: 136/81 (07-08-22 @ 13:00) (123/65 - 140/83)  RR: 18 (07-08-22 @ 13:00) (18 - 18)  SpO2: 98% (07-08-22 @ 13:00) (97% - 99%)  Wt(kg): --        07-07-22 @ 07:01  -  07-08-22 @ 07:00  --------------------------------------------------------  IN: 2870 mL / OUT: 0 mL / NET: 2870 mL      NAD,  A&Ox3, Obese, frail  [employer] P500 bed  HEENT:  NC/AT, EOMI, sclera clear, mucosa moist, throat clear, trachea midline, neck supple  Neurology:  weakened strength & sensation grossly intact  Psych: calm/ appropriate  Musculoskeletal: FROM, no deformities/ contractures  Vascular: BLE equally warm,  no cyanosis, clubbing,  nor acute ischemia noted       mild BLE edema equal, faint BLE DP/PT pulses palpable      BLE hemosiderin staining      Lt heel w/ resolving dry blister / dti 4cm x 5cm x 0cm      Rt lateral calf wound 10cmx 3.5cm x 0.5cm w/ moist and granular tissue and            less necrotic tissue, distal 3rd w/ DTI w/o blistering      (+)serosanguinous drainage  No odor, erythema, increased warmth, tenderness, induration, fluctuance, nor crepitus  Skin: pale, frail,  ecchymosis w/o hematoma      Bilateral buttocks/ sacrum w/ hyperpigmentation of incontinence dermatitis      Rt sacrum DTI w/ denuded skin        3.5cm x 4cm x 0cm        no active drainage  No odor, erythema, increased warmth, tenderness, induration, fluctuance, nor crepitus      LABS/ CULTURES/ RADIOLOGY:    125  |  98  |  9   ----------------------------<  104      [07-08-22 @ 10:01]  2.9   |  13  |  0.52        Ca     7.6     [07-08-22 @ 10:01]                CAPILLARY BLOOD GLUCOSE

## 2022-07-08 NOTE — SWALLOW BEDSIDE ASSESSMENT ADULT - SPECIFY REASON(S)
to subjectively assess swallow safety/function and r/o dysphagia

## 2022-07-08 NOTE — SWALLOW BEDSIDE ASSESSMENT ADULT - PHARYNGEAL PHASE
Within functional limits
mildly delayed onset of pharyngeal swallow upon palpation; audible swallow noted x2; no overt s/s of penetration/aspiration

## 2022-07-08 NOTE — SWALLOW BEDSIDE ASSESSMENT ADULT - SLP GENERAL OBSERVATIONS
Encountered Pt asleep in bed. Pt easily aroused to verbal cues and agreeable to this evaluation. Pt alert, verbally responsive, and cooperative throughout evaluation.

## 2022-07-08 NOTE — PROGRESS NOTE ADULT - SUBJECTIVE AND OBJECTIVE BOX
[facility]  Yannick Castillo PA-C  Former[facility] [place] [address] [phone]      INTERVAL HPI/OVERNIGHT EVENTS:  pt seen and examined, no new events  Nausea improved     MEDICATIONS  (STANDING):  dextrose 5% + sodium chloride 0.45%. 1000 milliLiter(s) (100 mL/Hr) IV Continuous <Continuous>  famotidine Injectable 20 milliGRAM(s) IV Push two times a day  heparin   Injectable 5000 Unit(s) SubCutaneous every 8 hours  metoclopramide 5 milliGRAM(s) Oral every 6 hours  piperacillin/tazobactam IVPB.. 3.375 Gram(s) IV Intermittent every 8 hours  polyethylene glycol 3350 17 Gram(s) Oral daily  senna 2 Tablet(s) Oral at bedtime  sucralfate 1 Gram(s) Oral four times a day  tamsulosin 0.4 milliGRAM(s) Oral daily    MEDICATIONS  (PRN):  acetaminophen     Tablet .. 650 milliGRAM(s) Oral every 6 hours PRN Temp greater or equal to 38C (100.4F), Mild Pain (1 - 3)  benzocaine 15 mG/menthol 3.6 mG Lozenge 1 Lozenge Oral three times a day PRN Sore Throat  guaiFENesin  milliGRAM(s) Oral every 12 hours PRN Cough  ondansetron Injectable 4 milliGRAM(s) IV Push every 6 hours PRN Nausea and/or Vomiting      Allergies    No Known Allergies    Intolerances        ROS:   General:  No wt loss, fevers, chills, night sweats, fatigue,   Eyes:  Good vision, no reported pain  ENT:  No sore throat, pain, runny nose, dysphagia  CV:  No pain, palpitations, hypo/hypertension  Resp:  No dyspnea, cough, tachypnea, wheezing  GI:  No pain, No nausea, No vomiting, No diarrhea, No constipation, No weight loss, No fever, No pruritis, No rectal bleeding, No tarry stools, No dysphagia,  :  No pain, bleeding, incontinence, nocturia  Muscle:  No pain, weakness  Neuro:  No weakness, tingling, memory problems  Psych:  No fatigue, insomnia, mood problems, depression  Endocrine:  No polyuria, polydipsia, cold/heat intolerance  Heme:  No petechiae, ecchymosis, easy bruisability  Skin:  No rash, tattoos, scars, edema      PHYSICAL EXAM:   VVital Signs Last 24 Hrs  T(C): 36.4 (08 Jul 2022 09:00), Max: 36.7 (07 Jul 2022 21:46)  T(F): 97.5 (08 Jul 2022 09:00), Max: 98.1 (07 Jul 2022 21:46)  HR: 98 (08 Jul 2022 09:00) (94 - 104)  BP: 134/83 (08 Jul 2022 09:00) (123/65 - 140/83)  BP(mean): --  RR: 18 (08 Jul 2022 09:00) (18 - 18)  SpO2: 98% (08 Jul 2022 09:00) (97% - 99%)    Parameters below as of 08 Jul 2022 09:00  Patient On (Oxygen Delivery Method): room air      Daily     Daily     GENERAL:  Appears stated age,   HEENT:  NC/AT,    CHEST:  Full & symmetric excursion,   HEART:  Regular rhythm,  ABDOMEN:  Soft, non-tender, non-distended,  EXTEREMITIES:  no cyanosis  SKIN:  No rash  NEURO:  Alert,       LABS:                RADIOLOGY & ADDITIONAL TESTS:

## 2022-07-08 NOTE — SWALLOW BEDSIDE ASSESSMENT ADULT - SWALLOW EVAL: DIAGNOSIS
81 year old female with PMHx HTN, chronic back and leg pain (wheel chair bound at baseline) presents to ED c/o vomiting found to have a right distal stone, leukocytosis, and UTI. Pt presents w/ a subjectively functional oropharyngeal swallow. Pt denies c/o difficulties swallowing and/or odynophagia. No overt s/s of laryngeal penetration/aspiration observed. Pt reports reduced nausea/vomiting, however, poor appetite persists.

## 2022-07-08 NOTE — SWALLOW BEDSIDE ASSESSMENT ADULT - SWALLOW EVAL: CRITERIA FOR SKILLED INTERVENTION MET
no problems identified which require skilled intervention
will f/u at a later date/time pending patient participation
will f/u at a later date/time pending patient participation

## 2022-07-08 NOTE — SWALLOW BEDSIDE ASSESSMENT ADULT - DIET PRIOR TO ADMI
Regular consistencies/thin liquids

## 2022-07-08 NOTE — PROGRESS NOTE ADULT - ASSESSMENT
A/P: 81 year old female s/p left ureteral stent placement for a ureteral stone and fevers, POD#1    Wound Consult requested to assist w/ management of Sacral/ buttocks DTI  Incontinence of urine and stool w/ dermatitis   RLE wound  Lt heel DTI  BLE PVD    RLE wounds- medihoney dressing QD  Lt heel- Cavilon QD  Consider DMITRI/PVR, XRay as pt w/ RLE wounds- as ability to heal      can be done as outpatient  BLE elevation & Compression  Buttocks/ Sacrum: HARMEET cream BIDand prn soiling //       Continue w/ attends under pads and Pericare as per protocol  Abx per medicine  Moisturize intact skin w/ SWEEN cream BID  Nutritional optimization        encourage high quality protein, MVI & Vit C to promote wound healing  Continue turning and positioning w/ offloading assistive devices as per protocol  Waffle Cushion to chair when oob to chair  Continue w/ low air loss pressure redistribution bed surface   Care as per medicine, will follow w/ you  Upon discharge f/u as outpatient [facility] [phone]  D/w team & RN  I spent 25minutes face to face w/ this pt of which more than 50% of the time was spent counseling & coordinating care of this pt.   Karla Rivera PA-C [id number]

## 2022-07-08 NOTE — SWALLOW BEDSIDE ASSESSMENT ADULT - COMMENTS
CT Abdomen and Pelvis w/ IV Cont 6/30/22: Mild left hydroureteronephrosis secondary to a 5 mm calculus in the distal ureter. Diffuse urothelial enhancement of the left renal pelvis and ureter as well as the bladder with subtle perivesicular fat stranding, concerning for urinary tract infection.  Small bilateral pleural effusions.  XR CHEST 6/30/22: IMPRESSION: Clear lungs.
Hx continued:   GI consult 7/5/22- recommendation for Pt to continue diet as tolerated, start reglan, continue PPI, and continue carafate.   CT Abdomen and Pelvis w/ IV Cont 6/30/22: Mild left hydroureteronephrosis secondary to a 5 mm calculus in the distal ureter. Diffuse urothelial enhancement of the left renal pelvis and ureter as well as the bladder with subtle perivesicular fat stranding, concerning for urinary tract infection.  Small bilateral pleural effusions.  XR CHEST 6/30/22: IMPRESSION: Clear lungs.
Hx continued:   GI consult 7/5/22- recommendation for Pt to continue diet as tolerated, start reglan, continue PPI, and continue carafate.   CT Abdomen and Pelvis w/ IV Cont 6/30/22: Mild left hydroureteronephrosis secondary to a 5 mm calculus in the distal ureter. Diffuse urothelial enhancement of the left renal pelvis and ureter as well as the bladder with subtle perivesicular fat stranding, concerning for urinary tract infection.  Small bilateral pleural effusions.  XR CHEST 6/30/22: IMPRESSION: Clear lungs.

## 2022-07-09 NOTE — PROGRESS NOTE ADULT - SUBJECTIVE AND OBJECTIVE BOX
[facility]  Yannick Castillo PA-C  [facility] [place] [address] [phone]      INTERVAL HPI/OVERNIGHT EVENTS:  pt seen and examined, no new events  Nausea improved     MEDICATIONS  (STANDING):  dextrose 5% + sodium chloride 0.45%. 1000 milliLiter(s) (100 mL/Hr) IV Continuous <Continuous>  famotidine Injectable 20 milliGRAM(s) IV Push two times a day  heparin   Injectable 5000 Unit(s) SubCutaneous every 8 hours  metoclopramide 5 milliGRAM(s) Oral every 6 hours  piperacillin/tazobactam IVPB.. 3.375 Gram(s) IV Intermittent every 8 hours  polyethylene glycol 3350 17 Gram(s) Oral daily  senna 2 Tablet(s) Oral at bedtime  sucralfate 1 Gram(s) Oral four times a day  tamsulosin 0.4 milliGRAM(s) Oral daily    MEDICATIONS  (PRN):  acetaminophen     Tablet .. 650 milliGRAM(s) Oral every 6 hours PRN Temp greater or equal to 38C (100.4F), Mild Pain (1 - 3)  benzocaine 15 mG/menthol 3.6 mG Lozenge 1 Lozenge Oral three times a day PRN Sore Throat  guaiFENesin  milliGRAM(s) Oral every 12 hours PRN Cough  ondansetron Injectable 4 milliGRAM(s) IV Push every 6 hours PRN Nausea and/or Vomiting      Allergies    No Known Allergies    Intolerances        ROS:   General:  No wt loss, fevers, chills, night sweats, fatigue,   Eyes:  Good vision, no reported pain  ENT:  No sore throat, pain, runny nose, dysphagia  CV:  No pain, palpitations, hypo/hypertension  Resp:  No dyspnea, cough, tachypnea, wheezing  GI:  No pain, No nausea, No vomiting, No diarrhea, No constipation, No weight loss, No fever, No pruritis, No rectal bleeding, No tarry stools, No dysphagia,  :  No pain, bleeding, incontinence, nocturia  Muscle:  No pain, weakness  Neuro:  No weakness, tingling, memory problems  Psych:  No fatigue, insomnia, mood problems, depression  Endocrine:  No polyuria, polydipsia, cold/heat intolerance  Heme:  No petechiae, ecchymosis, easy bruisability  Skin:  No rash, tattoos, scars, edema      PHYSICAL EXAM:   VVital Signs Last 24 Hrs  T(C): 36.4 (08 Jul 2022 09:00), Max: 36.7 (07 Jul 2022 21:46)  T(F): 97.5 (08 Jul 2022 09:00), Max: 98.1 (07 Jul 2022 21:46)  HR: 98 (08 Jul 2022 09:00) (94 - 104)  BP: 134/83 (08 Jul 2022 09:00) (123/65 - 140/83)  BP(mean): --  RR: 18 (08 Jul 2022 09:00) (18 - 18)  SpO2: 98% (08 Jul 2022 09:00) (97% - 99%)    Parameters below as of 08 Jul 2022 09:00  Patient On (Oxygen Delivery Method): room air      Daily     Daily     GENERAL:  Appears stated age,   HEENT:  NC/AT,    CHEST:  Full & symmetric excursion,   HEART:  Regular rhythm,  ABDOMEN:  Soft, non-tender, non-distended,  EXTEREMITIES:  no cyanosis  SKIN:  No rash  NEURO:  Alert,       LABS:                RADIOLOGY & ADDITIONAL TESTS:

## 2022-07-09 NOTE — PROGRESS NOTE ADULT - ASSESSMENT
81 year old female with PMHx of HTN, chronic back and leg pain (wheelchair bound at baseline) presents to ED c/o vomiting x 2 days.   Found to have a 5mm L distal stone on CT scan. Pt denies history of stones, no flank pain, dysuria, urgency, frequency or gross hematuria.        Severe Electrolyte Imbalance Supplement as needed  Moniotr BMP     Hyponatremia SIADH  dehydration   Free water restriction   Renal consult       Nausea vomiting   Dysphagia     GI consulted   Recommend Reglan Zofran prn   Carafate  d/c planning        Nephrolithiasis s/p stent placement   Patient afebrile   Cultures shows Emterococci om Zosyn change to Levaquin on discharge   ID consulted   Urology following     HTN Controlled off meds     Chronic wounds wound care consult appreciated

## 2022-07-09 NOTE — PROVIDER CONTACT NOTE (CRITICAL VALUE NOTIFICATION) - PERSON GIVING RESULT:
[facility]/ Lior Hall
[facility] - Lab
[facility] Chente Minor
Lab
[facility]/ Melony Kwon

## 2022-07-09 NOTE — PROGRESS NOTE ADULT - SUBJECTIVE AND OBJECTIVE BOX
CC: f/u for UTI     Patient reports she is cold     REVIEW OF SYSTEMS:  All other review of systems negative (Comprehensive ROS)    Antimicrobials Day #  :day 9  piperacillin/tazobactam IVPB.. 3.375 Gram(s) IV Intermittent every 8 hours    Other Medications Reviewed    Vital Signs Last 24 Hrs  T(C): 36.3 (09 Jul 2022 10:18), Max: 36.8 (08 Jul 2022 17:37)  T(F): 97.3 (09 Jul 2022 10:18), Max: 98.3 (08 Jul 2022 17:37)  HR: 79 (09 Jul 2022 10:18) (76 - 90)  BP: 135/78 (09 Jul 2022 10:18) (121/80 - 138/83)  BP(mean): --  RR: 17 (09 Jul 2022 10:18) (17 - 18)  SpO2: 97% (09 Jul 2022 10:18) (96% - 98%)    Parameters below as of 09 Jul 2022 10:18  Patient On (Oxygen Delivery Method): room air            PHYSICAL EXAM:  General: alert, no acute distress  Eyes:  anicteric, no conjunctival injection, no discharge  Oropharynx: no lesions or injection 	  Neck: supple, without adenopathy  Lungs: clear to auscultation  Heart: regular rate and rhythm; no murmur, rubs or gallops  Abdomen: soft, nondistended, nontender, without mass or organomegaly  Skin: no lesions  Extremities: no clubbing, cyanosis, or edema  Neurologic: alert, oriented, moves all extremities    LAB RESULTS:                MICROBIOLOGY:  RECENT CULTURES:      RADIOLOGY REVIEWED:

## 2022-07-09 NOTE — PROVIDER CONTACT NOTE (CRITICAL VALUE NOTIFICATION) - ACTION/TREATMENT ORDERED:
ABG ordered to reassess lab for possible inaccurate result. BEN Ro came to bedside to draw ABG. EKG was ordered and completed.
BEN Ro made aware. No interventions ordered.
POCT ordered - to be completed
IV magnesium and Potassium phosphate ordered.
No other interventions ordered at this time.

## 2022-07-09 NOTE — PROGRESS NOTE ADULT - ASSESSMENT
81y female with PMHx of HTN, chronic back and leg pain (wheelchair bound at baseline) presents to ED c/o vomiting x 2 days. Today patient had wound care appointment for pressure wound to her R calf and vomited again so was brought in by aide. Found to have a 5mm L distal stone on CT scan. Pt denies history of stones, no flank pain, dysuria, urgency, frequency or gross hematuria. On 6/30 the patient underwent a cysto procedure with left stent placement. Urine culture was sent and now reporting growing Klebsiella and Enterococcus in the urine culture. ID consulted for further antibiotics management.  Reviewed blood cultures are reported to be no growth to date.   She appears stable from ID viewpoint.  The patient reports she is feeling better   reviewed her wbc , noted to be 15K, suspect to be reactive, she feels better and no fever have been recorded   Today is day 9 of zosyn.    Plan   continue Zosyn IV last day planned for tomorrow , she is tolerating abx , trend cbc   continue supportive care as per medicine and urology teams

## 2022-07-09 NOTE — CHART NOTE - NSCHARTNOTEFT_GEN_A_CORE
Multiple "issues" obtaining lab work on this patient - lab results appear to be unreliable  Most recently (results for 1654, 7/9/22) - RN contacted this author for K+ reported as 8.0  Patient immediately seen and examined - 12 Lead EKG obtained - No T wave abnormality appreciated.  This author obtained STAT blood work via arterial stick. - as per "STAT" lab - K+ 3.6  Pending full BMP and CBC from lab  Dr Sims updated.  Will advise on coming shift to follow up full repeat labs

## 2022-07-10 NOTE — CONSULT NOTE ADULT - SUBJECTIVE AND OBJECTIVE BOX
Dr. Villagran  Office [phone] (9 am to 5 pm)  Service : [phone] ( 5pm to 9 am)    Bianca YBARRA      RENAL INITIAL CONSULT NOTE: DATE OF SERVICE 07-10-22 @ 10:59    HPI:  81 year old female with PMHx of HTN, chronic back and leg pain (wheelchair bound at baseline) presents to ED c/o vomiting x 2 days. Found to have a 5mm L distal stone on CT scan. S/P left ureteral stent, has UTI getting abx, has been having nausea/vomiting GI on board, has persistent hyponatremia    Allergies:  No Known Allergies      PAST MEDICAL & SURGICAL HISTORY:  Hypertension      Chronic back pain      Wheelchair bound      Left nephrolithiasis          Home Medications Reviewed    Hospital Medications:   MEDICATIONS  (STANDING):  famotidine Injectable 20 milliGRAM(s) IV Push two times a day  heparin   Injectable 5000 Unit(s) SubCutaneous every 8 hours  metoclopramide 5 milliGRAM(s) Oral every 6 hours  piperacillin/tazobactam IVPB.. 3.375 Gram(s) IV Intermittent every 8 hours  polyethylene glycol 3350 17 Gram(s) Oral daily  senna 2 Tablet(s) Oral at bedtime  sucralfate 1 Gram(s) Oral four times a day  tamsulosin 0.4 milliGRAM(s) Oral daily      SOCIAL HISTORY:  Denies ETOh, Smoking,     FAMILY HISTORY:      REVIEW OF SYSTEMS:  CONSTITUTIONAL: No weakness, fevers or chills  EYES/ENT: No visual changes;  No vertigo or throat pain   NECK: No pain or stiffness  RESPIRATORY: No cough, wheezing, hemoptysis; No shortness of breath  CARDIOVASCULAR: No chest pain or palpitations.  GASTROINTESTINAL: as per HPI  GENITOURINARY: No dysuria, frequency, foamy urine, urinary urgency, incontinence or hematuria  NEUROLOGICAL: No numbness or weakness  SKIN: No itching, burning, rashes, or lesions   VASCULAR: No bilateral lower extremity edema.   All other review of systems is negative unless indicated above.    VITALS:  T(F): 97.6 (07-10-22 @ 10:42), Max: 98.1 (07-09-22 @ 22:12)  HR: 96 (07-10-22 @ 10:42)  BP: 128/82 (07-10-22 @ 10:42)  RR: 20 (07-10-22 @ 10:42)  SpO2: 97% (07-10-22 @ 10:42)  Wt(kg): --    07-09 @ 07:01  -  07-10 @ 07:00  --------------------------------------------------------  IN: 310 mL / OUT: 0 mL / NET: 310 mL          PHYSICAL EXAM:  Constitutional: NAD  HEENT: anicteric sclera, oropharynx clear, MMM  Neck: No JVD  Respiratory: CTAB, no wheezes, rales or rhonchi  Cardiovascular: S1, S2, RRR  Gastrointestinal: BS+, soft, NT/ND  Extremities: No cyanosis or clubbing. No peripheral edema  Neurological: A/O x 3, no focal deficits  Psychiatric: Normal mood, normal affect  : No CVA tenderness. No macedo.   Skin: No rashes       LABS:  07-10    124<L>  |  99  |  8   ----------------------------<  77  4.8   |  14<L>  |  0.55    Ca    7.8<L>      10 Jul 2022 09:01  Phos  3.1     07-10  Mg     1.9     07-10      Creatinine Trend: 0.55 <--, 0.51 <--, 0.48 <--, 0.49 <--, 0.51 <--, 0.52 <--                        12.4   16.42 )-----------( 440      ( 10 Jul 2022 09:01 )             34.0     Urine Studies:        RADIOLOGY & ADDITIONAL STUDIES:

## 2022-07-10 NOTE — PROGRESS NOTE ADULT - ASSESSMENT
81 year old female with PMHx of HTN, chronic back and leg pain (wheelchair bound at baseline) presents to ED c/o vomiting x 2 days.   Found to have a 5mm L distal stone on CT scan. Pt denies history of stones, no flank pain, dysuria, urgency, frequency or gross hematuria.        Severe Electrolyte Imbalance Supplement as needed  Moniotr BMP     Hyponatremia SIADH  dehydration   Free water restriction   Renal consult called       Nausea vomiting   Dysphagia     GI consulted   Recommend Reglan Zofran prn   Carafate  d/c planning        Nephrolithiasis s/p stent placement   Patient afebrile   Cultures shows Emterococci om Zosyn change to Levaquin on discharge   ID consulted   Urology following     HTN Controlled off meds     Chronic wounds wound care consult appreciated

## 2022-07-10 NOTE — PROGRESS NOTE ADULT - SUBJECTIVE AND OBJECTIVE BOX
CC: f/u for UTI     Patient is resting in bed     REVIEW OF SYSTEMS:  All other review of systems negative (Comprehensive ROS)    Antimicrobials Day #  :day 9  piperacillin/tazobactam IVPB.. 3.375 Gram(s) IV Intermittent every 8 hours    Other Medications Reviewed    Vital Signs Last 24 Hrs  T(C): 36.3 (10 Jul 2022 05:07), Max: 36.7 (09 Jul 2022 17:06)  T(F): 97.4 (10 Jul 2022 05:07), Max: 98.1 (09 Jul 2022 22:12)  HR: 98 (10 Jul 2022 05:07) (64 - 100)  BP: 123/90 (10 Jul 2022 05:07) (115/63 - 135/78)  BP(mean): --  RR: 20 (10 Jul 2022 05:07) (17 - 20)  SpO2: 98% (10 Jul 2022 05:07) (95% - 99%)    Parameters below as of 10 Jul 2022 05:07  Patient On (Oxygen Delivery Method): room air      PHYSICAL EXAM:  General: alert, no acute distress  Eyes:  anicteric, no conjunctival injection, no discharge  Oropharynx: no lesions or injection 	  Neck: supple, without adenopathy  Lungs: clear to auscultation  Heart: regular rate and rhythm; no murmur, rubs or gallops  Abdomen: soft, nondistended, nontender, without mass or organomegaly  Skin: no lesions  Extremities: no clubbing, cyanosis, or edema  Neurologic: alert, oriented, moves all extremities    LAB RESULTS:                          12.4   16.42 )-----------( 440      ( 10 Jul 2022 09:01 )             34.0       07-10    124<L>  |  99  |  8   ----------------------------<  77  4.8   |  14<L>  |  0.55    Ca    7.8<L>      10 Jul 2022 09:01  Phos  3.1     07-10  Mg     1.9     07-10              MICROBIOLOGY:  RECENT CULTURES:      RADIOLOGY REVIEWED:

## 2022-07-10 NOTE — CONSULT NOTE ADULT - ASSESSMENT
81 year old female with PMHx of HTN, chronic back and leg pain (wheelchair bound at baseline) presents to ED c/o vomiting x 2 days. Found to have a 5mm L distal stone on CT scan. S/P left ureteral stent, has UTI getting abx, has been having nausea/vomiting GI on board, has persistent hyponatremia    A/P:  Hyponatremia:  Etiology?  Dehydration Vs SIADH  Get Urine na, osmolality    Acidosis:  Non-AG    Hypocalcemia:  sec to low albumin 81 year old female with PMHx of HTN, chronic back and leg pain (wheelchair bound at baseline) presents to ED c/o vomiting x 2 days. Found to have a 5mm L distal stone on CT scan. S/P left ureteral stent, has UTI getting abx, has been having nausea/vomiting GI on board, has persistent hyponatremia    A/P:  Hyponatremia:  Etiology?  Dehydration Vs SIADH  Clinically hypo to isovolemic  Get Urine na, osmolality, TSH, serum cortisol in AM  1/2NS+NaHCO3 75meq-250cc/hr for 4 hrs  Repeat chemistry at 5PM-call me to discuss the result  Na should not increase more than 6-8meq in 24hrs    Acidosis:  Non-AG  IVF as above    Hypocalcemia:  sec to low albumin

## 2022-07-10 NOTE — PROGRESS NOTE ADULT - SUBJECTIVE AND OBJECTIVE BOX
HPI:  81 year old female with PMHx of HTN, chronic back and leg pain (wheelchair bound at baseline) presents to ED c/o vomiting x 2 days. Today patient had wound care appointment for pressure wound to her R calf and vomited again so was brought in by aide. Found to have a 5mm L distal stone on CT scan. Pt denies history of stones, no flank pain, dysuria, urgency, frequency or gross hematuria.  (30 Jun 2022 17:15)      PAST MEDICAL & SURGICAL HISTORY:  Hypertension      Chronic back pain      Wheelchair bound      Left nephrolithiasis          Review of Systems:   CONSTITUTIONAL: No fever, weight loss, or fatigue  EYES: No eye pain, visual disturbances, or discharge  ENMT:  No difficulty hearing, tinnitus, vertigo; No sinus or throat pain  NECK: No pain or stiffness  BREASTS: No pain, masses, or nipple discharge  RESPIRATORY: No cough, wheezing, chills or hemoptysis; No shortness of breath  CARDIOVASCULAR: No chest pain, palpitations, dizziness, or leg swelling  GASTROINTESTINAL: No abdominal or epigastric pain. No nausea, vomiting, or hematemesis; No diarrhea or constipation. No melena or hematochezia.  GENITOURINARY: No dysuria, frequency, hematuria, or incontinence  NEUROLOGICAL: No headaches, memory loss, loss of strength, numbness, or tremors  SKIN: No itching, burning, rashes, or lesions   LYMPH NODES: No enlarged glands  ENDOCRINE: No heat or cold intolerance; No hair loss  MUSCULOSKELETAL: No joint pain or swelling; No muscle, back, or extremity pain  PSYCHIATRIC: No depression, anxiety, mood swings, or difficulty sleeping  HEME/LYMPH: No easy bruising, or bleeding gums  ALLERY AND IMMUNOLOGIC: No hives or eczema    Allergies    No Known Allergies    Intolerances        Social History:     FAMILY HISTORY:    T(C): 36.4 (07-10-22 @ 22:05), Max: 36.7 (07-10-22 @ 14:14)  HR: 94 (07-10-22 @ 22:05) (92 - 94)  BP: 116/78 (07-10-22 @ 22:05) (116/78 - 120/79)  RR: 20 (07-10-22 @ 22:05) (20 - 20)  SpO2: 97% (07-10-22 @ 22:05) (95% - 97%)    MEDICATIONS  (STANDING):  famotidine Injectable 20 milliGRAM(s) IV Push two times a day  heparin   Injectable 5000 Unit(s) SubCutaneous every 8 hours  metoclopramide 5 milliGRAM(s) Oral every 6 hours  polyethylene glycol 3350 17 Gram(s) Oral daily  senna 2 Tablet(s) Oral at bedtime  sodium bicarbonate  Infusion 0.23 mEq/kG/Hr (250 mL/Hr) IV Continuous <Continuous>  sucralfate 1 Gram(s) Oral four times a day  tamsulosin 0.4 milliGRAM(s) Oral daily    MEDICATIONS  (PRN):  acetaminophen     Tablet .. 650 milliGRAM(s) Oral every 6 hours PRN Temp greater or equal to 38C (100.4F), Mild Pain (1 - 3)  benzocaine 15 mG/menthol 3.6 mG Lozenge 1 Lozenge Oral three times a day PRN Sore Throat  guaiFENesin  milliGRAM(s) Oral every 12 hours PRN Cough  ondansetron Injectable 4 milliGRAM(s) IV Push every 6 hours PRN Nausea and/or Vomiting          PHYSICAL EXAM:  GENERAL: NAD, well-developed  HEAD:  Atraumatic, Normocephalic  EYES: EOMI, PERRLA, conjunctiva and sclera clear  NECK: Supple, No JVD  CHEST/LUNG: Clear to auscultation bilaterally; No wheeze  HEART: Regular rate and rhythm; No murmurs, rubs, or gallops  ABDOMEN: Soft, Nontender, Nondistended; Bowel sounds present  EXTREMITIES:  2+ Peripheral Pulses, No clubbing, cyanosis, or edema  PSYCH: AAOx3  NEUROLOGY: non-focal  SKIN: No rashes or lesions                                  12.4   16.42 )-----------( 440      ( 10 Jul 2022 09:01 )             34.0               124|99|8<77  4.8|14|0.55  7.8,1.9,3.1  07-10 @ 09:01

## 2022-07-10 NOTE — PROGRESS NOTE ADULT - ASSESSMENT
81y female with PMHx of HTN, chronic back and leg pain (wheelchair bound at baseline) presents to ED c/o vomiting x 2 days. Today patient had wound care appointment for pressure wound to her R calf and vomited again so was brought in by aide. Found to have a 5mm L distal stone on CT scan. Pt denies history of stones, no flank pain, dysuria, urgency, frequency or gross hematuria. On 6/30 the patient underwent a cysto procedure with left stent placement. Urine culture was sent and now reporting growing Klebsiella and Enterococcus in the urine culture. ID consulted for further antibiotics management.  Reviewed blood cultures are reported to be no growth to date.   She appears stable from ID viewpoint.  The patient reports she is feeling better   reviewed her wbc , noted to be 16K, suspect to be reactive, she feels better and no fever have been recorded   Today is day 10 of zosyn.    Plan   continue Zosyn IV last day today   continue wound care of sacral DTI and right calf wound as per wound care team   continue supportive care as per medicine and urology teams

## 2022-07-10 NOTE — PROGRESS NOTE ADULT - SUBJECTIVE AND OBJECTIVE BOX
[facility]  Yannick Castillo PA-C  [facility] [place] [address] [phone]      INTERVAL HPI/OVERNIGHT EVENTS:  pt seen and examined, no new events  Nausea improved     MEDICATIONS  (STANDING):  dextrose 5% + sodium chloride 0.45%. 1000 milliLiter(s) (100 mL/Hr) IV Continuous <Continuous>  famotidine Injectable 20 milliGRAM(s) IV Push two times a day  heparin   Injectable 5000 Unit(s) SubCutaneous every 8 hours  metoclopramide 5 milliGRAM(s) Oral every 6 hours  piperacillin/tazobactam IVPB.. 3.375 Gram(s) IV Intermittent every 8 hours  polyethylene glycol 3350 17 Gram(s) Oral daily  senna 2 Tablet(s) Oral at bedtime  sucralfate 1 Gram(s) Oral four times a day  tamsulosin 0.4 milliGRAM(s) Oral daily    MEDICATIONS  (PRN):  acetaminophen     Tablet .. 650 milliGRAM(s) Oral every 6 hours PRN Temp greater or equal to 38C (100.4F), Mild Pain (1 - 3)  benzocaine 15 mG/menthol 3.6 mG Lozenge 1 Lozenge Oral three times a day PRN Sore Throat  guaiFENesin  milliGRAM(s) Oral every 12 hours PRN Cough  ondansetron Injectable 4 milliGRAM(s) IV Push every 6 hours PRN Nausea and/or Vomiting      Allergies    No Known Allergies    Intolerances        ROS:   General:  No wt loss, fevers, chills, night sweats, fatigue,   Eyes:  Good vision, no reported pain  ENT:  No sore throat, pain, runny nose, dysphagia  CV:  No pain, palpitations, hypo/hypertension  Resp:  No dyspnea, cough, tachypnea, wheezing  GI:  No pain, No nausea, No vomiting, No diarrhea, No constipation, No weight loss, No fever, No pruritis, No rectal bleeding, No tarry stools, No dysphagia,  :  No pain, bleeding, incontinence, nocturia  Muscle:  No pain, weakness  Neuro:  No weakness, tingling, memory problems  Psych:  No fatigue, insomnia, mood problems, depression  Endocrine:  No polyuria, polydipsia, cold/heat intolerance  Heme:  No petechiae, ecchymosis, easy bruisability  Skin:  No rash, tattoos, scars, edema      PHYSICAL EXAM:   VVital Signs Last 24 Hrs  T(C): 36.4 (08 Jul 2022 09:00), Max: 36.7 (07 Jul 2022 21:46)  T(F): 97.5 (08 Jul 2022 09:00), Max: 98.1 (07 Jul 2022 21:46)  HR: 98 (08 Jul 2022 09:00) (94 - 104)  BP: 134/83 (08 Jul 2022 09:00) (123/65 - 140/83)  BP(mean): --  RR: 18 (08 Jul 2022 09:00) (18 - 18)  SpO2: 98% (08 Jul 2022 09:00) (97% - 99%)    Parameters below as of 08 Jul 2022 09:00  Patient On (Oxygen Delivery Method): room air      Daily     Daily     GENERAL:  Appears stated age,   HEENT:  NC/AT,    CHEST:  Full & symmetric excursion,   HEART:  Regular rhythm,  ABDOMEN:  Soft, non-tender, non-distended,  EXTEREMITIES:  no cyanosis  SKIN:  No rash  NEURO:  Alert,       LABS:                RADIOLOGY & ADDITIONAL TESTS:

## 2022-07-11 NOTE — PROGRESS NOTE ADULT - SUBJECTIVE AND OBJECTIVE BOX
[facility]  Yannick Castillo PA-C  [facility] [place] [address] [phone]      INTERVAL HPI/OVERNIGHT EVENTS:  pt seen and examined, no new events  no vomiting    MEDICATIONS  (STANDING):  famotidine Injectable 20 milliGRAM(s) IV Push two times a day  heparin   Injectable 5000 Unit(s) SubCutaneous every 8 hours  metoclopramide 5 milliGRAM(s) Oral every 6 hours  polyethylene glycol 3350 17 Gram(s) Oral daily  senna 2 Tablet(s) Oral at bedtime  sodium bicarbonate  Infusion 0.23 mEq/kG/Hr (250 mL/Hr) IV Continuous <Continuous>  sucralfate 1 Gram(s) Oral four times a day  tamsulosin 0.4 milliGRAM(s) Oral daily    MEDICATIONS  (PRN):  acetaminophen     Tablet .. 650 milliGRAM(s) Oral every 6 hours PRN Temp greater or equal to 38C (100.4F), Mild Pain (1 - 3)  benzocaine 15 mG/menthol 3.6 mG Lozenge 1 Lozenge Oral three times a day PRN Sore Throat  guaiFENesin  milliGRAM(s) Oral every 12 hours PRN Cough  ondansetron Injectable 4 milliGRAM(s) IV Push every 6 hours PRN Nausea and/or Vomiting      Allergies    No Known Allergies    Intolerances        ROS:   General:  No wt loss, fevers, chills, night sweats, fatigue,   Eyes:  Good vision, no reported pain  ENT:  No sore throat, pain, runny nose, dysphagia  CV:  No pain, palpitations, hypo/hypertension  Resp:  No dyspnea, cough, tachypnea, wheezing  GI:  No pain, No nausea, No vomiting, No diarrhea, No constipation, No weight loss, No fever, No pruritis, No rectal bleeding, No tarry stools, No dysphagia,  :  No pain, bleeding, incontinence, nocturia  Muscle:  No pain, weakness  Neuro:  No weakness, tingling, memory problems  Psych:  No fatigue, insomnia, mood problems, depression  Endocrine:  No polyuria, polydipsia, cold/heat intolerance  Heme:  No petechiae, ecchymosis, easy bruisability  Skin:  No rash, tattoos, scars, edema      PHYSICAL EXAM:   Vital Signs:  Vital Signs Last 24 Hrs  T(C): 36.3 (2022 05:05), Max: 36.7 (10 Jul 2022 14:14)  T(F): 97.4 (2022 05:05), Max: 98 (10 Jul 2022 14:14)  HR: 94 (2022 05:05) (92 - 96)  BP: 134/82 (2022 05:05) (116/78 - 134/82)  BP(mean): --  RR: 18 (2022 05:05) (18 - 20)  SpO2: 98% (2022 05:05) (95% - 98%)    Parameters below as of 2022 05:05  Patient On (Oxygen Delivery Method): room air      Daily     Daily     GENERAL:  Appears stated age,   HEENT:  NC/AT,    CHEST:  Full & symmetric excursion,   HEART:  Regular rhythm,  ABDOMEN:  Soft, non-tender, non-distended,  EXTEREMITIES:  no cyanosis  SKIN:  No rash  NEURO:  Alert,       LABS:                        12.4   16.42 )-----------( 440      ( 10 Jul 2022 09:01 )             34.0     07-10    124<L>  |  99  |  8   ----------------------------<  77  4.8   |  14<L>  |  0.55    Ca    7.8<L>      10 Jul 2022 09:01  Phos  3.1     07-10  Mg     1.9     07-10        Urinalysis Basic - ( 10 Jul 2022 17:58 )    Color: Yellow / Appearance: Slightly Turbid / S.022 / pH: x  Gluc: x / Ketone: Negative  / Bili: Negative / Urobili: Negative   Blood: x / Protein: 30 mg/dL / Nitrite: Negative   Leuk Esterase: Large / RBC: 241 /hpf /  /HPF   Sq Epi: x / Non Sq Epi: 1 /hpf / Bacteria: Few        RADIOLOGY & ADDITIONAL TESTS:

## 2022-07-11 NOTE — PROGRESS NOTE ADULT - ASSESSMENT
81 year old female with PMHx of HTN, chronic back and leg pain (wheelchair bound at baseline) presents to ED c/o vomiting x 2 days. Found to have a 5mm L distal stone on CT scan. S/P left ureteral stent, has UTI getting abx, has been having nausea/vomiting GI on board, has persistent hyponatremia    A/P:  Hyponatremia:  Etiology?  urine study is inconclusive   s/p lasix 40mg iv 07/11/22 in the morning   suggests to give another dose of lasix 40mg iv at 6pm and repeat BMP tomorrow morning   Na should not increase more than 6-8meq in 24hrs    Acidosis:  Non-AG  improving   monitor     Hypocalcemia:  sec to low albumin 81 year old female with PMHx of HTN, chronic back and leg pain (wheelchair bound at baseline) presents to ED c/o vomiting x 2 days. Found to have a 5mm L distal stone on CT scan. S/P left ureteral stent, has UTI getting abx, has been having nausea/vomiting GI on board, has persistent hyponatremia    A/P:  Hyponatremia:  Etiology?  clinically hypervolemic  urine study is inconclusive   s/p lasix 40mg iv 07/11/22 in the morning   suggests to give another dose of lasix 40mg iv at 6pm and repeat BMP tomorrow morning   Na should not increase more than 6-8meq in 24hrs    Acidosis:  Non-AG  improving   monitor     Hypocalcemia:  sec to low albumin

## 2022-07-11 NOTE — PROGRESS NOTE ADULT - SUBJECTIVE AND OBJECTIVE BOX
CC: f/u for  uti  Patient reports  nothing intelligible  REVIEW OF SYSTEMS:  All other review of systems negative (Comprehensive ROS)    Antimicrobials Day #  :    Other Medications Reviewed    T(F): 97.5 (22 @ 10:40), Max: 98 (07-10-22 @ 14:14)  HR: 90 (22 @ 10:40)  BP: 129/79 (22 @ 10:40)  RR: 18 (22 @ 10:40)  SpO2: 94% (22 @ 10:40)  Wt(kg): --    PHYSICAL EXAM:  General: alert, no acute distress  Eyes:  anicteric, no conjunctival injection, no discharge  Oropharynx: no lesions or injection 	  Neck: supple, without adenopathy  Lungs: wheezes  to auscultation  Heart: regular rate and rhythm; no murmur, rubs or gallops  Abdomen: soft, nondistended, nontender, without mass or organomegaly  Skin: no lesions  Extremities: no clubbing, cyanosis, . aems and legs with  edema  Neurologic: alert, oriented, moves all extremities    LAB RESULTS:                        12.4   16.42 )-----------( 440      ( 10 Jul 2022 09:01 )             34.0     07-10    124<L>  |  99  |  8   ----------------------------<  77  4.8   |  14<L>  |  0.55    Ca    7.8<L>      10 Jul 2022 09:01  Phos  3.1     07-10  Mg     1.9     07-10        Urinalysis Basic - ( 10 Jul 2022 17:58 )    Color: Yellow / Appearance: Slightly Turbid / S.022 / pH: x  Gluc: x / Ketone: Negative  / Bili: Negative / Urobili: Negative   Blood: x / Protein: 30 mg/dL / Nitrite: Negative   Leuk Esterase: Large / RBC: 241 /hpf /  /HPF   Sq Epi: x / Non Sq Epi: 1 /hpf / Bacteria: Few      MICROBIOLOGY:  RECENT CULTURES:      RADIOLOGY REVIEWED:      < from: CT Abdomen and Pelvis w/ IV Cont (22 @ 13:32) >    ACC: [id number] EXAM:  CT ABDOMEN AND PELVIS IC                          PROCEDURE DATE:  2022          INTERPRETATION:  CLINICAL INFORMATION: Nausea, vomiting x3.    COMPARISON: None.    CONTRAST/COMPLICATIONS:  IV Contrast: Omnipaque 350  90 cc administered   0 cc discarded  Oral Contrast: NONE  Complications: None reported at time of study completion    PROCEDURE:  CT of the Abdomen and Pelvis was performed.  Sagittal and coronal reformats were performed.    FINDINGS:  LOWER CHEST: Smallbilateral pleural effusions. Mitral annular   calcification.    LIVER: Within normal limits.  BILE DUCTS: Normal caliber.  GALLBLADDER: Within normal limits.  SPLEEN: Within normal limits.  PANCREAS: Within normal limits.  ADRENALS: Within normal limits.  KIDNEYS/URETERS: Mild left hydroureteronephrosis secondary to a 5 mm   calculus in the distal ureter. Urothelial enhancement of the left renal   pelvis and ureter. Left renal cortical scarring. Small renal parapelvic   and cortical cysts and subcentimeter hypodensities too small to   characterize.    BLADDER: Urothelial enhancement and subtle perivesicular fat stranding.  REPRODUCTIVE ORGANS: Small calcified exophytic fibroid. Endometrial   thickness measuring 8 mm.    BOWEL: Small hiatal hernia. No bowel obstruction. Appendix not   visualized. Rectum markedly distended with stool.  PERITONEUM: No ascites.  VESSELS: Atherosclerotic changes.  RETROPERITONEUM/LYMPH NODES: No lymphadenopathy.  ABDOMINAL WALL: Within normal limits.  BONES: Degenerative changes.    IMPRESSION:  Mild left hydroureteronephrosis secondary to a 5 mm calculus in the   distal ureter. Diffuse urothelial enhancement of the left renal pelvis   and ureter as well as the bladder with subtle perivesicular fat   stranding, concerning for urinary tract infection.    Rectum markedly distended with stool.    Prominent endometrial thickness measuring 8 mm. Pelvic ultrasound can be   performed for further evaluation.    Small bilateral pleural effusions.    --- End of Report ---      < end of copied text >          Assessment:  Elderly woman admitted with nausea and vomiting, found to have obstructing kidney stone and rectal distention from stool, had a ureteral stent placed and has now finished 10 day of antibiotics. She continues with some leukocytosis but no additional infection is apparent. She now appears volume overloaded.   Plan:  Monitor off antibiotics  for some diuresis  suspect leukocytosis not driven by uncontrolled infection, maybe she has a mpd

## 2022-07-11 NOTE — PROGRESS NOTE ADULT - SUBJECTIVE AND OBJECTIVE BOX
HPI:  81 year old female with PMHx of HTN, chronic back and leg pain (wheelchair bound at baseline) presents to ED c/o vomiting x 2 days. Today patient had wound care appointment for pressure wound to her R calf and vomited again so was brought in by aide. Found to have a 5mm L distal stone on CT scan. Pt denies history of stones, no flank pain, dysuria, urgency, frequency or gross hematuria.  (30 Jun 2022 17:15)        Patient in respiratory Distress Hypoxic       PAST MEDICAL & SURGICAL HISTORY:  Hypertension      Chronic back pain      Wheelchair bound      Left nephrolithiasis          Review of Systems:   CONSTITUTIONAL: No fever, weight loss, or fatigue  EYES: No eye pain, visual disturbances, or discharge  ENMT:  No difficulty hearing, tinnitus, vertigo; No sinus or throat pain  NECK: No pain or stiffness  BREASTS: No pain, masses, or nipple discharge  RESPIRATORY: No cough, wheezing, chills or hemoptysis; No shortness of breath  CARDIOVASCULAR: No chest pain, palpitations, dizziness, or leg swelling  GASTROINTESTINAL: No abdominal or epigastric pain. No nausea, vomiting, or hematemesis; No diarrhea or constipation. No melena or hematochezia.  GENITOURINARY: No dysuria, frequency, hematuria, or incontinence  NEUROLOGICAL: No headaches, memory loss, loss of strength, numbness, or tremors  SKIN: No itching, burning, rashes, or lesions   LYMPH NODES: No enlarged glands  ENDOCRINE: No heat or cold intolerance; No hair loss  MUSCULOSKELETAL: No joint pain or swelling; No muscle, back, or extremity pain  PSYCHIATRIC: No depression, anxiety, mood swings, or difficulty sleeping  HEME/LYMPH: No easy bruising, or bleeding gums  ALLERY AND IMMUNOLOGIC: No hives or eczema    Allergies    No Known Allergies    Intolerances        Social History:     FAMILY HISTORY:    T(C): 36.3 (07-11-22 @ 22:13), Max: 36.4 (07-11-22 @ 14:28)  HR: 100 (07-11-22 @ 22:13) (95  RR: 18 (07-11-22 @ 22:13) (18 - 18)  SpO2: 95% (07-11-22 @ 22:13) (95% - 97%)      MEDICATIONS  (STANDING):  famotidine Injectable 20 milliGRAM(s) IV Push two times a day  heparin   Injectable 5000 Unit(s) SubCutaneous every 8 hours  metoclopramide 5 milliGRAM(s) Oral every 6 hours  polyethylene glycol 3350 17 Gram(s) Oral daily  senna 2 Tablet(s) Oral at bedtime  sucralfate 1 Gram(s) Oral four times a day  tamsulosin 0.4 milliGRAM(s) Oral daily    MEDICATIONS  (PRN):  acetaminophen     Tablet .. 650 milliGRAM(s) Oral every 6 hours PRN Temp greater or equal to 38C (100.4F), Mild Pain (1 - 3)  benzocaine 15 mG/menthol 3.6 mG Lozenge 1 Lozenge Oral three times a day PRN Sore Throat  guaiFENesin  milliGRAM(s) Oral every 12 hours PRN Cough  ondansetron Injectable 4 milliGRAM(s) IV Push every 6 hours PRN Nausea and/or Vomiting      PHYSICAL EXAM:  GENERAL: NAD, well-developed  HEAD:  Atraumatic, Normocephalic  EYES: EOMI, PERRLA, conjunctiva and sclera clear  NECK: Supple, No JVD  CHEST/LUNG: Clear to auscultation bilaterally; No wheeze  HEART: Regular rate and rhythm; No murmurs, rubs, or gallops  ABDOMEN: Soft, Nontender, Nondistended; Bowel sounds present  EXTREMITIES:  2+ Peripheral Pulses, No clubbing, cyanosis, or edema  PSYCH: AAOx3  NEUROLOGY: non-focal  SKIN: No rashes or lesions                                                        12.0   14.66 )-----------( 619      ( 11 Jul 2022 12:19 )             33.9               125|97|9<59  3.9|18|0.58  8.1,1.8,2.6  07-11 @ 12:19

## 2022-07-11 NOTE — PROGRESS NOTE ADULT - ASSESSMENT
nausea/vomiting    cont diet as tolerated  cont with reglan 5mg  q6h  cont proton pump inhibitor  cont carafate  diet as tolerated  dc planning as per primary   will follow

## 2022-07-11 NOTE — PROGRESS NOTE ADULT - ASSESSMENT
81 year old female with PMHx of HTN, chronic back and leg pain (wheelchair bound at baseline) presents to ED c/o vomiting x 2 days.   Found to have a 5mm L distal stone on CT scan. Pt denies history of stones, no flank pain, dysuria, urgency, frequency or gross hematuria.          Acute Hypoxic respiratory Failure IV Lasix   Follow up Echo     Cards eval called   Follow up recs     Severe Electrolyte Imbalance Supplement as needed  Moniotr BMP     Hyponatremia SIADH  dehydration   Free water restriction   Renal following        Nausea vomiting   Dysphagia     GI consulted   Recommend Reglan Zofran prn   Carafate         Nephrolithiasis s/p stent placement   Patient afebrile   Cultures shows Emterococci completed abx   ID consulted   Urology following     HTN Controlled off meds     Chronic wounds wound care consult appreciated

## 2022-07-11 NOTE — PROGRESS NOTE ADULT - SUBJECTIVE AND OBJECTIVE BOX
[facility]   MD ZANE SERRATO MD, DO KELLY CARIAS NP    TEL:  OFFICE: [phone]    From 5pm-7am Answering Service [phone]    -- RENAL FOLLOW UP NOTE ---Date of Service 07-11-22 @ 14:33    Patient is a 81y old  Female who presents with a chief complaint of vomiting (11 Jul 2022 11:30)      Patient seen and examined at bedside. No chest pain/sob    VITALS:  T(F): 97.6 (07-11-22 @ 14:28), Max: 97.6 (07-10-22 @ 22:05)  HR: 95 (07-11-22 @ 14:28)  BP: 131/75 (07-11-22 @ 14:28)  RR: 18 (07-11-22 @ 14:28)  SpO2: 95% (07-11-22 @ 14:28)  Wt(kg): --    07-10 @ 07:01  -  07-11 @ 07:00  --------------------------------------------------------  IN: 1520 mL / OUT: 0 mL / NET: 1520 mL    07-11 @ 07:01  -  07-11 @ 14:33  --------------------------------------------------------  IN: 60 mL / OUT: 0 mL / NET: 60 mL          PHYSICAL EXAM:  Constitutional: NAD  Neck: No JVD  Respiratory:  wheezes,  Cardiovascular: S1, S2, RRR  Gastrointestinal: BS+, soft, NT/ND  Extremities: + peripheral edema    Hospital Medications:   MEDICATIONS  (STANDING):  famotidine Injectable 20 milliGRAM(s) IV Push two times a day  heparin   Injectable 5000 Unit(s) SubCutaneous every 8 hours  metoclopramide 5 milliGRAM(s) Oral every 6 hours  polyethylene glycol 3350 17 Gram(s) Oral daily  senna 2 Tablet(s) Oral at bedtime  sucralfate 1 Gram(s) Oral four times a day  tamsulosin 0.4 milliGRAM(s) Oral daily      LABS:  07-11    125<L>  |  97  |  9   ----------------------------<  59<L>  3.9   |  18<L>  |  0.58    Ca    8.1<L>      11 Jul 2022 12:19  Phos  2.6     07-11  Mg     1.8     07-11      Creatinine Trend: 0.58 <--, 0.55 <--, 0.51 <--, 0.48 <--, 0.49 <--, 0.51 <--, 0.52 <--    Phosphorus Level, Serum: 2.6 mg/dL (07-11 @ 12:19)                              12.0   14.66 )-----------( 619      ( 11 Jul 2022 12:19 )             33.9     Urine Studies:  Urinalysis - [07-10-22 @ 17:58]      Color Yellow / Appearance Slightly Turbid / SG 1.022 / pH 6.0      Gluc Negative / Ketone Negative  / Bili Negative / Urobili Negative       Blood Large / Protein 30 mg/dL / Leuk Est Large / Nitrite Negative       /  / Hyaline 0 / Gran  / Sq Epi  / Non Sq Epi 1 / Bacteria Few    Urine Sodium 34      [07-10-22 @ 17:59]  Urine Osmolality 320      [07-10-22 @ 17:58]          RADIOLOGY & ADDITIONAL STUDIES:

## 2022-07-12 NOTE — CHART NOTE - NSCHARTNOTEFT_GEN_A_CORE
CC: Hyponatremia    Patient was given Tolvaptan @1847 this evening for Na level of 125.  BMP was rechecked and drawn @2148 with Na remaining at 125.  Discussed with on-call Nephrologist Dr. Barnett, will recheck CC: Hyponatremia    Patient was given Tolvaptan @1847 this evening for Na level of 125.  BMP was rechecked and drawn @2148 with Na remaining at 125.  Discussed with on-call Nephrologist Dr. Barnett, will recheck Na in AM as blood draw was close to administration time and also check LFTs in AM.  Will endorse to AM team, Attending to follow.    Lior Vela PA-C  Dept. of Medicine  #[id number]

## 2022-07-12 NOTE — PROGRESS NOTE ADULT - SUBJECTIVE AND OBJECTIVE BOX
CC: f/u for leukocytosis    Patient reports; she is sleepy, appears comfortable and offers no complaints    REVIEW OF SYSTEMS:  All other review of systems negative (Comprehensive ROS)    Antimicrobials Day #  :off    Other Medications Reviewed  MEDICATIONS  (STANDING):  famotidine Injectable 20 milliGRAM(s) IV Push two times a day  heparin   Injectable 5000 Unit(s) SubCutaneous every 8 hours  metoclopramide 5 milliGRAM(s) Oral every 6 hours  polyethylene glycol 3350 17 Gram(s) Oral daily  senna 2 Tablet(s) Oral at bedtime  sucralfate 1 Gram(s) Oral four times a day  tamsulosin 0.4 milliGRAM(s) Oral daily      T(F): 97.6 (22 @ 06:15), Max: 97.6 (22 @ 14:28)  HR: 101 (22 @ 06:15)  BP: 101/66 (22 @ 06:15)  RR: 18 (22 @ 06:15)  SpO2: 95% (22 @ 06:15)  Wt(kg): --    PHYSICAL EXAM:  General: alert, no acute distress, sleepy  Eyes:  anicteric, no conjunctival injection, no discharge  Oropharynx: no lesions or injection 	  Neck: supple, without adenopathy  Lungs: clear to auscultation  Heart: regular rate and rhythm; no murmur, rubs or gallops  Abdomen: soft, nondistended, nontender, without mass or organomegaly  Skin: no lesions  Extremities: no clubbing, cyanosis, or edema  Neurologic: alert, marginally cooperative,, moves all extremities    LAB RESULTS:                        10.8   16.52 )-----------( 404      ( 2022 10:16 )             29.5     07-11    125<L>  |  97  |  9   ----------------------------<  59<L>  3.9   |  18<L>  |  0.58    Ca    8.1<L>      2022 12:19  Phos  2.6     07-11  Mg     1.8     07-11        Urinalysis Basic - ( 10 Jul 2022 17:58 )    Color: Yellow / Appearance: Slightly Turbid / S.022 / pH: x  Gluc: x / Ketone: Negative  / Bili: Negative / Urobili: Negative   Blood: x / Protein: 30 mg/dL / Nitrite: Negative   Leuk Esterase: Large / RBC: 241 /hpf /  /HPF   Sq Epi: x / Non Sq Epi: 1 /hpf / Bacteria: Few      MICROBIOLOGY:  RECENT CULTURES:      RADIOLOGY REVIEWED:    < from: Xray Chest 1 View- PORTABLE-Urgent (Xray Chest 1 View- PORTABLE-Urgent .) (22 @ 11:39) >  IMPRESSION:    Bilateral pleural effusions. Increased prominence of the pulmonary   vasculature and perihilar haze, which may represent pulmonary edema in   the appropriate clinical setting.    --- End of Report ---    < end of copied text >

## 2022-07-12 NOTE — PROGRESS NOTE ADULT - ASSESSMENT
81 year old female with PMHx of HTN, chronic back and leg pain (wheelchair bound at baseline) presents to ED c/o vomiting x 2 days.   Found to have a 5mm L distal stone on CT scan. Pt denies history of stones, no flank pain, dysuria, urgency, frequency or gross hematuria.          Acute Hypoxic respiratory Failure IV Lasix   Follow up Echo     Cards eval appreciated   Follow up echo   CTchest reviewed GABRIELE opacitis with large pleural effusions     Pleural Effusions Pulm consult   Follow up recs     Pneumonia Aspiration Start patient on Zosyn   Follow up Blood cx sent yesterday     re Electrolyte Imbalance Supplement as needed  Moniotr BMP     Hyponatremia SIADH  dehydration   Free water restriction   Renal following        Nausea vomiting   Dysphagia     GI consulted   Recommend Reglan Zofran prn   Carafate         Nephrolithiasis s/p stent placement   Patient afebrile   Cultures shows Emterococci completed abx   ID following       HTN Controlled off meds     Chronic wounds wound care consult appreciated

## 2022-07-12 NOTE — PROGRESS NOTE ADULT - SUBJECTIVE AND OBJECTIVE BOX
[facility]  Yannick Castillo PA-C  [facility] [address] [phone]      INTERVAL HPI/OVERNIGHT EVENTS:  pt seen and examined, resting comfortably no new events      MEDICATIONS  (STANDING):  famotidine Injectable 20 milliGRAM(s) IV Push two times a day  heparin   Injectable 5000 Unit(s) SubCutaneous every 8 hours  metoclopramide 5 milliGRAM(s) Oral every 6 hours  polyethylene glycol 3350 17 Gram(s) Oral daily  senna 2 Tablet(s) Oral at bedtime  sodium bicarbonate  Infusion 0.23 mEq/kG/Hr (250 mL/Hr) IV Continuous <Continuous>  sucralfate 1 Gram(s) Oral four times a day  tamsulosin 0.4 milliGRAM(s) Oral daily    MEDICATIONS  (PRN):  acetaminophen     Tablet .. 650 milliGRAM(s) Oral every 6 hours PRN Temp greater or equal to 38C (100.4F), Mild Pain (1 - 3)  benzocaine 15 mG/menthol 3.6 mG Lozenge 1 Lozenge Oral three times a day PRN Sore Throat  guaiFENesin  milliGRAM(s) Oral every 12 hours PRN Cough  ondansetron Injectable 4 milliGRAM(s) IV Push every 6 hours PRN Nausea and/or Vomiting      Allergies    No Known Allergies    Intolerances        ROS:   General:  No wt loss, fevers, chills, night sweats, fatigue,   Eyes:  Good vision, no reported pain  ENT:  No sore throat, pain, runny nose, dysphagia  CV:  No pain, palpitations, hypo/hypertension  Resp:  No dyspnea, cough, tachypnea, wheezing  GI:  No pain, No nausea, No vomiting, No diarrhea, No constipation, No weight loss, No fever, No pruritis, No rectal bleeding, No tarry stools, No dysphagia,  :  No pain, bleeding, incontinence, nocturia  Muscle:  No pain, weakness  Neuro:  No weakness, tingling, memory problems  Psych:  No fatigue, insomnia, mood problems, depression  Endocrine:  No polyuria, polydipsia, cold/heat intolerance  Heme:  No petechiae, ecchymosis, easy bruisability  Skin:  No rash, tattoos, scars, edema      PHYSICAL EXAM:   Vital Signs Last 24 Hrs  T(C): 36.4 (2022 06:15), Max: 36.4 (2022 10:40)  T(F): 97.6 (2022 06:15), Max: 97.6 (2022 14:28)  HR: 101 (2022 06:15) (90 - 101)  BP: 101/66 (2022 06:15) (101/66 - 131/75)  BP(mean): --  RR: 18 (2022 06:15) (18 - 18)  SpO2: 95% (2022 06:15) (94% - 97%)    Parameters below as of 2022 01:30  Patient On (Oxygen Delivery Method): room air    Daily     Daily     GENERAL:  Appears stated age,   HEENT:  NC/AT,    CHEST:  Full & symmetric excursion,   HEART:  Regular rhythm,  ABDOMEN:  Soft, non-tender, non-distended,  EXTEREMITIES:  no cyanosis  SKIN:  No rash  NEURO:  Alert,       LABS:                        12.0   14.66 )-----------( 619      ( 2022 12:19 )             33.9   07-11    125<L>  |  97  |  9   ----------------------------<  59<L>  3.9   |  18<L>  |  0.58    Ca    8.1<L>      2022 12:19  Phos  2.6     07-11  Mg     1.8     07-11        Urinalysis Basic - ( 10 Jul 2022 17:58 )    Color: Yellow / Appearance: Slightly Turbid / S.022 / pH: x  Gluc: x / Ketone: Negative  / Bili: Negative / Urobili: Negative   Blood: x / Protein: 30 mg/dL / Nitrite: Negative   Leuk Esterase: Large / RBC: 241 /hpf /  /HPF   Sq Epi: x / Non Sq Epi: 1 /hpf / Bacteria: Few        RADIOLOGY & ADDITIONAL TESTS:

## 2022-07-12 NOTE — PROVIDER CONTACT NOTE (HYPOGLYCEMIA EVENT) - NS PROVIDER CONTACT NOTE-TREATMENT-HYPO
4 oz Fruit Juice (Specify quantity, date/time)/Dextrose 50% 12.5 Grams IV Push/Dextrose 50% 25 Grams IV Push

## 2022-07-12 NOTE — CONSULT NOTE ADULT - SUBJECTIVE AND OBJECTIVE BOX
[facility] - Anderson Hernandez, Kenny, Reddy, Rocio, Lonnie Martinez  Office Number: [phone]    Initial Consult Note    CHIEF COMPLAINT: Patient is a 81y old  Female who presents with a chief complaint of vomiting (11 Jul 2022 17:54)      HPI:  81 year old female with PMHx of HTN, chronic back and leg pain (wheelchair bound at baseline) presents to ED c/o vomiting x 2 days. Today patient had wound care appointment for pressure wound to her R calf and vomited again so was brought in by aide. Found to have a 5mm L distal stone on CT scan. Pt denies history of stones, no flank pain, dysuria, urgency, frequency or gross hematuria.  (30 Jun 2022 17:15)  She was found to have an obstructing kidney stone and rectal distention from stool, had a ureteral stent placed and has now finished 10 day of antibiotics. She continues with some leukocytosis but no additional infection is apparent. She now appears volume overloaded.   She got IV lasix x 2 yeterday  CXR with congestion and vida effusions        PAST MEDICAL & SURGICAL HISTORY:  Hypertension      Chronic back pain      Wheelchair bound      Left nephrolithiasis          SOCIAL HISTORY:  No tobacco, ethanol, or drug abuse.    FAMILY HISTORY:    No family history of acute MI or sudden cardiac death.    MEDICATIONS  (STANDING):  famotidine Injectable 20 milliGRAM(s) IV Push two times a day  heparin   Injectable 5000 Unit(s) SubCutaneous every 8 hours  metoclopramide 5 milliGRAM(s) Oral every 6 hours  polyethylene glycol 3350 17 Gram(s) Oral daily  senna 2 Tablet(s) Oral at bedtime  sucralfate 1 Gram(s) Oral four times a day  tamsulosin 0.4 milliGRAM(s) Oral daily    MEDICATIONS  (PRN):  acetaminophen     Tablet .. 650 milliGRAM(s) Oral every 6 hours PRN Temp greater or equal to 38C (100.4F), Mild Pain (1 - 3)  benzocaine 15 mG/menthol 3.6 mG Lozenge 1 Lozenge Oral three times a day PRN Sore Throat  guaiFENesin  milliGRAM(s) Oral every 12 hours PRN Cough  ondansetron Injectable 4 milliGRAM(s) IV Push every 6 hours PRN Nausea and/or Vomiting      Allergies    No Known Allergies    Intolerances        REVIEW OF SYSTEMS:    CONSTITUTIONAL: No weakness, fevers or chills  EYES/ENT: No visual changes;  No vertigo or throat pain   NECK: No pain or stiffness  RESPIRATORY: No cough, wheezing, hemoptysis; No shortness of breath  CARDIOVASCULAR: No chest pain or palpitations  GASTROINTESTINAL: No abdominal pain. No nausea, vomiting, or hematemesis; No diarrhea or constipation. No melena or hematochezia.  GENITOURINARY: No dysuria, frequency or hematuria  NEUROLOGICAL: No numbness or weakness  SKIN: No itching or rash  All other review of systems is negative unless indicated above    VITAL SIGNS:   Vital Signs Last 24 Hrs  T(C): 36.4 (12 Jul 2022 06:15), Max: 36.4 (11 Jul 2022 10:40)  T(F): 97.6 (12 Jul 2022 06:15), Max: 97.6 (11 Jul 2022 14:28)  HR: 101 (12 Jul 2022 06:15) (90 - 101)  BP: 101/66 (12 Jul 2022 06:15) (101/66 - 131/75)  BP(mean): --  RR: 18 (12 Jul 2022 06:15) (18 - 18)  SpO2: 95% (12 Jul 2022 06:15) (94% - 97%)    Parameters below as of 12 Jul 2022 01:30  Patient On (Oxygen Delivery Method): room air        I&O's Summary    11 Jul 2022 07:01  -  12 Jul 2022 07:00  --------------------------------------------------------  IN: 60 mL / OUT: 0 mL / NET: 60 mL        On Exam:    Constitutional: NAD, alert and oriented x 3  Lungs:  Non-labored, breath sounds are decreased bilaterally, No wheezing, rales or rhonchi  Cardiovascular: RRR.  S1 and S2 positive.  + syst murmur at ru/lusb, no rubs, gallops or clicks  Gastrointestinal: Bowel Sounds present, soft, nontender.   Lymph: mild peripheral edema. No cervical lymphadenopathy.  Neurological: Alert, no focal deficits  Skin: No rashes or ulcers   Psych:  Mood & affect appropriate.    LABS: All Labs Reviewed:                        12.0   14.66 )-----------( 619      ( 11 Jul 2022 12:19 )             33.9                         12.4   16.42 )-----------( 440      ( 10 Jul 2022 09:01 )             34.0                         12.2   16.84 )-----------( 395      ( 09 Jul 2022 18:36 )             33.0     11 Jul 2022 12:19    125    |  97     |  9      ----------------------------<  59     3.9     |  18     |  0.58   10 Jul 2022 09:01    124    |  99     |  8      ----------------------------<  77     4.8     |  14     |  0.55   09 Jul 2022 18:36    123    |  98     |  8      ----------------------------<  87     3.7     |  15     |  0.51     Ca    8.1        11 Jul 2022 12:19  Ca    7.8        10 Jul 2022 09:01  Ca    7.7        09 Jul 2022 18:36  Phos  2.6       11 Jul 2022 12:19  Phos  3.1       10 Jul 2022 09:01  Phos  2.9       09 Jul 2022 18:36  Mg     1.8       11 Jul 2022 12:19  Mg     1.9       10 Jul 2022 09:01  Mg     2.1       09 Jul 2022 18:36            Blood Culture:     07-11 @ 12:19  TSH: 6.11      RADIOLOGY:    EKG: sr, apcs, lad, nsst abn

## 2022-07-12 NOTE — PROGRESS NOTE ADULT - ASSESSMENT
81 year old female with PMHx of HTN, chronic back and leg pain (wheelchair bound at baseline) presents to ED c/o vomiting x 2 days. Found to have a 5mm L distal stone on CT scan. S/P left ureteral stent, has UTI getting abx, has been having nausea/vomiting GI on board, has persistent hyponatremia    A/P:  Hyponatremia:  Etiology?  clinically hypervolemic  diuretics per cardiology    urine study is inconclusive   suggests to give tolvaptan 15mg one dose today, repeat BMP at 6pm and call nephrology with the result   discontinue fluid restriction   Na should not increase more than 6-8meq in 24hrs  monitor BMP closely     Acidosis:  Non-AG  improving   monitor     Hypocalcemia:  sec to low albumin

## 2022-07-12 NOTE — PROGRESS NOTE ADULT - SUBJECTIVE AND OBJECTIVE BOX
[facility]   MD ZANE SERRATO MD, DO KELLY CARIAS NP    TEL:  OFFICE: [phone]    From 5pm-7am Answering Service [phone]    -- RENAL FOLLOW UP NOTE ---Date of Service 07-12-22 @ 13:51    Patient is a 81y old  Female who presents with a chief complaint of vomiting (12 Jul 2022 10:29)      Patient seen and examined at bedside. No chest pain/sob    VITALS:  T(F): 97.9 (07-12-22 @ 13:05), Max: 97.9 (07-12-22 @ 13:05)  HR: 99 (07-12-22 @ 13:05)  BP: 90/72 (07-12-22 @ 13:05)  RR: 18 (07-12-22 @ 13:05)  SpO2: 98% (07-12-22 @ 13:05)  Wt(kg): --    07-11 @ 07:01  -  07-12 @ 07:00  --------------------------------------------------------  IN: 300 mL / OUT: 0 mL / NET: 300 mL    07-12 @ 07:01  -  07-12 @ 13:51  --------------------------------------------------------  IN: 310 mL / OUT: 0 mL / NET: 310 mL          PHYSICAL EXAM:  Constitutional: NAD  Neck: No JVD  Respiratory: CTAB, no wheezes, rales or rhonchi  Cardiovascular: S1, S2, RRR  Gastrointestinal: BS+, soft, NT/ND  Extremities: + peripheral edema    Hospital Medications:   MEDICATIONS  (STANDING):  dextrose 5%. 1000 milliLiter(s) (50 mL/Hr) IV Continuous <Continuous>  dextrose 5%. 1000 milliLiter(s) (100 mL/Hr) IV Continuous <Continuous>  dextrose 50% Injectable 25 Gram(s) IV Push once  dextrose 50% Injectable 12.5 Gram(s) IV Push once  dextrose 50% Injectable 25 Gram(s) IV Push once  famotidine Injectable 20 milliGRAM(s) IV Push two times a day  glucagon  Injectable 1 milliGRAM(s) IntraMuscular once  heparin   Injectable 5000 Unit(s) SubCutaneous every 8 hours  metoclopramide 5 milliGRAM(s) Oral every 6 hours  polyethylene glycol 3350 17 Gram(s) Oral daily  senna 2 Tablet(s) Oral at bedtime  sucralfate 1 Gram(s) Oral four times a day  tamsulosin 0.4 milliGRAM(s) Oral daily      LABS:  07-12    125<L>  |  98  |  9   ----------------------------<  49<LL>  3.6   |  15<L>  |  0.62    Ca    8.1<L>      12 Jul 2022 10:16  Phos  2.6     07-11  Mg     1.8     07-11      Creatinine Trend: 0.62 <--, 0.58 <--, 0.55 <--, 0.51 <--, 0.48 <--, 0.49 <--, 0.51 <--, 0.52 <--                                10.8   16.52 )-----------( 404      ( 12 Jul 2022 10:16 )             29.5     Urine Studies:  Urinalysis - [07-10-22 @ 17:58]      Color Yellow / Appearance Slightly Turbid / SG 1.022 / pH 6.0      Gluc Negative / Ketone Negative  / Bili Negative / Urobili Negative       Blood Large / Protein 30 mg/dL / Leuk Est Large / Nitrite Negative       /  / Hyaline 0 / Gran  / Sq Epi  / Non Sq Epi 1 / Bacteria Few    Urine Sodium 34      [07-10-22 @ 17:59]  Urine Osmolality 320      [07-10-22 @ 17:58]    TSH 6.11      [07-11-22 @ 12:19]        RADIOLOGY & ADDITIONAL STUDIES:

## 2022-07-12 NOTE — PROGRESS NOTE ADULT - ASSESSMENT
81y female with PMHx of HTN, chronic back and leg pain (wheelchair bound at baseline) presents to ED c/o vomiting x 2 days. Today patient had wound care appointment for pressure wound to her R calf and vomited again so was brought in by aide. Found to have a 5mm L distal stone on CT scan. Pt denies history of stones, no flank pain, dysuria, urgency, frequency or gross hematuria. On 6/30 the patient underwent a cysto procedure with left stent placement. Urine culture was sent and now reporting growing Klebsiella and Enterococcus in the urine culture. ID consulted for further antibiotics management.  Reviewed blood cultures are reported to be no growth to date.   She appears stable from ID viewpoint.  She completed 10 days of zosyn on 7/10  Stable off antibiotics but has ongoing leukocytosis.  Hemodynamically stable  Follow conservatively  Low threshold to repeat blood and urine cultures 81y female with PMHx of HTN, chronic back and leg pain (wheelchair bound at baseline) presents to ED c/o vomiting x 2 days. Today patient had wound care appointment for pressure wound to her R calf and vomited again so was brought in by aide. Found to have a 5mm L distal stone on CT scan. Pt denies history of stones, no flank pain, dysuria, urgency, frequency or gross hematuria. On 6/30 the patient underwent a cysto procedure with left stent placement. Urine culture was sent and now reporting growing Klebsiella and Enterococcus in the urine culture. ID consulted for further antibiotics management.  Reviewed blood cultures are reported to be no growth to date.   She appears stable from ID viewpoint.  She completed 10 days of zosyn on 7/10  Stable off antibiotics but has ongoing leukocytosis.  Hemodynamically stable  Being diuresed by cardiology  local wound care for leg wounds  Follow conservatively  Low threshold to repeat blood and urine cultures

## 2022-07-12 NOTE — CONSULT NOTE ADULT - ASSESSMENT
81 year old female with PMHx of HTN, chronic back and leg pain (wheelchair bound at baseline), who presented with an obstructing kidney stone and rectal distention from stool, had a ureteral stent placed and has now finished 10 day of antibiotics.   More recently, she has been borderline hypoxic, and there was suggestion of volume overload, based on her exam and CXR.    - appears volume overloaded on exam, and CXR from 7/11 with vida effusions and pulmonary congestion  - agree with IV Lasix, and would give additional dose today  - await echocardiogram   - cont to trend creatinine and sodium closely  - ekgs have documented paf, though most likely a sr/st with with apcs. She does remain borderline tachycardic per flow sheets, and if continues, would put her on telemetry monitoring.  - please add on a bnp to next blood draw  - renal follow up appreciated  - further evaluation will depend on clinical course  - will follow with you

## 2022-07-12 NOTE — PROVIDER CONTACT NOTE (HYPOGLYCEMIA EVENT) - NS PROVIDER CONTACT BACKGROUND-HYPO
Age: 81y    Gender: Female    POCT Blood Glucose:  97 mg/dL (07-01-22 @ 10:30) - pt could not tolerate more PO glucose (vomited last apple juice), started on D51/2NS   85 mg/dL (07-01-22 @ 10:00)  66 mg/dL (07-01-22 @ 09:22)  64 mg/dL (07-01-22 @ 08:58) - this was repeat PoCT from [facility] (BG 45)      eMAR:dextrose Oral Gel   15 Gram(s) Oral (07-01-22 @ 10:05) - pt given 4 cups of apple juice and 3 glucose gels before pt could not tolerate anymore PO. PA Scheier aware and OK w/ 97 (in constant communication w/ PA throughout hypoglycemic episode via Teams)    
Age: 81y    Gender: Female    POCT Blood Glucose:  206 mg/dL (07-12-22 @ 13:18)  88 mg/dL (07-12-22 @ 12:57)  94 mg/dL (07-12-22 @ 12:35)  84 mg/dL (07-12-22 @ 12:18)  62 mg/dL (07-12-22 @ 11:41)  56 mg/dL (07-12-22 @ 11:39)      eMAR:dextrose 50% Injectable   12.5 Gram(s) IV Push (07-12-22 @ 12:04)    dextrose 50% Injectable   25 Gram(s) IV Push (07-12-22 @ 13:06)

## 2022-07-12 NOTE — PROGRESS NOTE ADULT - SUBJECTIVE AND OBJECTIVE BOX
HPI:  81 year old female with PMHx of HTN, chronic back and leg pain (wheelchair bound at baseline) presents to ED c/o vomiting x 2 days. Today patient had wound care appointment for pressure wound to her R calf and vomited again so was brought in by aide. Found to have a 5mm L distal stone on CT scan. Pt denies history of stones, no flank pain, dysuria, urgency, frequency or gross hematuria.  (30 Jun 2022 17:15)        Patient in respiratory Distress Hypoxic       PAST MEDICAL & SURGICAL HISTORY:  Hypertension      Chronic back pain      Wheelchair bound      Left nephrolithiasis          Review of Systems:   CONSTITUTIONAL: No fever, weight loss, or fatigue  EYES: No eye pain, visual disturbances, or discharge  ENMT:  No difficulty hearing, tinnitus, vertigo; No sinus or throat pain  NECK: No pain or stiffness  BREASTS: No pain, masses, or nipple discharge  RESPIRATORY: No cough, wheezing, chills or hemoptysis; No shortness of breath  CARDIOVASCULAR: No chest pain, palpitations, dizziness, or leg swelling  GASTROINTESTINAL: No abdominal or epigastric pain. No nausea, vomiting, or hematemesis; No diarrhea or constipation. No melena or hematochezia.  GENITOURINARY: No dysuria, frequency, hematuria, or incontinence  NEUROLOGICAL: No headaches, memory loss, loss of strength, numbness, or tremors  SKIN: No itching, burning, rashes, or lesions   LYMPH NODES: No enlarged glands  ENDOCRINE: No heat or cold intolerance; No hair loss  MUSCULOSKELETAL: No joint pain or swelling; No muscle, back, or extremity pain  PSYCHIATRIC: No depression, anxiety, mood swings, or difficulty sleeping  HEME/LYMPH: No easy bruising, or bleeding gums  ALLERY AND IMMUNOLOGIC: No hives or eczema    Allergies    No Known Allergies    Intolerances        Social History:     FAMILY HISTORY:    T(C): 36.3 (07-12-22 @ 21:12), Max: 36.6 (07-12-22 @ 13:05)  HR: 97 (07-12-22 @ 21:12) (97 - 99)  BP: 113/78 (07-12-22 @ 21:12) (90/72 - 117/75)  RR: 18 (07-12-22 @ 21:12) (18 - 18)  SpO2: 97% (07-12-22 @ 21:12) (97% - 98%)      MEDICATIONS  (STANDING):  dextrose 5%. 1000 milliLiter(s) (50 mL/Hr) IV Continuous <Continuous>  dextrose 5%. 1000 milliLiter(s) (100 mL/Hr) IV Continuous <Continuous>  dextrose 50% Injectable 25 Gram(s) IV Push once  dextrose 50% Injectable 12.5 Gram(s) IV Push once  dextrose 50% Injectable 25 Gram(s) IV Push once  famotidine Injectable 20 milliGRAM(s) IV Push two times a day  glucagon  Injectable 1 milliGRAM(s) IntraMuscular once  heparin   Injectable 5000 Unit(s) SubCutaneous every 8 hours  metoclopramide 5 milliGRAM(s) Oral every 6 hours  piperacillin/tazobactam IVPB. 3.375 Gram(s) IV Intermittent once  piperacillin/tazobactam IVPB.. 3.375 Gram(s) IV Intermittent every 8 hours  polyethylene glycol 3350 17 Gram(s) Oral daily  senna 2 Tablet(s) Oral at bedtime  sucralfate 1 Gram(s) Oral four times a day  tamsulosin 0.4 milliGRAM(s) Oral daily    MEDICATIONS  (PRN):  acetaminophen     Tablet .. 650 milliGRAM(s) Oral every 6 hours PRN Temp greater or equal to 38C (100.4F), Mild Pain (1 - 3)  benzocaine 15 mG/menthol 3.6 mG Lozenge 1 Lozenge Oral three times a day PRN Sore Throat  dextrose Oral Gel 15 Gram(s) Oral once PRN Blood Glucose LESS THAN 70 milliGRAM(s)/deciliter  guaiFENesin  milliGRAM(s) Oral every 12 hours PRN Cough  ondansetron Injectable 4 milliGRAM(s) IV Push every 6 hours PRN Nausea and/or Vomiting      PHYSICAL EXAM:  GENERAL: NAD, well-developed  HEAD:  Atraumatic, Normocephalic  EYES: EOMI, PERRLA, conjunctiva and sclera clear  NECK: Supple, No JVD  CHEST/LUNG: Clear to auscultation bilaterally; No wheeze  HEART: Regular rate and rhythm; No murmurs, rubs, or gallops  ABDOMEN: Soft, Nontender, Nondistended; Bowel sounds present  EXTREMITIES:  2+ Peripheral Pulses, No clubbing, cyanosis, or edema  PSYCH: AAOx3  NEUROLOGY: non-focal  SKIN: No rashes or lesions                                                       10.8   16.52 )-----------( 404      ( 12 Jul 2022 10:16 )             29.5               125|96|10<88  3.4|19|0.69  8.3,--,--  07-12 @ 21:48  125|98|9<49  3.6|15|0.62  8.1,--,--  07-12 @ 10:16

## 2022-07-13 NOTE — CONSULT NOTE ADULT - SUBJECTIVE AND OBJECTIVE BOX
HPI:  81 year old female with PMHx of HTN, chronic back and leg pain (wheelchair bound at baseline) presents to ED c/o vomiting x 2 days. Today patient had wound care appointment for pressure wound to her R calf and vomited again so was brought in by aide. Found to have a 5mm L distal stone on CT scan. Pt denies history of stones, no flank pain, dysuria, urgency, frequency or gross hematuria.  (30 Jun 2022 17:15)    PERTINENT PM/SXH:   Hypertension    Chronic back pain    Wheelchair bound    Left nephrolithiasis        FAMILY HISTORY:    Family Hx substance abuse [ ]yes [ ]no  ITEMS NOT CHECKED ARE NOT PRESENT    SOCIAL HISTORY:   Significant other/partner[ ]  Children[ ]  Congregational/Spirituality:  Substance hx:  [ ]   Tobacco hx:  [ ]   Alcohol hx: [ ]   Home Opioid hx:  [ ] I-Stop Reference No:  Living Situation: [ X]Home  [ ]Long term care  [ ]Rehab [ ]Other    ADVANCE DIRECTIVES:    DNR/MOLST  [ X]  Living Will  [ ]   DECISION MAKER(s):  [X ] Health Care Proxy(s)  [ ] Surrogate(s)  [ ] Guardian           Name(s): Phone Number(s):  Chela Wei  :  sister   BASELINE (I)ADL(s) (prior to admission):  [place]: [x ]Total  [ ] Moderate [ ]Dependent    Allergies    No Known Allergies    Intolerances    MEDICATIONS  (STANDING):  dextrose 5%. 1000 milliLiter(s) (50 mL/Hr) IV Continuous <Continuous>  dextrose 5%. 1000 milliLiter(s) (100 mL/Hr) IV Continuous <Continuous>  dextrose 50% Injectable 25 Gram(s) IV Push once  dextrose 50% Injectable 12.5 Gram(s) IV Push once  dextrose 50% Injectable 25 Gram(s) IV Push once  famotidine Injectable 20 milliGRAM(s) IV Push two times a day  furosemide   Injectable 40 milliGRAM(s) IV Push once  glucagon  Injectable 1 milliGRAM(s) IntraMuscular once  heparin   Injectable 5000 Unit(s) SubCutaneous every 8 hours  metoclopramide 5 milliGRAM(s) Oral every 6 hours  polyethylene glycol 3350 17 Gram(s) Oral daily  senna 2 Tablet(s) Oral at bedtime  sodium bicarbonate 650 milliGRAM(s) Oral three times a day  sucralfate 1 Gram(s) Oral four times a day  tamsulosin 0.4 milliGRAM(s) Oral daily    MEDICATIONS  (PRN):  acetaminophen     Tablet .. 650 milliGRAM(s) Oral every 6 hours PRN Temp greater or equal to 38C (100.4F), Mild Pain (1 - 3)  benzocaine 15 mG/menthol 3.6 mG Lozenge 1 Lozenge Oral three times a day PRN Sore Throat  dextrose Oral Gel 15 Gram(s) Oral once PRN Blood Glucose LESS THAN 70 milliGRAM(s)/deciliter  guaiFENesin  milliGRAM(s) Oral every 12 hours PRN Cough  ondansetron Injectable 4 milliGRAM(s) IV Push every 6 hours PRN Nausea and/or Vomiting    PRESENT SYMPTOMS: [ ]Unable to self-report  [ ] CPOT [ ] PAINADs [ ] RDOS  Source if other than patient:  [ ]Family   [ ]Team     Pain: [ ]yes [x ]no  QOL impact -   Location -                    Aggravating factors -  Quality -  Radiation -  Timing-  Severity (0-10 scale):  Minimal acceptable level (0-10 scale):     CPOT:    https://www.sccm.org/getattachment/new97t50-8v7l-9w8x-1o0g-0579y8775l6r/Critical-Care-Pain-Observation-Tool-(CPOT)    PAIN AD Score:   http://geriatrictoolkit.[place].[email or web address]/cog/painad.pdf (press ctrl +  left click to view)    Dyspnea:                           [ ]Mild [ ]Moderate [ ]Severe      RDOS:  0 to 2  minimal or no respiratory distress   3  mild distress  4 to 6 moderate distress  >7 severe distress  https://homecareinformation.net/handouts/hen/Respiratory_Distress_Observation_Scale.pdf (Ctrl +  left click to view)     Anxiety:                             [ ]Mild [x ]Moderate [ ]Severe  Fatigue:                             [ ]Mild [ x]Moderate [ ]Severe  Nausea:                             [ ]Mild [ ]Moderate [ ]Severe  Loss of appetite:              [ ]Mild [x ]Moderate [ ]Severe  Constipation:                    [ ]Mild [ ]Moderate [ ]Severe    Other Symptoms:  [x ]All other review of systems negative     Palliative Performance Status Version 2:  30       %    http://npcrc.org/files/news/palliative_performance_scale_ppsv2.pdf  PHYSICAL EXAM:  Vital Signs Last 24 Hrs  T(C): 36.2 (13 Jul 2022 13:34), Max: 36.6 (12 Jul 2022 17:51)  T(F): 97.1 (13 Jul 2022 13:34), Max: 97.8 (12 Jul 2022 17:51)  HR: 89 (13 Jul 2022 13:34) (89 - 98)  BP: 137/77 (13 Jul 2022 13:34) (98/76 - 137/77)  BP(mean): --  RR: 18 (13 Jul 2022 13:34) (18 - 18)  SpO2: 99% (13 Jul 2022 13:34) (96% - 99%)    Parameters below as of 13 Jul 2022 13:34  Patient On (Oxygen Delivery Method): room air     I&O's Summary    12 Jul 2022 07:01  -  13 Jul 2022 07:00  --------------------------------------------------------  IN: 410 mL / OUT: 0 mL / NET: 410 mL    13 Jul 2022 07:01  -  13 Jul 2022 13:45  --------------------------------------------------------  IN: 0 mL / OUT: 0 mL / NET: 0 mL      GENERAL: [ ]Cachexia    [x ]Alert  [x ]Oriented x  3 [ ]Lethargic  [ ]Unarousable  [x ]Verbal  [ ]Non-Verbal  Behavioral:   [x ] Anxiety  [ ] Delirium [ ] Agitation [ ] Other  HEENT:  [ ]Normal   [ x]Dry mouth   [ ]ET Tube/Trach  [ ]Oral lesions  PULMONARY:   [ ]Clear [ ]Tachypnea  [x ]Audible excessive secretions   [ ]Rhonchi        [ ]Right [ ]Left [ ]Bilateral  [ ]Crackles        [ ]Right [ ]Left [ ]Bilateral  [ ]Wheezing     [ ]Right [ ]Left [ ]Bilateral  [ ]Diminished breath sounds [ ]right [ ]left [ ]bilateral  CARDIOVASCULAR:    [ ]Regular [ x]Irregular [ ]Tachy  [ ]Paramjit [ ]Murmur [ ]Other  GASTROINTESTINAL:  [ x]Soft  [ ]Distended   [x ]+BS  [ ]Non tender [ ]Tender  [ ]Other [ ]PEG [ ]OGT/ NGT  Last BM:  GENITOURINARY:  [ ]Normal [ x] Incontinent   [ ]Oliguria/Anuria   [ ]Vazquez  MUSCULOSKELETAL:   [ ]Normal   [ ]Weakness  [ x]Bed/Wheelchair bound [ ]Edema  NEUROLOGIC:   [x ]No focal deficits  [ ]Cognitive impairment  [ ]Dysphagia [ ]Dysarthria [ ]Paresis [ ]Other   SKIN:   [ ]Normal  [ ]Rash  [ ]Other  [ x]Pressure ulcer(s)       Present on admission [ ]y [ ]n    CRITICAL CARE:  [ ] Shock Present  [ ]Septic [ ]Cardiogenic [ ]Neurologic [ ]Hypovolemic  [ ]  Vasopressors [ ]  Inotropes   [ ]Respiratory failure present [ ]Mechanical ventilation [ ]Non-invasive ventilatory support [ ]High flow    [ ]Acute  [ ]Chronic [ ]Hypoxic  [ ]Hypercarbic [ ]Other  [ ]Other organ failure     LABS:                        10.8   12.24 )-----------( 380      ( 13 Jul 2022 08:48 )             32.8   07-13    123<L>  |  98  |  10  ----------------------------<  72  3.9   |  13<L>  |  0.64    Ca    8.3<L>      13 Jul 2022 08:48    TPro  3.4<L>  /  Alb  1.2<L>  /  TBili  0.3  /  DBili  x   /  AST  30  /  ALT  28  /  AlkPhos  119  07-13        RADIOLOGY & ADDITIONAL STUDIES:    < from: CT Chest No Cont (07.12.22 @ 20:34) >    ACC: [id number] EXAM:  CT CHEST                          PROCEDURE DATE:  07/12/2022          INTERPRETATION:  CLINICAL INFORMATION: Shortness of breath    COMPARISON: No prior study for comparison    CONTRAST/COMPLICATIONS:  IV Contrast: NONE  Oral Contrast: NONE  Complications: None reported at time of study completion    PROCEDURE:  CT scan of the chest was obtained without intravenous contrast. Sagittal   and coronal multiplanar reformations were submitted.    FINDINGS:    LYMPH NODES: No thoracic adenopathy    HEART/VASCULATURE: Heart size is normal. Small pericardial effusion.   Calcifications of the mitral annulus, aortic valve, aorta and coronary   arteries.    AIRWAYS/LUNGS/PLEURA: Central airways are patent. Expiratory motion   degraded study. Large pleural effusions. No pneumothorax.    UPPER ABDOMEN: Mild ascites. Small hiatal hernia. Partially visualized   left nephroureteral catheter.    BONES/SOFT TISSUES: Multilevel discogenic disease in the spine.   Osteopenia. Anasarca.    IMPRESSION:    Large pleural effusions. Small pericardial effusion. Mild ascites.   Anasarca.    --- End of Report ---        < end of copied text >  < from: CT Abdomen and Pelvis w/ IV Cont (06.30.22 @ 13:32) >  ACC: [id number] EXAM:  CT ABDOMEN AND PELVIS IC                          PROCEDURE DATE:  06/30/2022          INTERPRETATION:  CLINICAL INFORMATION: Nausea, vomiting x3.    COMPARISON: None.    CONTRAST/COMPLICATIONS:  IV Contrast: Omnipaque 350  90 cc administered   0 cc discarded  Oral Contrast: NONE  Complications: None reported at time of study completion    PROCEDURE:  CT of the Abdomen and Pelvis was performed.  Sagittal and coronal reformats were performed.    FINDINGS:  LOWER CHEST: Smallbilateral pleural effusions. Mitral annular   calcification.    LIVER: Within normal limits.  BILE DUCTS: Normal caliber.  GALLBLADDER: Within normal limits.  SPLEEN: Within normal limits.  PANCREAS: Within normal limits.  ADRENALS: Within normal limits.  KIDNEYS/URETERS: Mild left hydroureteronephrosis secondary to a 5 mm   calculus in the distal ureter. Urothelial enhancement of the left renal   pelvis and ureter. Left renal cortical scarring. Small renal parapelvic   and cortical cysts and subcentimeter hypodensities too small to   characterize.    BLADDER: Urothelial enhancement and subtle perivesicular fat stranding.  REPRODUCTIVE ORGANS: Small calcified exophytic fibroid. Endometrial   thickness measuring 8 mm.    BOWEL: Small hiatal hernia. No bowel obstruction. Appendix not   visualized. Rectum markedly distended with stool.  PERITONEUM: No ascites.  VESSELS: Atherosclerotic changes.  RETROPERITONEUM/LYMPH NODES: No lymphadenopathy.  ABDOMINAL WALL: Within normal limits.  BONES: Degenerative changes.    IMPRESSION:  Mild left hydroureteronephrosis secondary to a 5 mm calculus in the   distal ureter. Diffuse urothelial enhancement of the left renal pelvis   and ureter as well as the bladder with subtle perivesicular fat   stranding, concerning for urinary tract infection.    Rectum markedly distended with stool.    Prominent endometrial thickness measuring 8 mm. Pelvic ultrasound can be   performed for further evaluation.    Small bilateral pleural effusions.    < end of copied text >      PROTEIN CALORIE MALNUTRITION PRESENT: [ ]mild [ ]moderate [ ]severe [ ]underweight [ ]morbid obesity  https://www.andeal.org/vault/2440/web/files/ONC/Table_Clinical%20Characteristics%20to%20Document%20Malnutrition-White%20JV%20et%20al%202012.pdf    Height (cm): 160 (06-30-22 @ 12:23)  Weight (kg): 81.6 (06-30-22 @ 12:23)  BMI (kg/m2): 31.9 (06-30-22 @ 12:23)    [ ]PPSV2 < or = to 30% [ ]significant weight loss  [x ]poor nutritional intake  [ ]anasarca[ ]Artificial Nutrition      REFERRALS:   [ ]Chaplaincy  [ x]Hospice  [ ]Child Life  [ ]Social Work  [ x]Case management [ ]Holistic Therapy     Goals of Care Document:

## 2022-07-13 NOTE — PROGRESS NOTE ADULT - SUBJECTIVE AND OBJECTIVE BOX
[facility] - Anderson Hernandez, Kenny, Reddy, Rocio, Lonnie Martinez  Office Number:  [phone]    Patient resting comfortably in bed in NAD.  Laying flat with no respiratory distress.  No complaints of chest pain, dyspnea, palpitations, PND, or orthopnea.    F/U for:  CHF      MEDICATIONS  (STANDING):  dextrose 5%. 1000 milliLiter(s) (50 mL/Hr) IV Continuous <Continuous>  dextrose 5%. 1000 milliLiter(s) (100 mL/Hr) IV Continuous <Continuous>  dextrose 50% Injectable 25 Gram(s) IV Push once  dextrose 50% Injectable 12.5 Gram(s) IV Push once  dextrose 50% Injectable 25 Gram(s) IV Push once  famotidine Injectable 20 milliGRAM(s) IV Push two times a day  glucagon  Injectable 1 milliGRAM(s) IntraMuscular once  heparin   Injectable 5000 Unit(s) SubCutaneous every 8 hours  metoclopramide 5 milliGRAM(s) Oral every 6 hours  piperacillin/tazobactam IVPB.. 3.375 Gram(s) IV Intermittent every 8 hours  polyethylene glycol 3350 17 Gram(s) Oral daily  senna 2 Tablet(s) Oral at bedtime  sucralfate 1 Gram(s) Oral four times a day  tamsulosin 0.4 milliGRAM(s) Oral daily    MEDICATIONS  (PRN):  acetaminophen     Tablet .. 650 milliGRAM(s) Oral every 6 hours PRN Temp greater or equal to 38C (100.4F), Mild Pain (1 - 3)  benzocaine 15 mG/menthol 3.6 mG Lozenge 1 Lozenge Oral three times a day PRN Sore Throat  dextrose Oral Gel 15 Gram(s) Oral once PRN Blood Glucose LESS THAN 70 milliGRAM(s)/deciliter  guaiFENesin  milliGRAM(s) Oral every 12 hours PRN Cough  ondansetron Injectable 4 milliGRAM(s) IV Push every 6 hours PRN Nausea and/or Vomiting      Allergies    No Known Allergies    Intolerances        Vital Signs Last 24 Hrs  T(C): 36.6 (13 Jul 2022 01:30), Max: 36.6 (12 Jul 2022 13:05)  T(F): 97.8 (13 Jul 2022 01:30), Max: 97.9 (12 Jul 2022 13:05)  HR: 95 (13 Jul 2022 01:30) (95 - 99)  BP: 115/81 (13 Jul 2022 01:30) (90/72 - 117/75)  BP(mean): --  RR: 18 (13 Jul 2022 01:30) (18 - 18)  SpO2: 96% (13 Jul 2022 01:30) (96% - 98%)    Parameters below as of 13 Jul 2022 01:30  Patient On (Oxygen Delivery Method): room air        I&O's Summary    12 Jul 2022 07:01  -  13 Jul 2022 07:00  --------------------------------------------------------  IN: 410 mL / OUT: 0 mL / NET: 410 mL        ON EXAM:    Constitutional: NAD, alert and oriented x 3  Lungs:  Non-labored, breath sounds are decreased bilaterally, No wheezing, rales or rhonchi  Cardiovascular: RRR.  S1 and S2 positive.  + syst murmur at ru/lusb, no rubs, gallops or clicks  Gastrointestinal: Bowel Sounds present, soft, nontender.   Lymph: mild peripheral edema. No cervical lymphadenopathy.  Neurological: Alert, no focal deficits  Skin: No rashes or ulcers   Psych:  Mood & affect appropriate.    LABS: All Labs Reviewed:                        10.8   16.52 )-----------( 404      ( 12 Jul 2022 10:16 )             29.5                         12.0   14.66 )-----------( 619      ( 11 Jul 2022 12:19 )             33.9                         12.4   16.42 )-----------( 440      ( 10 Jul 2022 09:01 )             34.0     12 Jul 2022 21:48    125    |  96     |  10     ----------------------------<  88     3.4     |  19     |  0.69   12 Jul 2022 10:16    125    |  98     |  9      ----------------------------<  49     3.6     |  15     |  0.62   11 Jul 2022 12:19    125    |  97     |  9      ----------------------------<  59     3.9     |  18     |  0.58     Ca    8.3        12 Jul 2022 21:48  Ca    8.1        12 Jul 2022 10:16  Ca    8.1        11 Jul 2022 12:19  Phos  2.6       11 Jul 2022 12:19  Phos  3.1       10 Jul 2022 09:01  Mg     1.8       11 Jul 2022 12:19  Mg     1.9       10 Jul 2022 09:01            Blood Culture: Organism --  Gram Stain Blood -- Gram Stain --  Specimen Source .Blood Blood  Culture-Blood --    Organism --  Gram Stain Blood -- Gram Stain --  Specimen Source .Blood Blood  Culture-Blood --    Organism --  Gram Stain Blood -- Gram Stain --  Specimen Source Clean Catch Clean Catch (Midstream)  Culture-Blood --      07-12 @ 10:16  Pro Bnp 1512 07-11 @ 12:19  TSH: 6.11

## 2022-07-13 NOTE — CONSULT NOTE ADULT - PROBLEM SELECTOR RECOMMENDATION 2
-5mm L distal stone on CT scan - s/p cysto and L ureteral stent  -Urine culture with klebsiella and enterococcus. S/p course of abx per ID
Pulmonary consult per primary team  s/p ECHO  f/u Results  ?Chf component

## 2022-07-13 NOTE — CONSULT NOTE ADULT - SUBJECTIVE AND OBJECTIVE BOX
PULMONARY CONSULT    HPI: 80 y/o F with PMH of HTN, chronic back and leg pain (wheelchair bound at baseline). Presents to ED c/o vomiting x 2 days. Found to have a 5mm L distal stone on CT scan - s/p cysto and L ureteral stent.  S/p course abx. Course c/b SOB and wheezing. CT chest with large R, moderate L pl effusions. CT A/P (lower chest) on admission with small b/l pl effusions.     PAST MEDICAL & SURGICAL HISTORY:  Hypertension  Chronic back pain  Wheelchair bound  Left nephrolithiasis        Allergies  No Known Allergies    FAMILY HISTORY:    Social history:     Review of Systems:  CONSTITUTIONAL: No fever, chills, or fatigue  EYES: No eye pain, visual disturbances, or discharge  ENMT:  No difficulty hearing, tinnitus, vertigo; No sinus or throat pain  NECK: No pain or stiffness  RESPIRATORY: Per above  CARDIOVASCULAR: No chest pain, palpitations, dizziness, or leg swelling  GASTROINTESTINAL: Per above   GENITOURINARY: Per above   NEUROLOGICAL: No headaches, memory loss, loss of strength, numbness, or tremors  SKIN: No itching, burning, rashes, or lesions   MUSCULOSKELETAL: No joint pain or swelling; No muscle, back, or extremity pain  PSYCHIATRIC: No depression, anxiety, mood swings, or difficulty sleeping      Medications:  MEDICATIONS  (STANDING):  dextrose 5%. 1000 milliLiter(s) (50 mL/Hr) IV Continuous <Continuous>  dextrose 5%. 1000 milliLiter(s) (100 mL/Hr) IV Continuous <Continuous>  dextrose 50% Injectable 25 Gram(s) IV Push once  dextrose 50% Injectable 12.5 Gram(s) IV Push once  dextrose 50% Injectable 25 Gram(s) IV Push once  famotidine Injectable 20 milliGRAM(s) IV Push two times a day  furosemide   Injectable 40 milliGRAM(s) IV Push once  glucagon  Injectable 1 milliGRAM(s) IntraMuscular once  heparin   Injectable 5000 Unit(s) SubCutaneous every 8 hours  metoclopramide 5 milliGRAM(s) Oral every 6 hours  polyethylene glycol 3350 17 Gram(s) Oral daily  senna 2 Tablet(s) Oral at bedtime  sodium bicarbonate 650 milliGRAM(s) Oral three times a day  sucralfate 1 Gram(s) Oral four times a day  tamsulosin 0.4 milliGRAM(s) Oral daily    MEDICATIONS  (PRN):  acetaminophen     Tablet .. 650 milliGRAM(s) Oral every 6 hours PRN Temp greater or equal to 38C (100.4F), Mild Pain (1 - 3)  benzocaine 15 mG/menthol 3.6 mG Lozenge 1 Lozenge Oral three times a day PRN Sore Throat  dextrose Oral Gel 15 Gram(s) Oral once PRN Blood Glucose LESS THAN 70 milliGRAM(s)/deciliter  guaiFENesin  milliGRAM(s) Oral every 12 hours PRN Cough  ondansetron Injectable 4 milliGRAM(s) IV Push every 6 hours PRN Nausea and/or Vomiting            Vital Signs Last 24 Hrs  T(C): 36.3 (13 Jul 2022 10:04), Max: 36.6 (12 Jul 2022 13:05)  T(F): 97.3 (13 Jul 2022 10:04), Max: 97.9 (12 Jul 2022 13:05)  HR: 90 (13 Jul 2022 10:04) (90 - 99)  BP: 110/75 (13 Jul 2022 10:04) (90/72 - 115/81)  BP(mean): --  RR: 18 (13 Jul 2022 10:04) (18 - 18)  SpO2: 97% (13 Jul 2022 10:04) (96% - 98%)    Parameters below as of 13 Jul 2022 10:04  Patient On (Oxygen Delivery Method): room air                07-12 @ 07:01  -  07-13 @ 07:00  --------------------------------------------------------  IN: 410 mL / OUT: 0 mL / NET: 410 mL          LABS:                        10.8   12.24 )-----------( 380      ( 13 Jul 2022 08:48 )             32.8     07-13    123<L>  |  98  |  10  ----------------------------<  72  3.9   |  13<L>  |  0.64    Ca    8.3<L>      13 Jul 2022 08:48    TPro  3.4<L>  /  Alb  1.2<L>  /  TBili  0.3  /  DBili  x   /  AST  30  /  ALT  28  /  AlkPhos  119  07-13          CAPILLARY BLOOD GLUCOSE      POCT Blood Glucose.: 206 mg/dL (12 Jul 2022 13:18)          Serum Pro-Brain Natriuretic Peptide: 1512 pg/mL (07-12-22 @ 10:16)              CULTURES:      (collected 07-11-22 @ 12:05)  Source: .Blood Blood  Preliminary Report (07-12-22 @ 17:02):    No growth to date.     (collected 07-11-22 @ 11:58)  Source: .Blood Blood  Preliminary Report (07-12-22 @ 17:02):    No growth to date.        Culture - Urine (collected 07-10-22 @ 17:58)  Source: Clean Catch Clean Catch (Midstream)  Final Report (07-12-22 @ 16:00):    10,000 - 49,000 CFU/mL Candida glabrata "Susceptibilities not performed"                    Physical Examination:    General: No acute distress.      HEENT: Pupils equal, reactive to light.  Symmetric.    PULM:     CVS: S1, S2    ABD: Soft, nondistended, nontender, normoactive bowel sounds, no masses    EXT: No edema, nontender    SKIN: Warm and well perfused, no rashes noted.    NEURO: Alert, oriented, interactive, nonfocal        RADIOLOGY REVIEWED    CT chest: < from: CT Chest No Cont (07.12.22 @ 20:34) >  FINDINGS:    LYMPH NODES: No thoracic adenopathy    HEART/VASCULATURE: Heart size is normal. Small pericardial effusion.   Calcifications of the mitral annulus, aortic valve, aorta and coronary   arteries.    AIRWAYS/LUNGS/PLEURA: Central airways are patent. Expiratory motion   degraded study. Large pleural effusions. No pneumothorax.    UPPER ABDOMEN: Mild ascites. Small hiatal hernia. Partially visualized   left nephroureteral catheter.    BONES/SOFT TISSUES: Multilevel discogenic disease in the spine.   Osteopenia. Anasarca.    IMPRESSION:    Large pleural effusions. Small pericardial effusion. Mild ascites.   Anasarca.    < end of copied text >      TTE:   PULMONARY CONSULT    HPI: 80 y/o F with PMH of HTN, chronic back and leg pain (wheelchair bound at baseline). Presents to ED c/o vomiting x 2 days. Found to have a 5mm L distal stone on CT scan - s/p cysto and L ureteral stent.  S/p course abx. Called to consult for CT chest findings with large R, moderate L pl effusions. CT A/P (lower chest) on admission with small b/l pl effusions. Denies SOB, CP.     PAST MEDICAL & SURGICAL HISTORY:  Hypertension  Chronic back pain  Wheelchair bound  Left nephrolithiasis        Allergies  No Known Allergies    FAMILY HISTORY: non contributory     Social history: never a smoker     Review of Systems:  CONSTITUTIONAL: No fever, chills, or fatigue  EYES: No eye pain, visual disturbances, or discharge  ENMT:  No difficulty hearing, tinnitus, vertigo; No sinus or throat pain  NECK: No pain or stiffness  RESPIRATORY: Per above  CARDIOVASCULAR: No chest pain, palpitations, dizziness  GASTROINTESTINAL: Per above   GENITOURINARY: Per above   NEUROLOGICAL: No headaches, memory loss, loss of strength, numbness, or tremors  SKIN: No itching, burning, rashes, or lesions   MUSCULOSKELETAL: No joint pain or swelling; No muscle, back, or extremity pain  PSYCHIATRIC: No depression, anxiety, mood swings, or difficulty sleeping      Medications:  MEDICATIONS  (STANDING):  dextrose 5%. 1000 milliLiter(s) (50 mL/Hr) IV Continuous <Continuous>  dextrose 5%. 1000 milliLiter(s) (100 mL/Hr) IV Continuous <Continuous>  dextrose 50% Injectable 25 Gram(s) IV Push once  dextrose 50% Injectable 12.5 Gram(s) IV Push once  dextrose 50% Injectable 25 Gram(s) IV Push once  famotidine Injectable 20 milliGRAM(s) IV Push two times a day  furosemide   Injectable 40 milliGRAM(s) IV Push once  glucagon  Injectable 1 milliGRAM(s) IntraMuscular once  heparin   Injectable 5000 Unit(s) SubCutaneous every 8 hours  metoclopramide 5 milliGRAM(s) Oral every 6 hours  polyethylene glycol 3350 17 Gram(s) Oral daily  senna 2 Tablet(s) Oral at bedtime  sodium bicarbonate 650 milliGRAM(s) Oral three times a day  sucralfate 1 Gram(s) Oral four times a day  tamsulosin 0.4 milliGRAM(s) Oral daily    MEDICATIONS  (PRN):  acetaminophen     Tablet .. 650 milliGRAM(s) Oral every 6 hours PRN Temp greater or equal to 38C (100.4F), Mild Pain (1 - 3)  benzocaine 15 mG/menthol 3.6 mG Lozenge 1 Lozenge Oral three times a day PRN Sore Throat  dextrose Oral Gel 15 Gram(s) Oral once PRN Blood Glucose LESS THAN 70 milliGRAM(s)/deciliter  guaiFENesin  milliGRAM(s) Oral every 12 hours PRN Cough  ondansetron Injectable 4 milliGRAM(s) IV Push every 6 hours PRN Nausea and/or Vomiting            Vital Signs Last 24 Hrs  T(C): 36.3 (13 Jul 2022 10:04), Max: 36.6 (12 Jul 2022 13:05)  T(F): 97.3 (13 Jul 2022 10:04), Max: 97.9 (12 Jul 2022 13:05)  HR: 90 (13 Jul 2022 10:04) (90 - 99)  BP: 110/75 (13 Jul 2022 10:04) (90/72 - 115/81)  BP(mean): --  RR: 18 (13 Jul 2022 10:04) (18 - 18)  SpO2: 97% (13 Jul 2022 10:04) (96% - 98%)    Parameters below as of 13 Jul 2022 10:04  Patient On (Oxygen Delivery Method): room air                07-12 @ 07:01  -  07-13 @ 07:00  --------------------------------------------------------  IN: 410 mL / OUT: 0 mL / NET: 410 mL          LABS:                        10.8   12.24 )-----------( 380      ( 13 Jul 2022 08:48 )             32.8     07-13    123<L>  |  98  |  10  ----------------------------<  72  3.9   |  13<L>  |  0.64    Ca    8.3<L>      13 Jul 2022 08:48    TPro  3.4<L>  /  Alb  1.2<L>  /  TBili  0.3  /  DBili  x   /  AST  30  /  ALT  28  /  AlkPhos  119  07-13          CAPILLARY BLOOD GLUCOSE      POCT Blood Glucose.: 206 mg/dL (12 Jul 2022 13:18)          Serum Pro-Brain Natriuretic Peptide: 1512 pg/mL (07-12-22 @ 10:16)              CULTURES:      (collected 07-11-22 @ 12:05)  Source: .Blood Blood  Preliminary Report (07-12-22 @ 17:02):    No growth to date.     (collected 07-11-22 @ 11:58)  Source: .Blood Blood  Preliminary Report (07-12-22 @ 17:02):    No growth to date.        Culture - Urine (collected 07-10-22 @ 17:58)  Source: Clean Catch Clean Catch (Midstream)  Final Report (07-12-22 @ 16:00):    10,000 - 49,000 CFU/mL Candida glabrata "Susceptibilities not performed"                    Physical Examination:    General: No acute distress.      HEENT: Pupils equal, reactive to light.  Symmetric.    PULM:     CVS: S1, S2    ABD: Soft, nondistended, nontender, normoactive bowel sounds, no masses    EXT: No edema, nontender    SKIN: Warm and well perfused, no rashes noted.    NEURO: Alert, oriented, interactive, nonfocal        RADIOLOGY REVIEWED    CT chest: < from: CT Chest No Cont (07.12.22 @ 20:34) >  FINDINGS:    LYMPH NODES: No thoracic adenopathy    HEART/VASCULATURE: Heart size is normal. Small pericardial effusion.   Calcifications of the mitral annulus, aortic valve, aorta and coronary   arteries.    AIRWAYS/LUNGS/PLEURA: Central airways are patent. Expiratory motion   degraded study. Large pleural effusions. No pneumothorax.    UPPER ABDOMEN: Mild ascites. Small hiatal hernia. Partially visualized   left nephroureteral catheter.    BONES/SOFT TISSUES: Multilevel discogenic disease in the spine.   Osteopenia. Anasarca.    IMPRESSION:    Large pleural effusions. Small pericardial effusion. Mild ascites.   Anasarca.    < end of copied text >      TTE: < from: Transthoracic Echocardiogram (07.13.22 @ 11:17) >  Dimensions:    Normal Values:  LA:     2.8    2.0 - 4.0 cm  Ao:     3.0    2.0 - 3.8 cm  SEPTUM: 1.1    0.6 - 1.2 cm  PWT:    1.10.6 - 1.1 cm  LVIDd:  2.4    3.0 - 5.6 cm  LVIDs:         1.8 - 4.0 cm  Derived variables:  LVMI: 38 g/m2  RWT: 0.91  EF (Single Plane Ellipsoid): 73 %Doppler Peak Velocity  (m/sec): AoV=1.4  ------------------------------------------------------------------------  Observations:  Mitral Valve: Mitral annular calcification and calcified  mitral leaflets with decreased diastolic opening. Mild  mitral regurgitation.  Peak mitral valve gradient equals 11  mm Hg, mean transmitral valve gradient equals3 mm Hg,  consistent with mild mitral stenosis. Gradients are  measured at a HR of 93bpm.  Aortic Valve/Aorta: Aortic valve not well visualized;  appears calcified. Peak transaortic valve gradient equals 8  mm Hg. No aortic valve regurgitation seen. Peak left  ventricular outflow tract gradient equals 57 mm Hg, mean  gradient is equal to 4 mm Hg, LVOT velocity time integral  equals 27 cm, consistent with moderately severe LVOT  obstruction.  Aortic Root: 3 cm.  Left Atrium: Normal left atrium.  Left Ventricle: Hyperdynamic left ventricular systolic  function. Normal left ventricular internal dimensions and  wall thicknesses. Mild diastolic dysfunction (Stage I).  Right Heart: Normal right atrium. Normal right ventricular  size and function. Normal tricuspid valve. Mild tricuspid  regurgitation. Pulmonic valve not well visualized, probably  normal.  Pericardium/Pleura: Small pericardial effusion. The  effusion measures 0.7cm circumferentially.  No evidence of  right atrial or right ventricular collapse. No  echocardiographic evidence of pericardial tamponade.  Hemodynamic: Estimated right atrial pressure is 8 mm Hg.  Estimated right ventricular systolic pressure equals 33 mm  Hg, assuming right atrial pressure equals 8 mm Hg,  consistent with normal pulmonary pressures.  ------------------------------------------------------------------------  Conclusions:  1. Mitral annular calcification and calcified mitral  leaflets with decreased diastolic opening. Mild mitral  regurgitation.  Peak mitral valve gradient equals 11 mm Hg,  mean transmitral valve gradient equals 3 mm Hg, consistent  with mild mitral stenosis. Gradients are measured at a HR  of 93bpm.  2. Aortic valve not well visualized; appears calcified. No  aortic valve regurgitation seen.  3. Hyperdynamic left ventricular systolic function. Peak  left ventricular outflow tract gradient equals 57 mm Hg,  mean gradient is equal to 4 mm Hg, LVOT velocity time  integral equals 27 cm, consistent with moderately severe  LVOTobstruction.  4. Mild diastolic dysfunction (Stage I).  5. Normal right ventricular size and function.  6. Small pericardial effusion. The effusion measures 0.7cm  circumferentially.  No evidence of right atrial or right  ventricular collapse. No echocardiographic evidence of  pericardial tamponade.  *** No previous Echo exam.    < end of copied text >

## 2022-07-13 NOTE — PROGRESS NOTE ADULT - SUBJECTIVE AND OBJECTIVE BOX
[facility]   MD ZANE SERRATO MD, DO KELLY CARIAS NP    TEL:  OFFICE: [phone]    From 5pm-7am Answering Service [phone]    -- RENAL FOLLOW UP NOTE ---Date of Service 07-13-22 @ 13:49    Patient is a 81y old  Female who presents with a chief complaint of vomiting (13 Jul 2022 12:43)      Patient seen and examined at bedside. No chest pain/sob    VITALS:  T(F): 97.1 (07-13-22 @ 13:34), Max: 97.8 (07-12-22 @ 17:51)  HR: 89 (07-13-22 @ 13:34)  BP: 137/77 (07-13-22 @ 13:34)  RR: 18 (07-13-22 @ 13:34)  SpO2: 99% (07-13-22 @ 13:34)  Wt(kg): --    07-12 @ 07:01  -  07-13 @ 07:00  --------------------------------------------------------  IN: 410 mL / OUT: 0 mL / NET: 410 mL    07-13 @ 07:01  -  07-13 @ 13:49  --------------------------------------------------------  IN: 0 mL / OUT: 0 mL / NET: 0 mL          PHYSICAL EXAM:  Constitutional: NAD  Neck: No JVD  Respiratory: CTAB, no wheezes, rales or rhonchi  Cardiovascular: S1, S2, RRR  Gastrointestinal: BS+, soft, NT/ND  Extremities: + peripheral edema    Hospital Medications:   MEDICATIONS  (STANDING):  dextrose 5%. 1000 milliLiter(s) (50 mL/Hr) IV Continuous <Continuous>  dextrose 5%. 1000 milliLiter(s) (100 mL/Hr) IV Continuous <Continuous>  dextrose 50% Injectable 25 Gram(s) IV Push once  dextrose 50% Injectable 12.5 Gram(s) IV Push once  dextrose 50% Injectable 25 Gram(s) IV Push once  famotidine Injectable 20 milliGRAM(s) IV Push two times a day  furosemide   Injectable 40 milliGRAM(s) IV Push once  glucagon  Injectable 1 milliGRAM(s) IntraMuscular once  heparin   Injectable 5000 Unit(s) SubCutaneous every 8 hours  metoclopramide 5 milliGRAM(s) Oral every 6 hours  polyethylene glycol 3350 17 Gram(s) Oral daily  senna 2 Tablet(s) Oral at bedtime  sodium bicarbonate 650 milliGRAM(s) Oral three times a day  sucralfate 1 Gram(s) Oral four times a day  tamsulosin 0.4 milliGRAM(s) Oral daily      LABS:  07-13    123<L>  |  98  |  10  ----------------------------<  72  3.9   |  13<L>  |  0.64    Ca    8.3<L>      13 Jul 2022 08:48    TPro  3.4<L>  /  Alb  1.2<L>  /  TBili  0.3  /  DBili      /  AST  30  /  ALT  28  /  AlkPhos  119  07-13    Creatinine Trend: 0.64 <--, 0.69 <--, 0.62 <--, 0.58 <--, 0.55 <--, 0.51 <--, 0.48 <--, 0.49 <--, 0.51 <--, 0.52 <--    Albumin, Serum: 1.2 g/dL (07-13 @ 08:48)                              10.8   12.24 )-----------( 380      ( 13 Jul 2022 08:48 )             32.8     Urine Studies:  Urinalysis - [07-10-22 @ 17:58]      Color Yellow / Appearance Slightly Turbid / SG 1.022 / pH 6.0      Gluc Negative / Ketone Negative  / Bili Negative / Urobili Negative       Blood Large / Protein 30 mg/dL / Leuk Est Large / Nitrite Negative       /  / Hyaline 0 / Gran  / Sq Epi  / Non Sq Epi 1 / Bacteria Few    Urine Sodium 34      [07-10-22 @ 17:59]  Urine Osmolality 320      [07-10-22 @ 17:58]    TSH 6.11      [07-11-22 @ 12:19]        RADIOLOGY & ADDITIONAL STUDIES:

## 2022-07-13 NOTE — CONSULT NOTE ADULT - CONSULT REQUESTED DATE/TIME
12-Jul-2022 08:58
01-Jul-2022
01-Jul-2022 14:15
04-Jul-2022 09:01
05-Jul-2022 17:03
10-Jul-2022 10:59
13-Jul-2022 14:41
13-Jul-2022 13:00

## 2022-07-13 NOTE — CONSULT NOTE ADULT - NS PRO AD PATIENT TYPE
Health Care Proxy (HCP)/Medical Orders for Life-Sustaining Treatment (MOLST) Hatchet Flap Text: The defect edges were debeveled with a #15 scalpel blade.  Given the location of the defect, shape of the defect and the proximity to free margins a hatchet flap was deemed most appropriate.  Using a sterile surgical marker, an appropriate hatchet flap was drawn incorporating the defect and placing the expected incisions within the relaxed skin tension lines where possible.    The area thus outlined was incised deep to adipose tissue with a #15 scalpel blade.  The skin margins were undermined to an appropriate distance in all directions utilizing iris scissors.

## 2022-07-13 NOTE — CONSULT NOTE ADULT - CONVERSATION DETAILS
Met with patient and HCP/Chela at the bedside.  Spent greater than 35 minutes discussing goals of care.   Patient is able to participate in discussion and was very clear with her wishes to go home and " die in peace".   She describes her life  of dependency with persistent and continued medical complications. Patient wishes to return home without further medical interventions.   Patient was able to describe her understanding of hospice and was amenable to a referral.   We completed a HCP document appointing her sister Chela Wei as primary and Matti Wei as an alternate.   Copies of documents placed in chart, originals given to HCP Chela.   Jossue updated accordingly.  Hopsice referral established via . Patient is full comfort approach with no further aggressive medical interventions.   Patient lives home with private aides 24/7 .

## 2022-07-13 NOTE — PROGRESS NOTE ADULT - TIME BILLING
assessment/plan and coordinating care
assessment/plan and coordinating care
assessment/plan and coordinating care  Na not improving with diuretics and fluid restriction, still hypervolemia-Tolvaptan as suggested

## 2022-07-13 NOTE — PROGRESS NOTE ADULT - SUBJECTIVE AND OBJECTIVE BOX
CC: f/u for leukocytosis    Patient reports: she is withdrawn, offers few words in response to questions    REVIEW OF SYSTEMS:  All other review of systems negative (Comprehensive ROS)    Antimicrobials Day #  :day 1  piperacillin/tazobactam IVPB.. 3.375 Gram(s) IV Intermittent every 8 hours    Other Medications Reviewed  MEDICATIONS  (STANDING):  dextrose 5%. 1000 milliLiter(s) (50 mL/Hr) IV Continuous <Continuous>  dextrose 5%. 1000 milliLiter(s) (100 mL/Hr) IV Continuous <Continuous>  dextrose 50% Injectable 25 Gram(s) IV Push once  dextrose 50% Injectable 12.5 Gram(s) IV Push once  dextrose 50% Injectable 25 Gram(s) IV Push once  famotidine Injectable 20 milliGRAM(s) IV Push two times a day  glucagon  Injectable 1 milliGRAM(s) IntraMuscular once  heparin   Injectable 5000 Unit(s) SubCutaneous every 8 hours  metoclopramide 5 milliGRAM(s) Oral every 6 hours  piperacillin/tazobactam IVPB.. 3.375 Gram(s) IV Intermittent every 8 hours  polyethylene glycol 3350 17 Gram(s) Oral daily  senna 2 Tablet(s) Oral at bedtime  sucralfate 1 Gram(s) Oral four times a day  tamsulosin 0.4 milliGRAM(s) Oral daily    T(F): 97.3 (07-13-22 @ 10:04), Max: 97.9 (07-12-22 @ 13:05)  HR: 90 (07-13-22 @ 10:04)  BP: 110/75 (07-13-22 @ 10:04)  RR: 18 (07-13-22 @ 10:04)  SpO2: 97% (07-13-22 @ 10:04)  Wt(kg): --    PHYSICAL EXAM:  General: alert, no acute distress  Eyes:  anicteric, no conjunctival injection, no discharge  Oropharynx: no lesions or injection 	  Neck: supple, without adenopathy  Lungs: clear to auscultation  Heart: regular rate and rhythm; no murmur, rubs or gallops  Abdomen: soft, nondistended, nontender, without mass or organomegaly  Skin: no lesions  Extremities: no clubbing, cyanosis, or edema  Neurologic: alert, oriented,withdrawn, moves all extremities    LAB RESULTS:                        10.8   12.24 )-----------( 380      ( 13 Jul 2022 08:48 )             32.8     07-13    123<L>  |  98  |  10  ----------------------------<  72  3.9   |  13<L>  |  0.64    Ca    8.3<L>      13 Jul 2022 08:48  Phos  2.6     07-11  Mg     1.8     07-11    TPro  3.4<L>  /  Alb  1.2<L>  /  TBili  0.3  /  DBili  x   /  AST  30  /  ALT  28  /  AlkPhos  119  07-13    LIVER FUNCTIONS - ( 13 Jul 2022 08:48 )  Alb: 1.2 g/dL / Pro: 3.4 g/dL / ALK PHOS: 119 U/L / ALT: 28 U/L / AST: 30 U/L / GGT: x             MICROBIOLOGY:  RECENT CULTURES:  07-11 @ 12:05 .Blood Blood     No growth to date.      07-11 @ 11:58 .Blood Blood     No growth to date.      07-10 @ 17:58 Clean Catch Clean Catch (Midstream)     10,000 - 49,000 CFU/mL Candida glabrata "Susceptibilities not performed"          RADIOLOGY REVIEWED:    < from: CT Chest No Cont (07.12.22 @ 20:34) >  INTERPRETATION:  Large bilateral pleural effusions with associated   passive atelectasis.  Left upper lobe patchy groundglass opacities.  Bibasilar pleural calcifications.    Anasarca.    Follow up final report in AM.    < end of copied text >

## 2022-07-13 NOTE — PROGRESS NOTE ADULT - ASSESSMENT
81 year old female with PMHx of HTN, chronic back and leg pain (wheelchair bound at baseline), who presented with an obstructing kidney stone and rectal distention from stool, had a ureteral stent placed and has now finished 10 day of antibiotics.   More recently, she has been borderline hypoxic, and there was suggestion of volume overload, based on her exam and CXR.    - appears volume overloaded on exam, and CXR from 7/11 with vida effusions and pulmonary congestion  - got IV Lasix, but sodium now trending down and on Tolvaptam.  Follow up AM sodium.  If sodium allows, would resume gentle diuresis with IV Lasix.    - await echocardiogram   - cont to trend creatinine and sodium closely  - ekgs have documented paf, though most likely a sr/st with with apcs. She does remain borderline tachycardic per flow sheets, and if continues, would put her on telemetry monitoring.  - renal follow up appreciated  - further evaluation will depend on clinical course  - will follow with you

## 2022-07-13 NOTE — PROGRESS NOTE ADULT - ASSESSMENT
81 year old female with PMHx of HTN, chronic back and leg pain (wheelchair bound at baseline) presents to ED c/o vomiting x 2 days.   Found to have a 5mm L distal stone on CT scan. Pt denies history of stones, no flank pain, dysuria, urgency, frequency or gross hematuria.          Acute Hypoxic respiratory Failure  Fluid over load   pleural effusions   Hold  IV Lasix   s/p Tolvaptan if Na levels normal consider iv Lasix as per cards     CTchest reviewed Ascites with Large pleural effusions   Pleural Effusions Pulm consult appreciated   Monitor for now        Electrolyte Imbalance Supplement as needed  Moniotr BMP     Hyponatremia SIADH  dehydration s/p Tolvaptan   Free water restriction   Renal following        Nausea vomiting   Dysphagia     GI consulted   Recommend Reglan Zofran prn   Carafate         Nephrolithiasis s/p stent placement   Patient afebrile   Cultures shows Emterococci completed abx   ID following       HTN Controlled off meds     Chronic wounds wound care consult appreciated

## 2022-07-13 NOTE — CONSULT NOTE ADULT - PROVIDER SPECIALTY LIST ADULT
Gastroenterology
Nephrology
Pulmonology
Cardiology
Internal Medicine
Wound Care
Infectious Disease
Palliative Care

## 2022-07-13 NOTE — CONSULT NOTE ADULT - ASSESSMENT
81 year old female with PMHx of HTN, chronic back and leg pain (wheelchair bound at baseline) presents to ED c/o vomiting x 2 days.   Found to have a 5mm L distal stone on CT scan, hospitalization c/b aspiration pneumonia, large pulmonary effusions and persistent hyponatremia.  Palliative called for advance care planning and goals of care

## 2022-07-13 NOTE — PROGRESS NOTE ADULT - ASSESSMENT
81 year old female with PMHx of HTN, chronic back and leg pain (wheelchair bound at baseline) presents to ED c/o vomiting x 2 days. Found to have a 5mm L distal stone on CT scan. S/P left ureteral stent, has UTI getting abx, has been having nausea/vomiting GI on board, has persistent hyponatremia    A/P:  Hyponatremia:  Etiology?  Na is worsening today   clinically hypervolemic  urine study is inconclusive   give lasix 40mg IV today    suggests to give tolvaptan 15mg one dose today again , repeat BMP at 4pm and call nephrology with the result   discontinue fluid restriction   Na should not increase more than 6-8meq in 24hrs  monitor BMP closely     Acidosis:  Non-AG  start oral bicarb 650mg tid   monitor     Hypocalcemia:  sec to low albumin

## 2022-07-13 NOTE — PROGRESS NOTE ADULT - SUBJECTIVE AND OBJECTIVE BOX
HPI:  81 year old female with PMHx of HTN, chronic back and leg pain (wheelchair bound at baseline) presents to ED c/o vomiting x 2 days. Today patient had wound care appointment for pressure wound to her R calf and vomited again so was brought in by aide. Found to have a 5mm L distal stone on CT scan. Pt denies history of stones, no flank pain, dysuria, urgency, frequency or gross hematuria.  (30 Jun 2022 17:15)        Patient in respiratory Distress Hypoxic     T(C): 36.3 (07-13-22 @ 21:23), Max: 36.3 (07-13-22 @ 21:23)  HR: 97 (07-13-22 @ 21:23) (89 - 97)  BP: 101/70 (07-13-22 @ 21:23) (101/70 - 137/77)  RR: 18 (07-13-22 @ 21:23) (18 - 18)  SpO2: 97% (07-13-22 @ 21:23) (97% - 99%)      MEDICATIONS  (STANDING):  dextrose 5%. 1000 milliLiter(s) (50 mL/Hr) IV Continuous <Continuous>  dextrose 5%. 1000 milliLiter(s) (100 mL/Hr) IV Continuous <Continuous>  dextrose 50% Injectable 25 Gram(s) IV Push once  dextrose 50% Injectable 12.5 Gram(s) IV Push once  dextrose 50% Injectable 25 Gram(s) IV Push once  famotidine Injectable 20 milliGRAM(s) IV Push two times a day  glucagon  Injectable 1 milliGRAM(s) IntraMuscular once  heparin   Injectable 5000 Unit(s) SubCutaneous every 8 hours  metoclopramide 5 milliGRAM(s) Oral every 6 hours  polyethylene glycol 3350 17 Gram(s) Oral daily  senna 2 Tablet(s) Oral at bedtime  sodium bicarbonate 650 milliGRAM(s) Oral three times a day  sucralfate 1 Gram(s) Oral four times a day  tamsulosin 0.4 milliGRAM(s) Oral daily    MEDICATIONS  (PRN):  acetaminophen     Tablet .. 650 milliGRAM(s) Oral every 6 hours PRN Temp greater or equal to 38C (100.4F), Mild Pain (1 - 3)  benzocaine 15 mG/menthol 3.6 mG Lozenge 1 Lozenge Oral three times a day PRN Sore Throat  dextrose Oral Gel 15 Gram(s) Oral once PRN Blood Glucose LESS THAN 70 milliGRAM(s)/deciliter  guaiFENesin  milliGRAM(s) Oral every 12 hours PRN Cough  ondansetron Injectable 4 milliGRAM(s) IV Push every 6 hours PRN Nausea and/or Vomiting    PAST MEDICAL & SURGICAL HISTORY:  Hypertension      Chronic back pain      Wheelchair bound      Left nephrolithiasis          Review of Systems:   CONSTITUTIONAL: No fever, weight loss, or fatigue  EYES: No eye pain, visual disturbances, or discharge  ENMT:  No difficulty hearing, tinnitus, vertigo; No sinus or throat pain  NECK: No pain or stiffness  BREASTS: No pain, masses, or nipple discharge  RESPIRATORY: No cough, wheezing, chills or hemoptysis; No shortness of breath  CARDIOVASCULAR: No chest pain, palpitations, dizziness, or leg swelling  GASTROINTESTINAL: No abdominal or epigastric pain. No nausea, vomiting, or hematemesis; No diarrhea or constipation. No melena or hematochezia.  GENITOURINARY: No dysuria, frequency, hematuria, or incontinence  NEUROLOGICAL: No headaches, memory loss, loss of strength, numbness, or tremors  SKIN: No itching, burning, rashes, or lesions   LYMPH NODES: No enlarged glands  ENDOCRINE: No heat or cold intolerance; No hair loss  MUSCULOSKELETAL: No joint pain or swelling; No muscle, back, or extremity pain  PSYCHIATRIC: No depression, anxiety, mood swings, or difficulty sleeping  HEME/LYMPH: No easy bruising, or bleeding gums  ALLERY AND IMMUNOLOGIC: No hives or eczema    Allergies    No Known Allergies    Intolerances  PHYSICAL EXAM:  GENERAL: NAD, well-developed  HEAD:  Atraumatic, Normocephalic  EYES: EOMI, PERRLA, conjunctiva and sclera clear  NECK: Supple, No JVD  CHEST/LUNG: Clear to auscultation bilaterally; No wheeze  HEART: Regular rate and rhythm; No murmurs, rubs, or gallops  ABDOMEN: Soft, Nontender, Nondistended; Bowel sounds present  EXTREMITIES:  2+ Peripheral Pulses, No clubbing, cyanosis, or edema  PSYCH: AAOx3  NEUROLOGY: non-focal  SKIN: No rashes or lesions                                                      10.8   12.24 )-----------( 380      ( 13 Jul 2022 08:48 )             32.8           LIVER FUNCTIONS - ( 13 Jul 2022 08:48 )  Alb: 1.2 g/dL / Pro: 3.4 g/dL / ALK PHOS: 119 U/L / ALT: 28 U/L / AST: 30 U/L / GGT: x             126|99|9<85  3.5|18|0.68  8.5,--,--  07-13 @ 17:10  123|98|10<72  3.9|13|0.64  8.3,--,--  07-13 @ 08:48  3.9|13|0.64  8.3,--,--  07-13 @ 08:48

## 2022-07-13 NOTE — PROGRESS NOTE ADULT - ASSESSMENT
81y female with PMHx of HTN, chronic back and leg pain (wheelchair bound at baseline) presents to ED c/o vomiting x 2 days. Today patient had wound care appointment for pressure wound to her R calf and vomited again so was brought in by aide. Found to have a 5mm L distal stone on CT scan. Pt denies history of stones, no flank pain, dysuria, urgency, frequency or gross hematuria. On 6/30 the patient underwent a cysto procedure with left stent placement. Urine culture was sent and now reporting growing Klebsiella and Enterococcus in the urine culture. ID consulted for further antibiotics management.  Reviewed blood cultures are reported to be no growth to date.   She appears stable from ID viewpoint.  She completed 10 days of zosyn on 7/10  Stable off antibiotics but has ongoing leukocytosis.  Hemodynamically stable  Being diuresed by cardiology  local wound care for leg wounds  Follow conservatively  7/11 blood cultures are negative and urine with Candida Glabrata  CT of chest with large b/l effusions, atelectatsis, and ground glass changes at apices  Suggest:  1.Favor d/c of zosyn- she has yeast in urine and CT scan does not really show any pneumonia, I am not sure why it was started.  2. I think we can consider yeast as a bladder colonizer, however she does have risk factors for developing an ascending infection  3.Her leukocytosis has improved, I think it can be monitored conservatively 81y female with PMHx of HTN, chronic back and leg pain (wheelchair bound at baseline) presents to ED c/o vomiting x 2 days. Today patient had wound care appointment for pressure wound to her R calf and vomited again so was brought in by aide. Found to have a 5mm L distal stone on CT scan. Pt denies history of stones, no flank pain, dysuria, urgency, frequency or gross hematuria. On 6/30 the patient underwent a cysto procedure with left stent placement. Urine culture was sent and now reporting growing Klebsiella and Enterococcus in the urine culture. ID consulted for further antibiotics management.  Reviewed blood cultures are reported to be no growth to date.   She appears stable from ID viewpoint.  She completed 10 days of zosyn on 7/10  Stable off antibiotics but has ongoing leukocytosis.  Hemodynamically stable  Being diuresed by cardiology  local wound care for leg wounds  Follow conservatively  7/11 blood cultures are negative and urine with Candida Glabrata  CT of chest with large b/l effusions, atelectatsis, and ground glass changes at apices  Suggest:  1.Favor d/c of zosyn- she has yeast in urine and CT scan does not really show any pneumonia, I am not sure why it was started.  2. I think we can consider yeast as a bladder colonizer, however she does have risk factors for developing an ascending infection  3.Her leukocytosis has improved, I think it can be monitored conservatively  4.? Need for thoracentesis

## 2022-07-13 NOTE — PROGRESS NOTE ADULT - SUBJECTIVE AND OBJECTIVE BOX
[facility]  Yannick Castillo PA-C  [address] [place] [address] [phone]      INTERVAL HPI/OVERNIGHT EVENTS:  pt seen and examined, no new events      MEDICATIONS  (STANDING):  famotidine Injectable 20 milliGRAM(s) IV Push two times a day  heparin   Injectable 5000 Unit(s) SubCutaneous every 8 hours  metoclopramide 5 milliGRAM(s) Oral every 6 hours  polyethylene glycol 3350 17 Gram(s) Oral daily  senna 2 Tablet(s) Oral at bedtime  sodium bicarbonate  Infusion 0.23 mEq/kG/Hr (250 mL/Hr) IV Continuous <Continuous>  sucralfate 1 Gram(s) Oral four times a day  tamsulosin 0.4 milliGRAM(s) Oral daily    MEDICATIONS  (PRN):  acetaminophen     Tablet .. 650 milliGRAM(s) Oral every 6 hours PRN Temp greater or equal to 38C (100.4F), Mild Pain (1 - 3)  benzocaine 15 mG/menthol 3.6 mG Lozenge 1 Lozenge Oral three times a day PRN Sore Throat  guaiFENesin  milliGRAM(s) Oral every 12 hours PRN Cough  ondansetron Injectable 4 milliGRAM(s) IV Push every 6 hours PRN Nausea and/or Vomiting      Allergies    No Known Allergies    Intolerances        ROS:   General:  No wt loss, fevers, chills, night sweats, fatigue,   Eyes:  Good vision, no reported pain  ENT:  No sore throat, pain, runny nose, dysphagia  CV:  No pain, palpitations, hypo/hypertension  Resp:  No dyspnea, cough, tachypnea, wheezing  GI:  No pain, No nausea, No vomiting, No diarrhea, No constipation, No weight loss, No fever, No pruritis, No rectal bleeding, No tarry stools, No dysphagia,  :  No pain, bleeding, incontinence, nocturia  Muscle:  No pain, weakness  Neuro:  No weakness, tingling, memory problems  Psych:  No fatigue, insomnia, mood problems, depression  Endocrine:  No polyuria, polydipsia, cold/heat intolerance  Heme:  No petechiae, ecchymosis, easy bruisability  Skin:  No rash, tattoos, scars, edema      PHYSICAL EXAM:   Vital Signs Last 24 Hrs  T(C): 36.3 (2022 10:04), Max: 36.6 (2022 13:05)  T(F): 97.3 (2022 10:04), Max: 97.9 (2022 13:05)  HR: 90 (2022 10:04) (90 - 99)  BP: 110/75 (2022 10:04) (90/72 - 115/81)  BP(mean): --  RR: 18 (2022 10:04) (18 - 18)  SpO2: 97% (2022 10:04) (96% - 98%)    Parameters below as of 2022 10:04  Patient On (Oxygen Delivery Method): room air      Daily     Daily     GENERAL:  Appears stated age,   HEENT:  NC/AT,    CHEST:  Full & symmetric excursion,   HEART:  Regular rhythm,  ABDOMEN:  Soft, non-tender, non-distended,  EXTEREMITIES:  no cyanosis  SKIN:  No rash  NEURO:  Alert,       LABS:                        10.8   12.24 )-----------( 380      ( 2022 08:48 )             32.8   07-13    123<L>  |  98  |  10  ----------------------------<  72  3.9   |  13<L>  |  0.64    Ca    8.3<L>      2022 08:48  Phos  2.6     07-11  Mg     1.8     07-11    TPro  3.4<L>  /  Alb  1.2<L>  /  TBili  0.3  /  DBili  x   /  AST  30  /  ALT  28  /  AlkPhos  119  07-13        Urinalysis Basic - ( 10 Jul 2022 17:58 )    Color: Yellow / Appearance: Slightly Turbid / S.022 / pH: x  Gluc: x / Ketone: Negative  / Bili: Negative / Urobili: Negative   Blood: x / Protein: 30 mg/dL / Nitrite: Negative   Leuk Esterase: Large / RBC: 241 /hpf /  /HPF   Sq Epi: x / Non Sq Epi: 1 /hpf / Bacteria: Few        RADIOLOGY & ADDITIONAL TESTS:

## 2022-07-13 NOTE — CONSULT NOTE ADULT - ASSESSMENT
82 y/o F with PMH of HTN, chronic back and leg pain (wheelchair bound at baseline). Presents to ED c/o vomiting x 2 days. Found to have a 5mm L distal stone on CT scan - s/p cysto and L ureteral stent.  S/p course abx. Course c/b SOB and wheezing. CT chest with large R, moderate L pl effusions.  80 y/o F with PMH of HTN, chronic back and leg pain (wheelchair bound at baseline). Presents to ED c/o vomiting x 2 days. Found to have a 5mm L distal stone on CT scan - s/p cysto and L ureteral stent.  S/p course abx. Called to consult for CT chest findings with large R, moderate L pl effusions. CT A/P (lower chest) on admission with small b/l pl effusions.

## 2022-07-13 NOTE — CONSULT NOTE ADULT - PROBLEM SELECTOR RECOMMENDATION 9
CT chest with large R, moderate L pl effusions  -Effusions were small on CT A/P (lower chest) on admission  -Pt with no c/o dyspnea, breathing comfortably on RA. Will defer drainage at this time.   -Keep O>I as tolerated   -Keep sats >90% with O2 PRN
s/p stent  Cultures positive for enterococci s/p antibiotics

## 2022-07-14 NOTE — PROGRESS NOTE ADULT - ASSESSMENT
81 year old female with PMHx of HTN, chronic back and leg pain (wheelchair bound at baseline) presents to ED c/o vomiting x 2 days.   Found to have a 5mm L distal stone on CT scan. Pt denies history of stones, no flank pain, dysuria, urgency, frequency or gross hematuria.          Acute Hypoxic respiratory Failure  Fluid over load   pleural effusions   Hyponatremia       Patient Comfort care   Palliative care following   Discussed plan of care with patients Nephew   D/C planning to Home with [facility]

## 2022-07-14 NOTE — PROGRESS NOTE ADULT - SUBJECTIVE AND OBJECTIVE BOX
[facility]   MD ZANE SERRATO MD, DO KELLY CARIAS NP    TEL:  OFFICE: [phone]    From 5pm-7am Answering Service [phone]    -- RENAL FOLLOW UP NOTE ---Date of Service 07-14-22 @ 14:21    Patient is a 81y old  Female who presents with a chief complaint of vomiting (14 Jul 2022 11:09)      Patient seen and examined at bedside. No chest pain/sob    VITALS:  T(F): 97.5 (07-14-22 @ 13:36), Max: 97.9 (07-14-22 @ 10:11)  HR: 92 (07-14-22 @ 13:36)  BP: 111/75 (07-14-22 @ 13:36)  RR: 18 (07-14-22 @ 13:36)  SpO2: 96% (07-14-22 @ 13:36)  Wt(kg): --    07-13 @ 07:01  -  07-14 @ 07:00  --------------------------------------------------------  IN: 160 mL / OUT: 1250 mL / NET: -1090 mL    07-14 @ 07:01  -  07-14 @ 14:21  --------------------------------------------------------  IN: 125 mL / OUT: 700 mL / NET: -575 mL          PHYSICAL EXAM:  Constitutional: NAD  Neck: No JVD  Respiratory:  crackles  Cardiovascular: S1, S2, RRR  Gastrointestinal: BS+, soft, NT/ND  Extremities: + peripheral edema    Hospital Medications:   MEDICATIONS  (STANDING):  dextrose 5%. 1000 milliLiter(s) (100 mL/Hr) IV Continuous <Continuous>  dextrose 5%. 1000 milliLiter(s) (50 mL/Hr) IV Continuous <Continuous>  dextrose 50% Injectable 25 Gram(s) IV Push once  dextrose 50% Injectable 12.5 Gram(s) IV Push once  dextrose 50% Injectable 25 Gram(s) IV Push once  famotidine Injectable 20 milliGRAM(s) IV Push two times a day  glucagon  Injectable 1 milliGRAM(s) IntraMuscular once  heparin   Injectable 5000 Unit(s) SubCutaneous every 8 hours  metoclopramide 5 milliGRAM(s) Oral every 6 hours  polyethylene glycol 3350 17 Gram(s) Oral daily  senna 2 Tablet(s) Oral at bedtime  sodium bicarbonate 650 milliGRAM(s) Oral three times a day  sucralfate 1 Gram(s) Oral four times a day  tamsulosin 0.4 milliGRAM(s) Oral daily      LABS:  07-14    131<L>  |  103  |  8   ----------------------------<  57<L>  3.5   |  20<L>  |  0.67    Ca    8.4      14 Jul 2022 08:58    TPro  3.4<L>  /  Alb  1.2<L>  /  TBili  0.3  /  DBili      /  AST  30  /  ALT  28  /  AlkPhos  119  07-13    Creatinine Trend: 0.67 <--, 0.68 <--, 0.64 <--, 0.69 <--, 0.62 <--, 0.58 <--, 0.55 <--, 0.51 <--, 0.48 <--, 0.49 <--, 0.51 <--, 0.52 <--                                10.8   12.24 )-----------( 380      ( 13 Jul 2022 08:48 )             32.8     Urine Studies:  Urinalysis - [07-10-22 @ 17:58]      Color Yellow / Appearance Slightly Turbid / SG 1.022 / pH 6.0      Gluc Negative / Ketone Negative  / Bili Negative / Urobili Negative       Blood Large / Protein 30 mg/dL / Leuk Est Large / Nitrite Negative       /  / Hyaline 0 / Gran  / Sq Epi  / Non Sq Epi 1 / Bacteria Few    Urine Sodium 34      [07-10-22 @ 17:59]  Urine Osmolality 320      [07-10-22 @ 17:58]    TSH 6.11      [07-11-22 @ 12:19]        RADIOLOGY & ADDITIONAL STUDIES:

## 2022-07-14 NOTE — PROGRESS NOTE ADULT - SUBJECTIVE AND OBJECTIVE BOX
[facility]  Yannick Castillo PA-C  [facility] [place] [address]  [phone]      INTERVAL HPI/OVERNIGHT EVENTS:  pt seen and examined, no new events  GOC noted      MEDICATIONS  (STANDING):  famotidine Injectable 20 milliGRAM(s) IV Push two times a day  heparin   Injectable 5000 Unit(s) SubCutaneous every 8 hours  metoclopramide 5 milliGRAM(s) Oral every 6 hours  polyethylene glycol 3350 17 Gram(s) Oral daily  senna 2 Tablet(s) Oral at bedtime  sodium bicarbonate  Infusion 0.23 mEq/kG/Hr (250 mL/Hr) IV Continuous <Continuous>  sucralfate 1 Gram(s) Oral four times a day  tamsulosin 0.4 milliGRAM(s) Oral daily    MEDICATIONS  (PRN):  acetaminophen     Tablet .. 650 milliGRAM(s) Oral every 6 hours PRN Temp greater or equal to 38C (100.4F), Mild Pain (1 - 3)  benzocaine 15 mG/menthol 3.6 mG Lozenge 1 Lozenge Oral three times a day PRN Sore Throat  guaiFENesin  milliGRAM(s) Oral every 12 hours PRN Cough  ondansetron Injectable 4 milliGRAM(s) IV Push every 6 hours PRN Nausea and/or Vomiting      Allergies    No Known Allergies    Intolerances        ROS:   unable to obtain       PHYSICAL EXAM:   Vital Signs Last 24 Hrs  T(C): 36.6 (2022 10:11), Max: 36.6 (2022 10:11)  T(F): 97.9 (2022 10:11), Max: 97.9 (2022 10:11)  HR: 91 (2022 10:11) (89 - 100)  BP: 121/81 (2022 10:11) (101/70 - 137/77)  BP(mean): --  RR: 18 (2022 10:11) (18 - 18)  SpO2: 98% (2022 10:11) (97% - 99%)    Parameters below as of 2022 10:11  Patient On (Oxygen Delivery Method): room air      Daily     Daily     GENERAL:  Appears stated age,   HEENT:  NC/AT,    CHEST:  Full & symmetric excursion,   HEART:  Regular rhythm,  ABDOMEN:  Soft, non-tender, non-distended,  EXTEREMITIES:  no cyanosis  SKIN:  No rash  NEURO:  Alert,       LABS:                        10.8   12.24 )-----------( 380      ( 2022 08:48 )             32.8   07-13    123<L>  |  98  |  10  ----------------------------<  72  3.9   |  13<L>  |  0.64    Ca    8.3<L>      2022 08:48  Phos  2.6     07-11  Mg     1.8     07-11    TPro  3.4<L>  /  Alb  1.2<L>  /  TBili  0.3  /  DBili  x   /  AST  30  /  ALT  28  /  AlkPhos  119  07-13        Urinalysis Basic - ( 10 Jul 2022 17:58 )    Color: Yellow / Appearance: Slightly Turbid / S.022 / pH: x  Gluc: x / Ketone: Negative  / Bili: Negative / Urobili: Negative   Blood: x / Protein: 30 mg/dL / Nitrite: Negative   Leuk Esterase: Large / RBC: 241 /hpf /  /HPF   Sq Epi: x / Non Sq Epi: 1 /hpf / Bacteria: Few        RADIOLOGY & ADDITIONAL TESTS:

## 2022-07-14 NOTE — PROGRESS NOTE ADULT - ASSESSMENT
nausea/vomiting    cont diet as tolerated  cont with reglan 5mg  q6h  cont proton pump inhibitor  cont carafate  diet as tolerated  GOC noted  dc planning as per primary   will follow

## 2022-07-14 NOTE — PROGRESS NOTE ADULT - ASSESSMENT
11 year old female with PMHx of HTN, chronic back and leg pain (wheelchair bound at baseline), who presented with an obstructing kidney stone and rectal distention from stool, had a ureteral stent placed and has now finished 10 day of antibiotics.   More recently, she has been borderline hypoxic, and there was suggestion of volume overload, based on her exam and CXR.    - appears volume overloaded on exam, with anasarca  -CXR from 7/11 with vida effusions and pulmonary congestion  - got IV Lasix, but sodium trended down and on Tolvaptam.   - echocardiogram results noted-hyperdynamic lv with mod-sev lvot obstruction  -noting anasarca, albumin 1.2 and echo findings, would suggest that the edema relates to hypoalbuminemia and not an underlying cardiac process  -would not diurese aggressively, so as to avoid worsening lvot obstr  -would allow intravasc volume to increase a bit, and can consider the use of metoprolol 25 bid to avoid the hyperdynamic state and obstruction, if needed  -she did not have a murmur at the time of my exam; the degree of obstruction will vary with bp and volume, and is probably not meaningful at this time  - cont to trend creatinine and sodium closely  - ekgs have documented paf, though most likely a sr/st with with apcs. She does remain borderline tachycardic per flow sheets, and if continues, would put her on telemetry monitoring.  - further evaluation will depend on clinical course  - will follow with you

## 2022-07-14 NOTE — PROGRESS NOTE ADULT - SUBJECTIVE AND OBJECTIVE BOX
CC: f/u for uti, leukocytosis    Patient reports  she is ok  REVIEW OF SYSTEMS:  All other review of systems negative (Comprehensive ROS)    Antimicrobials Day #  :    Other Medications Reviewed    T(F): 97.5 (07-14-22 @ 13:36), Max: 97.9 (07-14-22 @ 10:11)  HR: 92 (07-14-22 @ 13:36)  BP: 111/75 (07-14-22 @ 13:36)  RR: 18 (07-14-22 @ 13:36)  SpO2: 96% (07-14-22 @ 13:36)  Wt(kg): --    PHYSICAL EXAM:  General:no acute distress  Eyes:  anicteric, no conjunctival injection, no discharge  Oropharynx: no lesions or injection 	  Neck: supple, without adenopathy  Lungs: decreased at bases  to auscultation  Heart: regular rate and rhythm; no murmur, rubs or gallops  Abdomen: soft, nondistended, nontender, without mass or organomegaly  Skin: no lesions  Extremities: no clubbing, cyanosis, . arms  edema  Neurologic: barely  moves all extremities    LAB RESULTS:                        10.8   12.24 )-----------( 380      ( 13 Jul 2022 08:48 )             32.8     07-14    131<L>  |  103  |  8   ----------------------------<  57<L>  3.5   |  20<L>  |  0.67    Ca    8.4      14 Jul 2022 08:58    TPro  3.4<L>  /  Alb  1.2<L>  /  TBili  0.3  /  DBili  x   /  AST  30  /  ALT  28  /  AlkPhos  119  07-13    LIVER FUNCTIONS - ( 13 Jul 2022 08:48 )  Alb: 1.2 g/dL / Pro: 3.4 g/dL / ALK PHOS: 119 U/L / ALT: 28 U/L / AST: 30 U/L / GGT: x             MICROBIOLOGY:  RECENT CULTURES:  07-11 @ 12:05 .Blood Blood     No growth to date.      07-11 @ 11:58 .Blood Blood     No growth to date.      07-10 @ 17:58 Clean Catch Clean Catch (Midstream)     10,000 - 49,000 CFU/mL Candida glabrata "Susceptibilities not performed"          RADIOLOGY REVIEWED:    < from: CT Chest No Cont (07.12.22 @ 20:34) >    ACC: [id number] EXAM:  CT CHEST                          PROCEDURE DATE:  07/12/2022          INTERPRETATION:  CLINICAL INFORMATION: Shortness of breath    COMPARISON: No prior study for comparison    CONTRAST/COMPLICATIONS:  IV Contrast: NONE  Oral Contrast: NONE  Complications: None reported at time of study completion    PROCEDURE:  CT scan of the chest was obtained without intravenous contrast. Sagittal   and coronal multiplanar reformations were submitted.    FINDINGS:    LYMPH NODES: No thoracic adenopathy    HEART/VASCULATURE: Heart size is normal. Small pericardial effusion.   Calcifications of the mitral annulus, aortic valve, aorta and coronary   arteries.    AIRWAYS/LUNGS/PLEURA: Central airways are patent. Expiratory motion   degraded study. Large pleural effusions. No pneumothorax.    UPPER ABDOMEN: Mild ascites. Small hiatal hernia. Partially visualized   left nephroureteral catheter.    BONES/SOFT TISSUES: Multilevel discogenic disease in the spine.   Osteopenia. Anasarca.    IMPRESSION:    Large pleural effusions. Small pericardial effusion. Mild ascites.   Anasarca.    --- End of Report ---            < end of copied text >            Assessment:  Elderly woman admitted for vomiting, found to have obstructing kidney stone, finished a 10 day course of antibiotics for urine isolates and had a stent placed. She had some wheezes a few days ago now improved but has bilateral effusions. she has just mild leukocytosis, urine with funguria but no indication for antifungal or antibiotics at present. Patient is being referred to hospice with no aggressive medical measures to be done and full comfort to be the goal of care  Plan:  monitor off antimicrobic  call us if further input is needed - - -

## 2022-07-14 NOTE — PROGRESS NOTE ADULT - ASSESSMENT
82 y/o F with PMH of HTN, chronic back and leg pain (wheelchair bound at baseline). Presents to ED c/o vomiting x 2 days. Found to have a 5mm L distal stone on CT scan - s/p cysto and L ureteral stent.  S/p course abx. Called to consult for CT chest findings with large R, moderate L pl effusions. CT A/P (lower chest) on admission with small b/l pl effusions.

## 2022-07-14 NOTE — PROGRESS NOTE ADULT - SUBJECTIVE AND OBJECTIVE BOX
Follow-up Pulm Progress Note    No new respiratory events overnight.  Denies SOB/CP.   Sats 97% RA    Medications:  MEDICATIONS  (STANDING):  dextrose 5%. 1000 milliLiter(s) (100 mL/Hr) IV Continuous <Continuous>  dextrose 5%. 1000 milliLiter(s) (50 mL/Hr) IV Continuous <Continuous>  dextrose 50% Injectable 25 Gram(s) IV Push once  dextrose 50% Injectable 12.5 Gram(s) IV Push once  dextrose 50% Injectable 25 Gram(s) IV Push once  famotidine Injectable 20 milliGRAM(s) IV Push two times a day  glucagon  Injectable 1 milliGRAM(s) IntraMuscular once  heparin   Injectable 5000 Unit(s) SubCutaneous every 8 hours  metoclopramide 5 milliGRAM(s) Oral every 6 hours  polyethylene glycol 3350 17 Gram(s) Oral daily  senna 2 Tablet(s) Oral at bedtime  sodium bicarbonate 650 milliGRAM(s) Oral three times a day  sucralfate 1 Gram(s) Oral four times a day  tamsulosin 0.4 milliGRAM(s) Oral daily    MEDICATIONS  (PRN):  acetaminophen     Tablet .. 650 milliGRAM(s) Oral every 6 hours PRN Temp greater or equal to 38C (100.4F), Mild Pain (1 - 3)  benzocaine 15 mG/menthol 3.6 mG Lozenge 1 Lozenge Oral three times a day PRN Sore Throat  dextrose Oral Gel 15 Gram(s) Oral once PRN Blood Glucose LESS THAN 70 milliGRAM(s)/deciliter  guaiFENesin  milliGRAM(s) Oral every 12 hours PRN Cough  ondansetron Injectable 4 milliGRAM(s) IV Push every 6 hours PRN Nausea and/or Vomiting          Vital Signs Last 24 Hrs  T(C): 36.6 (14 Jul 2022 10:11), Max: 36.6 (14 Jul 2022 10:11)  T(F): 97.9 (14 Jul 2022 10:11), Max: 97.9 (14 Jul 2022 10:11)  HR: 91 (14 Jul 2022 10:11) (89 - 100)  BP: 121/81 (14 Jul 2022 10:11) (101/70 - 137/77)  BP(mean): --  RR: 18 (14 Jul 2022 10:11) (18 - 18)  SpO2: 98% (14 Jul 2022 10:11) (97% - 99%)    Parameters below as of 14 Jul 2022 10:11  Patient On (Oxygen Delivery Method): room air              07-13 @ 07:01  -  07-14 @ 07:00  --------------------------------------------------------  IN: 160 mL / OUT: 1250 mL / NET: -1090 mL          LABS:                        10.8   12.24 )-----------( 380      ( 13 Jul 2022 08:48 )             32.8     07-14    131<L>  |  103  |  8   ----------------------------<  57<L>  3.5   |  20<L>  |  0.67    Ca    8.4      14 Jul 2022 08:58    TPro  3.4<L>  /  Alb  1.2<L>  /  TBili  0.3  /  DBili  x   /  AST  30  /  ALT  28  /  AlkPhos  119  07-13          CAPILLARY BLOOD GLUCOSE      POCT Blood Glucose.: 206 mg/dL (12 Jul 2022 13:18)          Serum Pro-Brain Natriuretic Peptide: 1512 pg/mL (07-12-22 @ 10:16)                CULTURES: (if applicable)    Culture - Blood (collected 07-11-22 @ 12:05)  Source: .Blood Blood  Preliminary Report (07-12-22 @ 17:02):    No growth to date.    Culture - Blood (collected 07-11-22 @ 11:58)  Source: .Blood Blood  Preliminary Report (07-12-22 @ 17:02):    No growth to date.        Culture - Urine (collected 07-10-22 @ 17:58)  Source: Clean Catch Clean Catch (Midstream)  Final Report (07-12-22 @ 16:00):    10,000 - 49,000 CFU/mL Candida glabrata "Susceptibilities not performed"              Physical Examination:  PULM: decreased BS  CVS: S1, S2 heard      RADIOLOGY REVIEWED    CT chest: < from: CT Chest No Cont (07.12.22 @ 20:34) >  FINDINGS:    LYMPH NODES: No thoracic adenopathy    HEART/VASCULATURE: Heart size is normal. Small pericardial effusion.   Calcifications of the mitral annulus, aortic valve, aorta and coronary   arteries.    AIRWAYS/LUNGS/PLEURA: Central airways are patent. Expiratory motion   degraded study. Large pleural effusions. No pneumothorax.    UPPER ABDOMEN: Mild ascites. Small hiatal hernia. Partially visualized   left nephroureteral catheter.    BONES/SOFT TISSUES: Multilevel discogenic disease in the spine.   Osteopenia. Anasarca.    IMPRESSION:    Large pleural effusions. Small pericardial effusion. Mild ascites.   Anasarca.    < end of copied text >      TTE: < from: Transthoracic Echocardiogram (07.13.22 @ 11:17) >  Dimensions:    Normal Values:  LA:     2.8    2.0 - 4.0 cm  Ao:     3.0    2.0 - 3.8 cm  SEPTUM: 1.1    0.6 - 1.2 cm  PWT:    1.10.6 - 1.1 cm  LVIDd:  2.4    3.0 - 5.6 cm  LVIDs:         1.8 - 4.0 cm  Derived variables:  LVMI: 38 g/m2  RWT: 0.91  EF (Single Plane Ellipsoid): 73 %Doppler Peak Velocity  (m/sec): AoV=1.4  ------------------------------------------------------------------------  Observations:  Mitral Valve: Mitral annular calcification and calcified  mitral leaflets with decreased diastolic opening. Mild  mitral regurgitation.  Peak mitral valve gradient equals 11  mm Hg, mean transmitral valve gradient equals3 mm Hg,  consistent with mild mitral stenosis. Gradients are  measured at a HR of 93bpm.  Aortic Valve/Aorta: Aortic valve not well visualized;  appears calcified. Peak transaortic valve gradient equals 8  mm Hg. No aortic valve regurgitation seen. Peak left  ventricular outflow tract gradient equals 57 mm Hg, mean  gradient is equal to 4 mm Hg, LVOT velocity time integral  equals 27 cm, consistent with moderately severe LVOT  obstruction.  Aortic Root: 3 cm.  Left Atrium: Normal left atrium.  Left Ventricle: Hyperdynamic left ventricular systolic  function. Normal left ventricular internal dimensions and  wall thicknesses. Mild diastolic dysfunction (Stage I).  Right Heart: Normal right atrium. Normal right ventricular  size and function. Normal tricuspid valve. Mild tricuspid  regurgitation. Pulmonic valve not well visualized, probably  normal.  Pericardium/Pleura: Small pericardial effusion. The  effusion measures 0.7cm circumferentially.  No evidence of  right atrial or right ventricular collapse. No  echocardiographic evidence of pericardial tamponade.  Hemodynamic: Estimated right atrial pressure is 8 mm Hg.  Estimated right ventricular systolic pressure equals 33 mm  Hg, assuming right atrial pressure equals 8 mm Hg,  consistent with normal pulmonary pressures.  ------------------------------------------------------------------------  Conclusions:  1. Mitral annular calcification and calcified mitral  leaflets with decreased diastolic opening. Mild mitral  regurgitation.  Peak mitral valve gradient equals 11 mm Hg,  mean transmitral valve gradient equals 3 mm Hg, consistent  with mild mitral stenosis. Gradients are measured at a HR  of 93bpm.  2. Aortic valve not well visualized; appears calcified. No  aortic valve regurgitation seen.  3. Hyperdynamic left ventricular systolic function. Peak  left ventricular outflow tract gradient equals 57 mm Hg,  mean gradient is equal to 4 mm Hg, LVOT velocity time  integral equals 27 cm, consistent with moderately severe  LVOTobstruction.  4. Mild diastolic dysfunction (Stage I).  5. Normal right ventricular size and function.  6. Small pericardial effusion. The effusion measures 0.7cm  circumferentially.  No evidence of right atrial or right  ventricular collapse. No echocardiographic evidence of  pericardial tamponade.  *** No previous Echo exam.    < end of copied text >

## 2022-07-14 NOTE — PROGRESS NOTE ADULT - SUBJECTIVE AND OBJECTIVE BOX
[facility]    Anderson Hernandez, Kenny, Reddy, Rocio, Juan, Lonnie      [phone]    CHIEF COMPLAINT: Patient is a 81y old  Female who presents with a chief complaint of vomiting (13 Jul 2022 18:25)      Follow Up: hypoxia, lvot obstr    Interim history: Unable to provide a history on the basis of a poor mental status.  Events noted.    MEDICATIONS  (STANDING):  dextrose 5%. 1000 milliLiter(s) (100 mL/Hr) IV Continuous <Continuous>  dextrose 5%. 1000 milliLiter(s) (50 mL/Hr) IV Continuous <Continuous>  dextrose 50% Injectable 25 Gram(s) IV Push once  dextrose 50% Injectable 12.5 Gram(s) IV Push once  dextrose 50% Injectable 25 Gram(s) IV Push once  famotidine Injectable 20 milliGRAM(s) IV Push two times a day  glucagon  Injectable 1 milliGRAM(s) IntraMuscular once  heparin   Injectable 5000 Unit(s) SubCutaneous every 8 hours  metoclopramide 5 milliGRAM(s) Oral every 6 hours  polyethylene glycol 3350 17 Gram(s) Oral daily  senna 2 Tablet(s) Oral at bedtime  sodium bicarbonate 650 milliGRAM(s) Oral three times a day  sucralfate 1 Gram(s) Oral four times a day  tamsulosin 0.4 milliGRAM(s) Oral daily    MEDICATIONS  (PRN):  acetaminophen     Tablet .. 650 milliGRAM(s) Oral every 6 hours PRN Temp greater or equal to 38C (100.4F), Mild Pain (1 - 3)  benzocaine 15 mG/menthol 3.6 mG Lozenge 1 Lozenge Oral three times a day PRN Sore Throat  dextrose Oral Gel 15 Gram(s) Oral once PRN Blood Glucose LESS THAN 70 milliGRAM(s)/deciliter  guaiFENesin  milliGRAM(s) Oral every 12 hours PRN Cough  ondansetron Injectable 4 milliGRAM(s) IV Push every 6 hours PRN Nausea and/or Vomiting      REVIEW OF SYSTEMS: unable to provide  Vital Signs Last 24 Hrs  T(C): 36.6 (14 Jul 2022 10:11), Max: 36.6 (14 Jul 2022 10:11)  T(F): 97.9 (14 Jul 2022 10:11), Max: 97.9 (14 Jul 2022 10:11)  HR: 91 (14 Jul 2022 10:11) (89 - 100)  BP: 121/81 (14 Jul 2022 10:11) (101/70 - 137/77)  BP(mean): --  RR: 18 (14 Jul 2022 10:11) (18 - 18)  SpO2: 98% (14 Jul 2022 10:11) (97% - 99%)    Parameters below as of 14 Jul 2022 10:11  Patient On (Oxygen Delivery Method): room air        I&O's Summary    13 Jul 2022 07:01  -  14 Jul 2022 07:00  --------------------------------------------------------  IN: 160 mL / OUT: 1250 mL / NET: -1090 mL    14 Jul 2022 07:01  -  14 Jul 2022 10:14  --------------------------------------------------------  IN: 50 mL / OUT: 0 mL / NET: 50 mL        Telemetry past 24h:    PHYSICAL EXAM:    Constitutional: well-nourished, well-developed, NAD   HEENT:  MMM, sclerae anicteric, conjunctivae clear, no oral cyanosis.  Pulmonary: Non-labored, breath sounds are clear bilaterally, No wheezing, rales or rhonchi  Cardiovascular: Regular, S1 and S2.  No murmur.  No rubs, gallops or clicks  Gastrointestinal: Bowel Sounds present, soft, nontender.   Lymph: anasarca  Neurological: unable to evaluate, poor mental status  Skin: No rashes.  Psych:  Mood & affect not evaluable    LABS: All Labs Reviewed:                        10.8   12.24 )-----------( 380      ( 13 Jul 2022 08:48 )             32.8                         10.8   16.52 )-----------( 404      ( 12 Jul 2022 10:16 )             29.5                         12.0   14.66 )-----------( 619      ( 11 Jul 2022 12:19 )             33.9     14 Jul 2022 08:58    131    |  103    |  8      ----------------------------<  57     3.5     |  20     |  0.67   13 Jul 2022 17:10    126    |  99     |  9      ----------------------------<  85     3.5     |  18     |  0.68   13 Jul 2022 08:48    123    |  98     |  10     ----------------------------<  72     3.9     |  13     |  0.64     Ca    8.4        14 Jul 2022 08:58  Ca    8.5        13 Jul 2022 17:10  Ca    8.3        13 Jul 2022 08:48  Phos  2.6       11 Jul 2022 12:19  Mg     1.8       11 Jul 2022 12:19    TPro  3.4    /  Alb  1.2    /  TBili  0.3    /  DBili  x      /  AST  30     /  ALT  28     /  AlkPhos  119    13 Jul 2022 08:48          Blood Culture: Organism --  Gram Stain Blood -- Gram Stain --  Specimen Source .Blood Blood  Culture-Blood --    Organism --  Gram Stain Blood -- Gram Stain --  Specimen Source .Blood Blood  Culture-Blood --    Organism --  Gram Stain Blood -- Gram Stain --  Specimen Source Clean Catch Clean Catch (Midstream)  Culture-Blood --      07-12 @ 10:16  Pro Bnp 1512 07-11 @ 12:19  TSH: 6.11      RADIOLOGY:    EKG:    Echo:    < from: Transthoracic Echocardiogram (07.13.22 @ 11:17) >    Patient name: OMAR HORAN  YOB: 1940   Age: 81 (F)   MR#: [id number]  Study Date: 7/13/2022  Location: [address] [place], [place] [id number]: Donavon Reyna RDCS  Study quality: Technically difficult  Referring Physician: Katherin Sims MD  Blood Pressure: 107/73 mmHg  Height: 160 cm  Weight: 82 kg  BSA: 1.9 m2  ------------------------------------------------------------------------  PROCEDURE: Transthoracic echocardiogram with 2-D, M-Mode  and complete spectral and color flow Doppler.  INDICATION: Heart failure, unspecified (I50.9)  ------------------------------------------------------------------------  Dimensions:    Normal Values:  LA:     2.8    2.0 - 4.0 cm  Ao:     3.0    2.0 - 3.8 cm  SEPTUM: 1.1    0.6 - 1.2 cm  PWT:    1.10.6 - 1.1 cm  LVIDd:  2.4    3.0 - 5.6 cm  LVIDs:         1.8 - 4.0 cm  Derived variables:  LVMI: 38 g/m2  RWT: 0.91  EF (Single Plane Ellipsoid): 73 %Doppler Peak Velocity  (m/sec): AoV=1.4  ------------------------------------------------------------------------  Observations:  Mitral Valve: Mitral annular calcification and calcified  mitral leaflets with decreased diastolic opening. Mild  mitral regurgitation.  Peak mitral valve gradient equals 11  mm Hg, mean transmitral valve gradient equals3 mm Hg,  consistent with mild mitral stenosis. Gradients are  measured at a HR of 93bpm.  Aortic Valve/Aorta: Aortic valve not well visualized;  appears calcified. Peak transaortic valve gradient equals 8  mm Hg. No aortic valve regurgitation seen. Peak left  ventricular outflow tract gradient equals 57 mm Hg, mean  gradient is equal to 4 mm Hg, LVOT velocity time integral  equals 27 cm, consistent with moderately severe LVOT  obstruction.  Aortic Root: 3 cm.  Left Atrium: Normal left atrium.  Left Ventricle: Hyperdynamic left ventricular systolic  function. Normal left ventricular internal dimensions and  wall thicknesses. Mild diastolic dysfunction (Stage I).  Right Heart: Normal right atrium. Normal right ventricular  size and function. Normal tricuspid valve. Mild tricuspid  regurgitation. Pulmonic valve not well visualized, probably  normal.  Pericardium/Pleura: Small pericardial effusion. The  effusion measures 0.7cm circumferentially.  No evidence of  right atrial or right ventricular collapse. No  echocardiographic evidence of pericardial tamponade.  Hemodynamic: Estimated right atrial pressure is 8 mm Hg.  Estimated right ventricular systolic pressure equals 33 mm  Hg, assuming right atrial pressure equals 8 mm Hg,  consistent with normal pulmonary pressures.  ------------------------------------------------------------------------  Conclusions:  1. Mitral annular calcification and calcified mitral  leaflets with decreased diastolic opening. Mild mitral  regurgitation.  Peak mitral valve gradient equals 11 mm Hg,  mean transmitral valve gradient equals 3 mm Hg, consistent  with mild mitral stenosis. Gradients are measured at a HR  of 93bpm.  2. Aortic valve not well visualized; appears calcified. No  aortic valve regurgitation seen.  3. Hyperdynamic left ventricular systolic function. Peak  left ventricular outflow tract gradient equals 57 mm Hg,  mean gradient is equal to 4 mm Hg, LVOT velocity time  integral equals 27 cm, consistent with moderately severe  LVOTobstruction.  4. Mild diastolic dysfunction (Stage I).  5. Normal right ventricular size and function.  6. Small pericardial effusion. The effusion measures 0.7cm  circumferentially.  No evidence of right atrial or right  ventricular collapse. No echocardiographic evidence of  pericardial tamponade.  *** No previous Echo exam.  ------------------------------------------------------------------------  Confirmed on  7/13/2022 - 13:08:07 by Isrrael Hook M.D.  ------------------------------------------------------------------------    < end of copied text >

## 2022-07-14 NOTE — PROGRESS NOTE ADULT - ASSESSMENT
81 year old female with PMHx of HTN, chronic back and leg pain (wheelchair bound at baseline) presents to ED c/o vomiting x 2 days. Found to have a 5mm L distal stone on CT scan. S/P left ureteral stent, has UTI getting abx, has been having nausea/vomiting GI on board, has persistent hyponatremia    A/P:  Hyponatremia:  Etiology?  clinically hypervolemic  s/p lasix 40mg IV 07/13/22  s/p tolvaptan 15mg 07/13/22  urine study is inconclusive   clinically hypervolemic   resume fluid restriction <1L a day   Na should not increase more than 6-8meq in 24hrs  monitor BMP closely     Acidosis:  Non-AG  start oral bicarb 650mg tid   monitor     Hypocalcemia:  sec to low albumin 81 year old female with PMHx of HTN, chronic back and leg pain (wheelchair bound at baseline) presents to ED c/o vomiting x 2 days. Found to have a 5mm L distal stone on CT scan. S/P left ureteral stent, has UTI getting abx, has been having nausea/vomiting GI on board, has persistent hyponatremia    A/P:  Hyponatremia:  Etiology?  clinically hypervolemic  s/p lasix 40mg IV 07/13/22  s/p tolvaptan 15mg 07/13/22  urine study is inconclusive   clinically hypervolemic - give lasix 40mg IV today   resume fluid restriction <1L a day   Na should not increase more than 6-8meq in 24hrs  monitor BMP closely     Acidosis:  Non-AG  improving   continue oral bicarb 650mg tid   monitor     Hypocalcemia:  sec to low albumin

## 2022-07-15 NOTE — PROGRESS NOTE ADULT - ASSESSMENT
nausea/vomiting    cont diet as tolerated  cont with reglan 5mg  q6h  cont proton pump inhibitor  cont carafate  diet as tolerated  GOC noted  dc planning with home hospice as per primary

## 2022-07-15 NOTE — PROGRESS NOTE ADULT - ASSESSMENT
81 year old female with PMHx of HTN, chronic back and leg pain (wheelchair bound at baseline) presents to ED c/o vomiting x 2 days. Found to have a 5mm L distal stone on CT scan. S/P left ureteral stent, has UTI getting abx, has been having nausea/vomiting GI on board, has persistent hyponatremia    A/P:  Hyponatremia:  Etiology?  urine study is inconclusive   Na improved appropriately   continue fluid restriction <1L a day   Na should not increase more than 6-8meq in 24hrs  monitor BMP closely     Acidosis:  Non-AG  continue oral bicarb 650mg tid   monitor     Hypocalcemia:  sec to low albumin 81 year old female with PMHx of HTN, chronic back and leg pain (wheelchair bound at baseline) presents to ED c/o vomiting x 2 days. Found to have a 5mm L distal stone on CT scan. S/P left ureteral stent, has UTI getting abx, has been having nausea/vomiting GI on board, has persistent hyponatremia    A/P:  Hyponatremia:  Etiology?  urine study is inconclusive   Na improved appropriately   hypervolemic - diuretics per cardiology   continue fluid restriction <1L a day   Na should not increase more than 6-8meq in 24hrs  monitor BMP closely     Acidosis:  Non-AG  continue oral bicarb 650mg tid   monitor     Hypocalcemia:  sec to low albumin

## 2022-07-15 NOTE — PROGRESS NOTE ADULT - SUBJECTIVE AND OBJECTIVE BOX
[facility]  Yannick Castillo PA-C  [address] [place] [address]  [phone]      INTERVAL HPI/OVERNIGHT EVENTS:  pt seen and examined, no new events      MEDICATIONS  (STANDING):  famotidine Injectable 20 milliGRAM(s) IV Push two times a day  heparin   Injectable 5000 Unit(s) SubCutaneous every 8 hours  metoclopramide 5 milliGRAM(s) Oral every 6 hours  polyethylene glycol 3350 17 Gram(s) Oral daily  senna 2 Tablet(s) Oral at bedtime  sodium bicarbonate  Infusion 0.23 mEq/kG/Hr (250 mL/Hr) IV Continuous <Continuous>  sucralfate 1 Gram(s) Oral four times a day  tamsulosin 0.4 milliGRAM(s) Oral daily    MEDICATIONS  (PRN):  acetaminophen     Tablet .. 650 milliGRAM(s) Oral every 6 hours PRN Temp greater or equal to 38C (100.4F), Mild Pain (1 - 3)  benzocaine 15 mG/menthol 3.6 mG Lozenge 1 Lozenge Oral three times a day PRN Sore Throat  guaiFENesin  milliGRAM(s) Oral every 12 hours PRN Cough  ondansetron Injectable 4 milliGRAM(s) IV Push every 6 hours PRN Nausea and/or Vomiting      Allergies    No Known Allergies    Intolerances        ROS:   unable to obtain       PHYSICAL EXAM:   Vital Signs Last 24 Hrs  T(C): 36.4 (15 Jul 2022 05:00), Max: 36.6 (2022 10:11)  T(F): 97.6 (15 Jul 2022 05:00), Max: 97.9 (2022 10:11)  HR: 89 (15 Jul 2022 05:00) (87 - 92)  BP: 122/76 (15 Jul 2022 05:00) (106/74 - 132/83)  BP(mean): --  RR: 16 (15 Jul 2022 05:00) (16 - 18)  SpO2: 96% (15 Jul 2022 05:00) (96% - 98%)    Parameters below as of 15 Jul 2022 05:00  Patient On (Oxygen Delivery Method): room air      Daily     Daily     GENERAL:  Appears stated age,   HEENT:  NC/AT,    CHEST:  Full & symmetric excursion,   HEART:  Regular rhythm,  ABDOMEN:  Soft, non-tender, non-distended,  EXTEREMITIES:  no cyanosis  SKIN:  No rash  NEURO:  Alert,       LABS:      131<L>  |  103  |  8   ----------------------------<  57<L>  3.5   |  20<L>  |  0.67    Ca    8.4      2022 08:58      Urinalysis Basic - ( 10 Jul 2022 17:58 )    Color: Yellow / Appearance: Slightly Turbid / S.022 / pH: x  Gluc: x / Ketone: Negative  / Bili: Negative / Urobili: Negative   Blood: x / Protein: 30 mg/dL / Nitrite: Negative   Leuk Esterase: Large / RBC: 241 /hpf /  /HPF   Sq Epi: x / Non Sq Epi: 1 /hpf / Bacteria: Few        RADIOLOGY & ADDITIONAL TESTS:

## 2022-07-15 NOTE — PROGRESS NOTE ADULT - SUBJECTIVE AND OBJECTIVE BOX
[facility] - Anderson Hernandez, Kenny, Reddy, Rocio, Lonnie Martinez  Office Number:  [phone]    Patient resting comfortably in bed in NAD.  Laying flat with no respiratory distress.  No complaints of chest pain, dyspnea, palpitations, PND, or orthopnea.  says she is doing ok    ROS: negative unless otherwise mentioned.    Telemetry:  off    MEDICATIONS  (STANDING):  dextrose 5%. 1000 milliLiter(s) (50 mL/Hr) IV Continuous <Continuous>  dextrose 5%. 1000 milliLiter(s) (100 mL/Hr) IV Continuous <Continuous>  dextrose 50% Injectable 25 Gram(s) IV Push once  dextrose 50% Injectable 12.5 Gram(s) IV Push once  dextrose 50% Injectable 25 Gram(s) IV Push once  famotidine Injectable 20 milliGRAM(s) IV Push two times a day  glucagon  Injectable 1 milliGRAM(s) IntraMuscular once  heparin   Injectable 5000 Unit(s) SubCutaneous every 8 hours  metoclopramide 5 milliGRAM(s) Oral every 6 hours  nystatin Powder 1 Application(s) Topical two times a day  polyethylene glycol 3350 17 Gram(s) Oral daily  senna 2 Tablet(s) Oral at bedtime  sodium bicarbonate 650 milliGRAM(s) Oral three times a day  sucralfate 1 Gram(s) Oral four times a day  tamsulosin 0.4 milliGRAM(s) Oral daily    MEDICATIONS  (PRN):  acetaminophen     Tablet .. 650 milliGRAM(s) Oral every 6 hours PRN Temp greater or equal to 38C (100.4F), Mild Pain (1 - 3)  benzocaine 15 mG/menthol 3.6 mG Lozenge 1 Lozenge Oral three times a day PRN Sore Throat  dextrose Oral Gel 15 Gram(s) Oral once PRN Blood Glucose LESS THAN 70 milliGRAM(s)/deciliter  guaiFENesin  milliGRAM(s) Oral every 12 hours PRN Cough  ondansetron Injectable 4 milliGRAM(s) IV Push every 6 hours PRN Nausea and/or Vomiting      Allergies    No Known Allergies    Intolerances        Vital Signs Last 24 Hrs  T(C): 36.4 (15 Jul 2022 05:00), Max: 36.6 (14 Jul 2022 10:11)  T(F): 97.6 (15 Jul 2022 05:00), Max: 97.9 (14 Jul 2022 10:11)  HR: 89 (15 Jul 2022 05:00) (87 - 92)  BP: 122/76 (15 Jul 2022 05:00) (106/74 - 132/83)  BP(mean): --  RR: 16 (15 Jul 2022 05:00) (16 - 18)  SpO2: 96% (15 Jul 2022 05:00) (96% - 98%)    Parameters below as of 15 Jul 2022 05:00  Patient On (Oxygen Delivery Method): room air        I&O's Summary    14 Jul 2022 07:01  -  15 Jul 2022 07:00  --------------------------------------------------------  IN: 415 mL / OUT: 1800 mL / NET: -1385 mL        ON EXAM:  Constitutional: well-nourished, well-developed, NAD   HEENT:  MMM, sclerae anicteric, conjunctivae clear, no oral cyanosis.  Pulmonary: Non-labored, breath sounds are clear bilaterally, No wheezing, rales or rhonchi  Cardiovascular: Regular, S1 and S2.  No murmur.  No rubs, gallops or clicks  Gastrointestinal: Bowel Sounds present, soft, nontender.   Lymph: anasarca  Neurological: unable to evaluate, poor mental status  Skin: No rashes.  Psych:  Mood & affect not evaluable  LABS: All Labs Reviewed:                        10.8   12.24 )-----------( 380      ( 13 Jul 2022 08:48 )             32.8                         10.8   16.52 )-----------( 404      ( 12 Jul 2022 10:16 )             29.5     14 Jul 2022 08:58    131    |  103    |  8      ----------------------------<  57     3.5     |  20     |  0.67   13 Jul 2022 17:10    126    |  99     |  9      ----------------------------<  85     3.5     |  18     |  0.68   13 Jul 2022 08:48    123    |  98     |  10     ----------------------------<  72     3.9     |  13     |  0.64     Ca    8.4        14 Jul 2022 08:58  Ca    8.5        13 Jul 2022 17:10  Ca    8.3        13 Jul 2022 08:48    TPro  3.4    /  Alb  1.2    /  TBili  0.3    /  DBili  x      /  AST  30     /  ALT  28     /  AlkPhos  119    13 Jul 2022 08:48          Blood Culture: Organism --  Gram Stain Blood -- Gram Stain --  Specimen Source .Blood Blood  Culture-Blood --    Organism --  Gram Stain Blood -- Gram Stain --  Specimen Source .Blood Blood  Culture-Blood --    Organism --  Gram Stain Blood -- Gram Stain --  Specimen Source Clean Catch Clean Catch (Midstream)  Culture-Blood --      07-12 @ 10:16  Pro Bnp 1510

## 2022-07-15 NOTE — PROGRESS NOTE ADULT - SUBJECTIVE AND OBJECTIVE BOX
[facility]-- WOUND TEAM -- FOLLOW UP NOTE  --------------------------------------------------------------------------------    24 hour events/subjective:    afebrile  tolerating po w/ occasional nausea  weak- awaiting dc home w/ hospice  incontinent  no c/o pain, odor, excess drainage      ROS: General/ SKIN/ GI see HPI  all other systems negative    ALLERGIES & MEDICATIONS  --------------------------------------------------------------------------------    No Known Allergies    STANDING INPATIENT MEDICATIONS  dextrose 5%. 1000 milliLiter(s) IV Continuous <Continuous>  dextrose 5%. 1000 milliLiter(s) IV Continuous <Continuous>  dextrose 50% Injectable 25 Gram(s) IV Push once  dextrose 50% Injectable 12.5 Gram(s) IV Push once  dextrose 50% Injectable 25 Gram(s) IV Push once  famotidine Injectable 20 milliGRAM(s) IV Push two times a day  glucagon  Injectable 1 milliGRAM(s) IntraMuscular once  heparin   Injectable 5000 Unit(s) SubCutaneous every 8 hours  metoclopramide 5 milliGRAM(s) Oral every 6 hours  nystatin Powder 1 Application(s) Topical two times a day  polyethylene glycol 3350 17 Gram(s) Oral daily  senna 2 Tablet(s) Oral at bedtime  sodium bicarbonate 650 milliGRAM(s) Oral three times a day  sucralfate 1 Gram(s) Oral four times a day  tamsulosin 0.4 milliGRAM(s) Oral daily      PRN INPATIENT MEDICATION  acetaminophen     Tablet .. 650 milliGRAM(s) Oral every 6 hours PRN  benzocaine 15 mG/menthol 3.6 mG Lozenge 1 Lozenge Oral three times a day PRN  dextrose Oral Gel 15 Gram(s) Oral once PRN  guaiFENesin  milliGRAM(s) Oral every 12 hours PRN  ondansetron Injectable 4 milliGRAM(s) IV Push every 6 hours PRN        VITALS/PHYSICAL EXAM  --------------------------------------------------------------------------------  T(C): 36.3 (07-15-22 @ 12:57), Max: 36.4 (07-14-22 @ 21:36)  HR: 88 (07-15-22 @ 12:57) (87 - 92)  BP: 124/83 (07-15-22 @ 12:57) (106/74 - 132/83)  RR: 16 (07-15-22 @ 12:57) (16 - 18)  SpO2: 96% (07-15-22 @ 12:57) (96% - 98%)  Wt(kg): --        07-14-22 @ 07:01  -  07-15-22 @ 07:00  --------------------------------------------------------  IN: 415 mL / OUT: 1800 mL / NET: -1385 mL    07-15-22 @ 07:01  -  07-15-22 @ 16:39  --------------------------------------------------------  IN: 200 mL / OUT: 150 mL / NET: 50 mL        NAD,  A&Ox3, Obese, frail  [facility] P500 bed  HEENT:  NC/AT, EOMI, sclera clear, mucosa moist, throat clear, trachea midline, neck supple  Neurology:  weakened strength & sensation grossly intact  Psych: calm/ appropriate  Musculoskeletal: FROM, no deformities/ contractures  Vascular: BLE equally warm,  no cyanosis, clubbing,  nor acute ischemia noted       mild BLE edema equal, faint BLE DP/PT pulses palpable      BLE hemosiderin staining      Lt heel w/ resolving dry blister / dti 4cm x 3cm x 0cm      Rt lateral calf wound 8cmx 2cm x 0.4cm w/ moist and granular tissue and            less necrotic tissue, distal 3rd w/ DTI w/o blistering      (+)serosanguinous drainage  No odor, erythema, increased warmth, tenderness, induration, fluctuance, nor crepitus  Skin: pale, frail,  ecchymosis w/o hematoma      Bilateral buttocks/ sacrum w/ hyperpigmentation of incontinence dermatitis      Rt sacrum DTI w/ denuded skin        3cm x 2cm x 0cm        no active drainage  No odor, erythema, increased warmth, tenderness, induration, fluctuance, nor crepitus      LABS/ CULTURES/ RADIOLOGY:    137  |  101  |  37  ----------------------------<  127      [07-15-22 @ 12:10]  4.5   |  19  |  1.19        Ca     9.0     [07-15-22 @ 12:10]        A1C with Estimated Average Glucose Result: 4.9 % (07-13-22 @ 08:48)      Culture - Blood (collected 07-11-22 @ 12:05)  Source: .Blood Blood  Preliminary Report (07-12-22 @ 17:02):    No growth to date.    Culture - Blood (collected 07-11-22 @ 11:58)  Source: .Blood Blood  Preliminary Report (07-12-22 @ 17:02):    No growth to date.

## 2022-07-15 NOTE — PROGRESS NOTE ADULT - SUBJECTIVE AND OBJECTIVE BOX
HPI:  81 year old female with PMHx of HTN, chronic back and leg pain (wheelchair bound at baseline) presents to ED c/o vomiting x 2 days. Today patient had wound care appointment for pressure wound to her R calf and vomited again so was brought in by aide. Found to have a 5mm L distal stone on CT scan. Pt denies history of stones, no flank pain, dysuria, urgency, frequency or gross hematuria.  (30 Jun 2022 17:15)        Patient in respiratory Distress Hypoxic     T(C): 36.3 (07-15-22 @ 21:47), Max: 36.3 (07-15-22 @ 12:57)  HR: 86 (07-15-22 @ 21:47) (86 - 91)  BP: 122/74 (07-15-22 @ 21:47) (107/70 - 124/83)  RR: 18 (07-15-22 @ 21:47) (16 - 18)  SpO2: 95% (07-15-22 @ 21:47) (95% - 96%)      MEDICATIONS  (STANDING):  dextrose 5%. 1000 milliLiter(s) (50 mL/Hr) IV Continuous <Continuous>  dextrose 5%. 1000 milliLiter(s) (100 mL/Hr) IV Continuous <Continuous>  dextrose 50% Injectable 25 Gram(s) IV Push once  dextrose 50% Injectable 12.5 Gram(s) IV Push once  dextrose 50% Injectable 25 Gram(s) IV Push once  famotidine Injectable 20 milliGRAM(s) IV Push two times a day  glucagon  Injectable 1 milliGRAM(s) IntraMuscular once  heparin   Injectable 5000 Unit(s) SubCutaneous every 8 hours  metoclopramide 5 milliGRAM(s) Oral every 6 hours  nystatin Powder 1 Application(s) Topical two times a day  polyethylene glycol 3350 17 Gram(s) Oral daily  senna 2 Tablet(s) Oral at bedtime  sodium bicarbonate 650 milliGRAM(s) Oral three times a day  sucralfate 1 Gram(s) Oral four times a day  tamsulosin 0.4 milliGRAM(s) Oral daily    MEDICATIONS  (PRN):  acetaminophen     Tablet .. 650 milliGRAM(s) Oral every 6 hours PRN Temp greater or equal to 38C (100.4F), Mild Pain (1 - 3)  benzocaine 15 mG/menthol 3.6 mG Lozenge 1 Lozenge Oral three times a day PRN Sore Throat  dextrose Oral Gel 15 Gram(s) Oral once PRN Blood Glucose LESS THAN 70 milliGRAM(s)/deciliter  guaiFENesin  milliGRAM(s) Oral every 12 hours PRN Cough  ondansetron Injectable 4 milliGRAM(s) IV Push every 6 hours PRN Nausea and/or Vomiting    Left nephrolithiasis          Review of Systems:   CONSTITUTIONAL: No fever, weight loss, or fatigue  EYES: No eye pain, visual disturbances, or discharge  ENMT:  No difficulty hearing, tinnitus, vertigo; No sinus or throat pain  NECK: No pain or stiffness  BREASTS: No pain, masses, or nipple discharge  RESPIRATORY: No cough, wheezing, chills or hemoptysis; No shortness of breath  CARDIOVASCULAR: No chest pain, palpitations, dizziness, or leg swelling  GASTROINTESTINAL: No abdominal or epigastric pain. No nausea, vomiting, or hematemesis; No diarrhea or constipation. No melena or hematochezia.  GENITOURINARY: No dysuria, frequency, hematuria, or incontinence  NEUROLOGICAL: No headaches, memory loss, loss of strength, numbness, or tremors  SKIN: No itching, burning, rashes, or lesions   LYMPH NODES: No enlarged glands  ENDOCRINE: No heat or cold intolerance; No hair loss  MUSCULOSKELETAL: No joint pain or swelling; No muscle, back, or extremity pain  PSYCHIATRIC: No depression, anxiety, mood swings, or difficulty sleeping  HEME/LYMPH: No easy bruising, or bleeding gums  ALLERY AND IMMUNOLOGIC: No hives or eczema    Allergies    No Known Allergies    Intolerances  PHYSICAL EXAM:  GENERAL: NAD, well-developed  HEAD:  Atraumatic, Normocephalic  EYES: EOMI, PERRLA, conjunctiva and sclera clear  NECK: Supple, No JVD  CHEST/LUNG: Clear to auscultation bilaterally; No wheeze  HEART: Regular rate and rhythm; No murmurs, rubs, or gallops  ABDOMEN: Soft, Nontender, Nondistended; Bowel sounds present  EXTREMITIES:  2+ Peripheral Pulses, No clubbing, cyanosis, or edema  PSYCH: AAOx3  NEUROLOGY: non-focal  SKIN: No rashes or lesions                                         137|101|37<127  4.5|19|1.19  9.0,--,--  07-15 @ 12:10

## 2022-07-15 NOTE — PROGRESS NOTE ADULT - ASSESSMENT
80 y/o F with PMH of HTN, chronic back and leg pain (wheelchair bound at baseline). Presents to ED c/o vomiting x 2 days. Found to have a 5mm L distal stone on CT scan - s/p cysto and L ureteral stent.  S/p course abx. Called to consult for CT chest findings with large R, moderate L pl effusions. CT A/P (lower chest) on admission with small b/l pl effusions.

## 2022-07-15 NOTE — PROGRESS NOTE ADULT - SUBJECTIVE AND OBJECTIVE BOX
Follow-up Pulm Progress Note    No new respiratory events overnight.  Denies SOB/CP.     Medications:  MEDICATIONS  (STANDING):  dextrose 5%. 1000 milliLiter(s) (50 mL/Hr) IV Continuous <Continuous>  dextrose 5%. 1000 milliLiter(s) (100 mL/Hr) IV Continuous <Continuous>  dextrose 50% Injectable 25 Gram(s) IV Push once  dextrose 50% Injectable 12.5 Gram(s) IV Push once  dextrose 50% Injectable 25 Gram(s) IV Push once  famotidine Injectable 20 milliGRAM(s) IV Push two times a day  glucagon  Injectable 1 milliGRAM(s) IntraMuscular once  heparin   Injectable 5000 Unit(s) SubCutaneous every 8 hours  metoclopramide 5 milliGRAM(s) Oral every 6 hours  nystatin Powder 1 Application(s) Topical two times a day  polyethylene glycol 3350 17 Gram(s) Oral daily  senna 2 Tablet(s) Oral at bedtime  sodium bicarbonate 650 milliGRAM(s) Oral three times a day  sucralfate 1 Gram(s) Oral four times a day  tamsulosin 0.4 milliGRAM(s) Oral daily    MEDICATIONS  (PRN):  acetaminophen     Tablet .. 650 milliGRAM(s) Oral every 6 hours PRN Temp greater or equal to 38C (100.4F), Mild Pain (1 - 3)  benzocaine 15 mG/menthol 3.6 mG Lozenge 1 Lozenge Oral three times a day PRN Sore Throat  dextrose Oral Gel 15 Gram(s) Oral once PRN Blood Glucose LESS THAN 70 milliGRAM(s)/deciliter  guaiFENesin  milliGRAM(s) Oral every 12 hours PRN Cough  ondansetron Injectable 4 milliGRAM(s) IV Push every 6 hours PRN Nausea and/or Vomiting          Vital Signs Last 24 Hrs  T(C): 36.4 (15 Jul 2022 05:00), Max: 36.4 (14 Jul 2022 13:36)  T(F): 97.6 (15 Jul 2022 05:00), Max: 97.6 (14 Jul 2022 21:36)  HR: 89 (15 Jul 2022 05:00) (87 - 92)  BP: 122/76 (15 Jul 2022 05:00) (106/74 - 132/83)  BP(mean): --  RR: 16 (15 Jul 2022 05:00) (16 - 18)  SpO2: 96% (15 Jul 2022 05:00) (96% - 98%)    Parameters below as of 15 Jul 2022 05:00  Patient On (Oxygen Delivery Method): room air              07-14 @ 07:01  -  07-15 @ 07:00  --------------------------------------------------------  IN: 415 mL / OUT: 1800 mL / NET: -1385 mL          LABS:    07-14    131<L>  |  103  |  8   ----------------------------<  57<L>  3.5   |  20<L>  |  0.67    Ca    8.4      14 Jul 2022 08:58            CAPILLARY BLOOD GLUCOSE                            CULTURES: (if applicable)    Culture - Blood (collected 07-11-22 @ 12:05)  Source: .Blood Blood  Preliminary Report (07-12-22 @ 17:02):    No growth to date.    Culture - Blood (collected 07-11-22 @ 11:58)  Source: .Blood Blood  Preliminary Report (07-12-22 @ 17:02):    No growth to date.        Culture - Urine (collected 07-10-22 @ 17:58)  Source: Clean Catch Clean Catch (Midstream)  Final Report (07-12-22 @ 16:00):    10,000 - 49,000 CFU/mL Candida glabrata "Susceptibilities not performed"                    Physical Examination:  PULM: decreased BS  CVS: S1, S2 heard      RADIOLOGY REVIEWED    CT chest: < from: CT Chest No Cont (07.12.22 @ 20:34) >  FINDINGS:    LYMPH NODES: No thoracic adenopathy    HEART/VASCULATURE: Heart size is normal. Small pericardial effusion.   Calcifications of the mitral annulus, aortic valve, aorta and coronary   arteries.    AIRWAYS/LUNGS/PLEURA: Central airways are patent. Expiratory motion   degraded study. Large pleural effusions. No pneumothorax.    UPPER ABDOMEN: Mild ascites. Small hiatal hernia. Partially visualized   left nephroureteral catheter.    BONES/SOFT TISSUES: Multilevel discogenic disease in the spine.   Osteopenia. Anasarca.    IMPRESSION:    Large pleural effusions. Small pericardial effusion. Mild ascites.   Anasarca.    < end of copied text >      TTE: < from: Transthoracic Echocardiogram (07.13.22 @ 11:17) >  Dimensions:    Normal Values:  LA:     2.8    2.0 - 4.0 cm  Ao:     3.0    2.0 - 3.8 cm  SEPTUM: 1.1    0.6 - 1.2 cm  PWT:    1.10.6 - 1.1 cm  LVIDd:  2.4    3.0 - 5.6 cm  LVIDs:         1.8 - 4.0 cm  Derived variables:  LVMI: 38 g/m2  RWT: 0.91  EF (Single Plane Ellipsoid): 73 %Doppler Peak Velocity  (m/sec): AoV=1.4  ------------------------------------------------------------------------  Observations:  Mitral Valve: Mitral annular calcification and calcified  mitral leaflets with decreased diastolic opening. Mild  mitral regurgitation.  Peak mitral valve gradient equals 11  mm Hg, mean transmitral valve gradient equals3 mm Hg,  consistent with mild mitral stenosis. Gradients are  measured at a HR of 93bpm.  Aortic Valve/Aorta: Aortic valve not well visualized;  appears calcified. Peak transaortic valve gradient equals 8  mm Hg. No aortic valve regurgitation seen. Peak left  ventricular outflow tract gradient equals 57 mm Hg, mean  gradient is equal to 4 mm Hg, LVOT velocity time integral  equals 27 cm, consistent with moderately severe LVOT  obstruction.  Aortic Root: 3 cm.  Left Atrium: Normal left atrium.  Left Ventricle: Hyperdynamic left ventricular systolic  function. Normal left ventricular internal dimensions and  wall thicknesses. Mild diastolic dysfunction (Stage I).  Right Heart: Normal right atrium. Normal right ventricular  size and function. Normal tricuspid valve. Mild tricuspid  regurgitation. Pulmonic valve not well visualized, probably  normal.  Pericardium/Pleura: Small pericardial effusion. The  effusion measures 0.7cm circumferentially.  No evidence of  right atrial or right ventricular collapse. No  echocardiographic evidence of pericardial tamponade.  Hemodynamic: Estimated right atrial pressure is 8 mm Hg.  Estimated right ventricular systolic pressure equals 33 mm  Hg, assuming right atrial pressure equals 8 mm Hg,  consistent with normal pulmonary pressures.  ------------------------------------------------------------------------  Conclusions:  1. Mitral annular calcification and calcified mitral  leaflets with decreased diastolic opening. Mild mitral  regurgitation.  Peak mitral valve gradient equals 11 mm Hg,  mean transmitral valve gradient equals 3 mm Hg, consistent  with mild mitral stenosis. Gradients are measured at a HR  of 93bpm.  2. Aortic valve not well visualized; appears calcified. No  aortic valve regurgitation seen.  3. Hyperdynamic left ventricular systolic function. Peak  left ventricular outflow tract gradient equals 57 mm Hg,  mean gradient is equal to 4 mm Hg, LVOT velocity time  integral equals 27 cm, consistent with moderately severe  LVOTobstruction.  4. Mild diastolic dysfunction (Stage I).  5. Normal right ventricular size and function.  6. Small pericardial effusion. The effusion measures 0.7cm  circumferentially.  No evidence of right atrial or right  ventricular collapse. No echocardiographic evidence of  pericardial tamponade.  *** No previous Echo exam.    < end of copied text >

## 2022-07-15 NOTE — PROGRESS NOTE ADULT - SUBJECTIVE AND OBJECTIVE BOX
[facility] [place] [facility]ACT[facility]   MD ZANE SERRATO MD, DO KELLY CARIAS NP    TEL:  OFFICE: [phone]    From 5pm-7am Answering Service [phone]    -- RENAL FOLLOW UP NOTE ---Date of Service 07-15-22 @ 13:41    Patient is a 81y old  Female who presents with a chief complaint of vomiting (15 Jul 2022 10:31)      Patient seen and examined at bedside.     VITALS:  T(F): 97.4 (07-15-22 @ 12:57), Max: 97.6 (07-14-22 @ 21:36)  HR: 88 (07-15-22 @ 12:57)  BP: 124/83 (07-15-22 @ 12:57)  RR: 16 (07-15-22 @ 12:57)  SpO2: 96% (07-15-22 @ 12:57)  Wt(kg): --    07-14 @ 07:01  -  07-15 @ 07:00  --------------------------------------------------------  IN: 415 mL / OUT: 1800 mL / NET: -1385 mL    07-15 @ 07:01  -  07-15 @ 13:41  --------------------------------------------------------  IN: 200 mL / OUT: 150 mL / NET: 50 mL          PHYSICAL EXAM:  Constitutional: NAD  Neck: No JVD  Respiratory: CTAB, no wheezes, rales or rhonchi  Cardiovascular: S1, S2, RRR  Gastrointestinal: BS+, soft, NT/ND  Extremities: + peripheral edema    Hospital Medications:   MEDICATIONS  (STANDING):  dextrose 5%. 1000 milliLiter(s) (50 mL/Hr) IV Continuous <Continuous>  dextrose 5%. 1000 milliLiter(s) (100 mL/Hr) IV Continuous <Continuous>  dextrose 50% Injectable 25 Gram(s) IV Push once  dextrose 50% Injectable 12.5 Gram(s) IV Push once  dextrose 50% Injectable 25 Gram(s) IV Push once  famotidine Injectable 20 milliGRAM(s) IV Push two times a day  glucagon  Injectable 1 milliGRAM(s) IntraMuscular once  heparin   Injectable 5000 Unit(s) SubCutaneous every 8 hours  metoclopramide 5 milliGRAM(s) Oral every 6 hours  nystatin Powder 1 Application(s) Topical two times a day  polyethylene glycol 3350 17 Gram(s) Oral daily  senna 2 Tablet(s) Oral at bedtime  sodium bicarbonate 650 milliGRAM(s) Oral three times a day  sucralfate 1 Gram(s) Oral four times a day  tamsulosin 0.4 milliGRAM(s) Oral daily      LABS:  07-15    137  |  101  |  37<H>  ----------------------------<  127<H>  4.5   |  19<L>  |  1.19    Ca    9.0      15 Jul 2022 12:10      Creatinine Trend: 1.19 <--, 0.67 <--, 0.68 <--, 0.64 <--, 0.69 <--, 0.62 <--, 0.58 <--, 0.55 <--, 0.51 <--, 0.48 <--, 0.49 <--, 0.51 <--            Urine Studies:  Urinalysis - [07-10-22 @ 17:58]      Color Yellow / Appearance Slightly Turbid / SG 1.022 / pH 6.0      Gluc Negative / Ketone Negative  / Bili Negative / Urobili Negative       Blood Large / Protein 30 mg/dL / Leuk Est Large / Nitrite Negative       /  / Hyaline 0 / Gran  / Sq Epi  / Non Sq Epi 1 / Bacteria Few    Urine Sodium 34      [07-10-22 @ 17:59]  Urine Osmolality 320      [07-10-22 @ 17:58]    TSH 6.11      [07-11-22 @ 12:19]        RADIOLOGY & ADDITIONAL STUDIES:

## 2022-07-15 NOTE — PROGRESS NOTE ADULT - ASSESSMENT
81 year old female with PMHx of HTN, chronic back and leg pain (wheelchair bound at baseline), who presented with an obstructing kidney stone and rectal distention from stool, had a ureteral stent placed and has now finished 10 day of antibiotics.   More recently, she has been borderline hypoxic, and there was suggestion of volume overload, based on her exam and CXR.    - appears volume overloaded on exam, with anasarca  - CXR from 7/11 with vida effusions and pulmonary congestion  - got IV Lasix, but sodium trended down and Tolvaptam.   - echocardiogram results noted-hyperdynamic lv with mod-sev lvot obstruction  - noting anasarca, albumin 1.2 and echo findings, would suggest that the edema relates to hypoalbuminemia and not an underlying cardiac process  - would not diurese aggressively, so as to avoid worsening lvot obstr  - would allow intravasc volume to increase a bit, and can consider the use of metoprolol 25 bid to avoid the hyperdynamic state and obstruction, if needed  -s he did not have a murmur at the time of my exam; the degree of obstruction will vary with bp and volume, and is probably not meaningful at this time  - cont to trend creatinine and sodium closely  - ekgs have documented paf, though most likely a sr/st with with apcs. She does remain borderline tachycardic per flow sheets, and if continues, would put her on telemetry monitoring.  - further evaluation will depend on clinical course  - will follow with you

## 2022-07-16 NOTE — PROGRESS NOTE ADULT - ASSESSMENT
81 year old female with PMHx of HTN, chronic back and leg pain (wheelchair bound at baseline) presents to ED c/o vomiting x 2 days. Found to have a 5mm L distal stone on CT scan. S/P left ureteral stent, has UTI getting abx, has been having nausea/vomiting GI on board, has persistent hyponatremia    A/P:  Hyponatremia: Resolved.  Etiology?  urine study is inconclusive   Na improved appropriately   hypervolemic - diuretics per cardiology   continue fluid restriction <1L a day   Na should not increase more than 6-8meq in 24hrs  monitor BMP closely     Acidosis:  High-AG  continue oral bicarb 650mg tid   monitor     Renal Insufficiency.   Scr increasing on 7/15.  Please get BMP in am.   Pt having poor po intake.

## 2022-07-16 NOTE — PROGRESS NOTE ADULT - SUBJECTIVE AND OBJECTIVE BOX
[facility]  Yannick Castillo PA-C  [address] [place] [address]  [phone]      INTERVAL HPI/OVERNIGHT EVENTS:  pt seen and examined, no new events      MEDICATIONS  (STANDING):  famotidine Injectable 20 milliGRAM(s) IV Push two times a day  heparin   Injectable 5000 Unit(s) SubCutaneous every 8 hours  metoclopramide 5 milliGRAM(s) Oral every 6 hours  polyethylene glycol 3350 17 Gram(s) Oral daily  senna 2 Tablet(s) Oral at bedtime  sodium bicarbonate  Infusion 0.23 mEq/kG/Hr (250 mL/Hr) IV Continuous <Continuous>  sucralfate 1 Gram(s) Oral four times a day  tamsulosin 0.4 milliGRAM(s) Oral daily    MEDICATIONS  (PRN):  acetaminophen     Tablet .. 650 milliGRAM(s) Oral every 6 hours PRN Temp greater or equal to 38C (100.4F), Mild Pain (1 - 3)  benzocaine 15 mG/menthol 3.6 mG Lozenge 1 Lozenge Oral three times a day PRN Sore Throat  guaiFENesin  milliGRAM(s) Oral every 12 hours PRN Cough  ondansetron Injectable 4 milliGRAM(s) IV Push every 6 hours PRN Nausea and/or Vomiting      Allergies    No Known Allergies    Intolerances        ROS:   unable to obtain       PHYSICAL EXAM:   Vital Signs Last 24 Hrs  T(C): 36.4 (2022 09:05), Max: 36.4 (2022 01:15)  T(F): 97.5 (2022 09:05), Max: 97.6 (2022 05:18)  HR: 84 (2022 09:05) (84 - 91)  BP: 126/83 (2022 09:05) (107/70 - 126/83)  BP(mean): --  RR: 18 (2022 09:05) (16 - 18)  SpO2: 97% (2022 09:05) (95% - 98%)    Parameters below as of 2022 09:05  Patient On (Oxygen Delivery Method): room air      Daily     Daily     GENERAL:  Appears stated age,   HEENT:  NC/AT,    CHEST:  Full & symmetric excursion,   HEART:  Regular rhythm,  ABDOMEN:  Soft, non-tender, non-distended,  EXTEREMITIES:  no cyanosis  SKIN:  No rash  NEURO:  Alert,       LABS:  07-15    137  |  101  |  37<H>  ----------------------------<  127<H>  4.5   |  19<L>  |  1.19    Ca    9.0      15 Jul 2022 12:10      Urinalysis Basic - ( 10 Jul 2022 17:58 )    Color: Yellow / Appearance: Slightly Turbid / S.022 / pH: x  Gluc: x / Ketone: Negative  / Bili: Negative / Urobili: Negative   Blood: x / Protein: 30 mg/dL / Nitrite: Negative   Leuk Esterase: Large / RBC: 241 /hpf /  /HPF   Sq Epi: x / Non Sq Epi: 1 /hpf / Bacteria: Few        RADIOLOGY & ADDITIONAL TESTS:

## 2022-07-16 NOTE — PROGRESS NOTE ADULT - SUBJECTIVE AND OBJECTIVE BOX
HPI:  81 year old female with PMHx of HTN, chronic back and leg pain (wheelchair bound at baseline) presents to ED c/o vomiting x 2 days. Today patient had wound care appointment for pressure wound to her R calf and vomited again so was brought in by aide. Found to have a 5mm L distal stone on CT scan. Pt denies history of stones, no flank pain, dysuria, urgency, frequency or gross hematuria.  (30 Jun 2022 17:15)        Patient in respiratory Distress Hypoxic     T(C): 36.5 (07-16-22 @ 21:49), Max: 36.5 (07-16-22 @ 21:49)  HR: 86 (07-16-22 @ 21:49) (66 - 86)  BP: 111/76 (07-16-22 @ 21:49) (111/76 - 126/81)  RR: 17 (07-16-22 @ 21:49) (17 - 18)  SpO2: 98% (07-16-22 @ 21:49) (97% - 98%)      MEDICATIONS  (STANDING):  dextrose 5%. 1000 milliLiter(s) (50 mL/Hr) IV Continuous <Continuous>  dextrose 5%. 1000 milliLiter(s) (100 mL/Hr) IV Continuous <Continuous>  dextrose 50% Injectable 25 Gram(s) IV Push once  dextrose 50% Injectable 12.5 Gram(s) IV Push once  dextrose 50% Injectable 25 Gram(s) IV Push once  famotidine Injectable 20 milliGRAM(s) IV Push two times a day  glucagon  Injectable 1 milliGRAM(s) IntraMuscular once  heparin   Injectable 5000 Unit(s) SubCutaneous every 8 hours  metoclopramide 5 milliGRAM(s) Oral every 6 hours  nystatin Powder 1 Application(s) Topical two times a day  polyethylene glycol 3350 17 Gram(s) Oral daily  senna 2 Tablet(s) Oral at bedtime  sodium bicarbonate 650 milliGRAM(s) Oral three times a day  sucralfate 1 Gram(s) Oral four times a day  tamsulosin 0.4 milliGRAM(s) Oral daily    MEDICATIONS  (PRN):  acetaminophen     Tablet .. 650 milliGRAM(s) Oral every 6 hours PRN Temp greater or equal to 38C (100.4F), Mild Pain (1 - 3)  benzocaine 15 mG/menthol 3.6 mG Lozenge 1 Lozenge Oral three times a day PRN Sore Throat  dextrose Oral Gel 15 Gram(s) Oral once PRN Blood Glucose LESS THAN 70 milliGRAM(s)/deciliter  guaiFENesin  milliGRAM(s) Oral every 12 hours PRN Cough  ondansetron Injectable 4 milliGRAM(s) IV Push every 6 hours PRN Nausea and/or Vomiting      Review of Systems:   CONSTITUTIONAL: No fever, weight loss, or fatigue  EYES: No eye pain, visual disturbances, or discharge  ENMT:  No difficulty hearing, tinnitus, vertigo; No sinus or throat pain  NECK: No pain or stiffness  BREASTS: No pain, masses, or nipple discharge  RESPIRATORY: No cough, wheezing, chills or hemoptysis; No shortness of breath  CARDIOVASCULAR: No chest pain, palpitations, dizziness, or leg swelling  GASTROINTESTINAL: No abdominal or epigastric pain. No nausea, vomiting, or hematemesis; No diarrhea or constipation. No melena or hematochezia.  GENITOURINARY: No dysuria, frequency, hematuria, or incontinence  NEUROLOGICAL: No headaches, memory loss, loss of strength, numbness, or tremors  SKIN: No itching, burning, rashes, or lesions   LYMPH NODES: No enlarged glands  ENDOCRINE: No heat or cold intolerance; No hair loss  MUSCULOSKELETAL: No joint pain or swelling; No muscle, back, or extremity pain  PSYCHIATRIC: No depression, anxiety, mood swings, or difficulty sleeping  HEME/LYMPH: No easy bruising, or bleeding gums  ALLERY AND IMMUNOLOGIC: No hives or eczema    Allergies    No Known Allergies    Intolerances  PHYSICAL EXAM:  GENERAL: NAD, well-developed  HEAD:  Atraumatic, Normocephalic  EYES: EOMI, PERRLA, conjunctiva and sclera clear  NECK: Supple, No JVD  CHEST/LUNG: Clear to auscultation bilaterally; No wheeze  HEART: Regular rate and rhythm; No murmurs, rubs, or gallops  ABDOMEN: Soft, Nontender, Nondistended; Bowel sounds present  EXTREMITIES:  2+ Peripheral Pulses, No clubbing, cyanosis, or edema  PSYCH: patient lethargic   NEUROLOGY: non-focal  SKIN: No rashes or lesions                            no new labs

## 2022-07-16 NOTE — PROGRESS NOTE ADULT - SUBJECTIVE AND OBJECTIVE BOX
[facility] [place] [facility]   MD ZANE SERRATO MD KRISTINE SOLTANPOUR, DO ANGELA WONG, PA    TEL:  OFFICE: [phone]  From 5pm-7am Answering Service [phone]    -- RENAL FOLLOW UP NOTE ---Date of Service 07-16-22 @ 15:13    Patient is a 81y old  Female who presents with a chief complaint of vomiting (16 Jul 2022 11:44)      Patient seen and examined at bedside. No chest pain/sob. Getting dressings changed by RN.    VITALS:  T(F): 97.5 (07-16-22 @ 14:18), Max: 97.6 (07-16-22 @ 05:18)  HR: 86 (07-16-22 @ 14:18)  BP: 126/81 (07-16-22 @ 14:18)  RR: 18 (07-16-22 @ 14:18)  SpO2: 98% (07-16-22 @ 14:18)  Wt(kg): --    07-15 @ 07:01  -  07-16 @ 07:00  --------------------------------------------------------  IN: 340 mL / OUT: 600 mL / NET: -260 mL    07-16 @ 07:01  -  07-16 @ 15:13  --------------------------------------------------------  IN: 80 mL / OUT: 0 mL / NET: 80 mL          PHYSICAL EXAM:  General: NAD  Neck: No JVD  Respiratory: CTAB, no wheezes, rales or rhonchi  Cardiovascular: S1, S2, RRR  Gastrointestinal: BS+, soft, NT/ND  Extremities: No peripheral edema    Hospital Medications:   MEDICATIONS  (STANDING):  dextrose 5%. 1000 milliLiter(s) (50 mL/Hr) IV Continuous <Continuous>  dextrose 5%. 1000 milliLiter(s) (100 mL/Hr) IV Continuous <Continuous>  dextrose 50% Injectable 25 Gram(s) IV Push once  dextrose 50% Injectable 12.5 Gram(s) IV Push once  dextrose 50% Injectable 25 Gram(s) IV Push once  famotidine Injectable 20 milliGRAM(s) IV Push two times a day  glucagon  Injectable 1 milliGRAM(s) IntraMuscular once  heparin   Injectable 5000 Unit(s) SubCutaneous every 8 hours  metoclopramide 5 milliGRAM(s) Oral every 6 hours  nystatin Powder 1 Application(s) Topical two times a day  polyethylene glycol 3350 17 Gram(s) Oral daily  senna 2 Tablet(s) Oral at bedtime  sodium bicarbonate 650 milliGRAM(s) Oral three times a day  sucralfate 1 Gram(s) Oral four times a day  tamsulosin 0.4 milliGRAM(s) Oral daily      LABS:  07-15    137  |  101  |  37<H>  ----------------------------<  127<H>  4.5   |  19<L>  |  1.19    Ca    9.0      15 Jul 2022 12:10      Creatinine Trend: 1.19 <--, 0.67 <--, 0.68 <--, 0.64 <--, 0.69 <--, 0.62 <--, 0.58 <--, 0.55 <--, 0.51 <--, 0.48 <--            Urine Studies:  Urinalysis - [07-10-22 @ 17:58]      Color Yellow / Appearance Slightly Turbid / SG 1.022 / pH 6.0      Gluc Negative / Ketone Negative  / Bili Negative / Urobili Negative       Blood Large / Protein 30 mg/dL / Leuk Est Large / Nitrite Negative       /  / Hyaline 0 / Gran  / Sq Epi  / Non Sq Epi 1 / Bacteria Few    Urine Sodium 34      [07-10-22 @ 17:59]  Urine Osmolality 320      [07-10-22 @ 17:58]    TSH 6.11      [07-11-22 @ 12:19]        RADIOLOGY & ADDITIONAL STUDIES:

## 2022-07-16 NOTE — PROGRESS NOTE ADULT - ASSESSMENT
81 year old female with PMHx of HTN, chronic back and leg pain (wheelchair bound at baseline), who presented with an obstructing kidney stone and rectal distention from stool, had a ureteral stent placed and has now finished 10 day of antibiotics.   More recently, she has been borderline hypoxic, and there was suggestion of volume overload, based on her exam and CXR.    - appears volume overloaded on exam, with anasarca  - CXR from 7/11 with vida effusions and pulmonary congestion  - got IV Lasix, but sodium trended down and Tolvaptam.   - echocardiogram results noted-hyperdynamic lv with mod-sev lvot obstruction  - noting anasarca, albumin 1.2 and echo findings, would suggest that the edema relates to hypoalbuminemia and not an underlying cardiac process  - would not diurese aggressively, so as to avoid worsening lvot obstr  - would allow intravascular volume to increase a bit  - add lopressor 12.5mg Q12  -s he did not have a murmur at the time of my exam; the degree of obstruction will vary with bp and volume, and is probably not meaningful at this time  - cont to trend creatinine and sodium closely  - ekgs have documented paf, though most likely a sr/st with with apcs. She does remain borderline tachycardic per flow sheets, and if continues, would put her on telemetry monitoring.  - further evaluation will depend on clinical course  - will follow with you

## 2022-07-16 NOTE — PROGRESS NOTE ADULT - SUBJECTIVE AND OBJECTIVE BOX
[facility] -- Anderson Hernandez, Kenny, Reddy, Juan Chan Savella  Office # [phone]      Follow Up:  HTN     Subjective/Observations: Patient seen and examined. Events noted. Resting comfortably in bed. No complaints of chest pain, dyspnea, or palpitations reported. No signs of orthopnea or PND.       REVIEW OF SYSTEMS: All other review of systems is negative unless indicated above    PAST MEDICAL & SURGICAL HISTORY:  Hypertension      Chronic back pain      Wheelchair bound      Left nephrolithiasis          MEDICATIONS  (STANDING):  dextrose 5%. 1000 milliLiter(s) (50 mL/Hr) IV Continuous <Continuous>  dextrose 5%. 1000 milliLiter(s) (100 mL/Hr) IV Continuous <Continuous>  dextrose 50% Injectable 25 Gram(s) IV Push once  dextrose 50% Injectable 12.5 Gram(s) IV Push once  dextrose 50% Injectable 25 Gram(s) IV Push once  famotidine Injectable 20 milliGRAM(s) IV Push two times a day  glucagon  Injectable 1 milliGRAM(s) IntraMuscular once  heparin   Injectable 5000 Unit(s) SubCutaneous every 8 hours  metoclopramide 5 milliGRAM(s) Oral every 6 hours  nystatin Powder 1 Application(s) Topical two times a day  polyethylene glycol 3350 17 Gram(s) Oral daily  senna 2 Tablet(s) Oral at bedtime  sodium bicarbonate 650 milliGRAM(s) Oral three times a day  sucralfate 1 Gram(s) Oral four times a day  tamsulosin 0.4 milliGRAM(s) Oral daily    MEDICATIONS  (PRN):  acetaminophen     Tablet .. 650 milliGRAM(s) Oral every 6 hours PRN Temp greater or equal to 38C (100.4F), Mild Pain (1 - 3)  benzocaine 15 mG/menthol 3.6 mG Lozenge 1 Lozenge Oral three times a day PRN Sore Throat  dextrose Oral Gel 15 Gram(s) Oral once PRN Blood Glucose LESS THAN 70 milliGRAM(s)/deciliter  guaiFENesin  milliGRAM(s) Oral every 12 hours PRN Cough  ondansetron Injectable 4 milliGRAM(s) IV Push every 6 hours PRN Nausea and/or Vomiting      Allergies    No Known Allergies    Intolerances            Vital Signs Last 24 Hrs  T(C): 36.4 (16 Jul 2022 09:05), Max: 36.4 (16 Jul 2022 01:15)  T(F): 97.5 (16 Jul 2022 09:05), Max: 97.6 (16 Jul 2022 05:18)  HR: 84 (16 Jul 2022 09:05) (84 - 91)  BP: 126/83 (16 Jul 2022 09:05) (107/70 - 126/83)  BP(mean): --  RR: 18 (16 Jul 2022 09:05) (16 - 18)  SpO2: 97% (16 Jul 2022 09:05) (95% - 98%)    Parameters below as of 16 Jul 2022 09:05  Patient On (Oxygen Delivery Method): room air        I&O's Summary    15 Jul 2022 07:01  -  16 Jul 2022 07:00  --------------------------------------------------------  IN: 340 mL / OUT: 600 mL / NET: -260 mL          PHYSICAL EXAM:     Constitutional: NAD, awake    HEENT: Moist Mucous Membranes, Anicteric  Pulmonary: Decreased breath sounds b/l. No rales, crackles or wheeze appreciated.   Cardiovascular: Regular, S1 and S2, No murmurs, rubs, gallops or clicks  Gastrointestinal: Bowel Sounds present, soft, nontender.   Lymph: + peripheral edema. No lymphadenopathy.  Skin: No visible rashes or ulcers.  Psych:  Mood & affect appropriate for situation    LABS: All Labs Reviewed:    15 Jul 2022 12:10    137    |  101    |  37     ----------------------------<  127    4.5     |  19     |  1.19   14 Jul 2022 08:58    131    |  103    |  8      ----------------------------<  57     3.5     |  20     |  0.67   13 Jul 2022 17:10    126    |  99     |  9      ----------------------------<  85     3.5     |  18     |  0.68     Ca    9.0        15 Jul 2022 12:10  Ca    8.4        14 Jul 2022 08:58  Ca    8.5        13 Jul 2022 17:10

## 2022-07-17 NOTE — PROGRESS NOTE ADULT - SUBJECTIVE AND OBJECTIVE BOX
[facility] -- Anderson Hernandez, Kenny, Reddy, Juan Chan Savella  Office # [phone]      Follow Up:  HTN     Subjective/Observations: Patient seen and examined. Events noted. Resting comfortably in bed. No complaints of chest pain, dyspnea, or palpitations reported. No signs of orthopnea or PND.       REVIEW OF SYSTEMS: All other review of systems is negative unless indicated above    PAST MEDICAL & SURGICAL HISTORY:  Hypertension      Chronic back pain      Wheelchair bound      Left nephrolithiasis          MEDICATIONS  (STANDING):  dextrose 5%. 1000 milliLiter(s) (50 mL/Hr) IV Continuous <Continuous>  dextrose 5%. 1000 milliLiter(s) (100 mL/Hr) IV Continuous <Continuous>  dextrose 50% Injectable 25 Gram(s) IV Push once  dextrose 50% Injectable 12.5 Gram(s) IV Push once  dextrose 50% Injectable 25 Gram(s) IV Push once  famotidine Injectable 20 milliGRAM(s) IV Push two times a day  glucagon  Injectable 1 milliGRAM(s) IntraMuscular once  heparin   Injectable 5000 Unit(s) SubCutaneous every 8 hours  metoclopramide 5 milliGRAM(s) Oral every 6 hours  nystatin Powder 1 Application(s) Topical two times a day  polyethylene glycol 3350 17 Gram(s) Oral daily  senna 2 Tablet(s) Oral at bedtime  sodium bicarbonate 650 milliGRAM(s) Oral three times a day  sucralfate 1 Gram(s) Oral four times a day  tamsulosin 0.4 milliGRAM(s) Oral daily    MEDICATIONS  (PRN):  acetaminophen     Tablet .. 650 milliGRAM(s) Oral every 6 hours PRN Temp greater or equal to 38C (100.4F), Mild Pain (1 - 3)  benzocaine 15 mG/menthol 3.6 mG Lozenge 1 Lozenge Oral three times a day PRN Sore Throat  dextrose Oral Gel 15 Gram(s) Oral once PRN Blood Glucose LESS THAN 70 milliGRAM(s)/deciliter  guaiFENesin  milliGRAM(s) Oral every 12 hours PRN Cough  ondansetron Injectable 4 milliGRAM(s) IV Push every 6 hours PRN Nausea and/or Vomiting      Allergies    No Known Allergies    Intolerances            Vital Signs Last 24 Hrs  T(C): 36.3 (17 Jul 2022 09:20), Max: 36.6 (17 Jul 2022 05:13)  T(F): 97.3 (17 Jul 2022 09:20), Max: 97.9 (17 Jul 2022 05:13)  HR: 95 (17 Jul 2022 09:20) (66 - 95)  BP: 133/82 (17 Jul 2022 09:20) (111/76 - 133/82)  BP(mean): --  RR: 17 (17 Jul 2022 09:20) (17 - 18)  SpO2: 98% (17 Jul 2022 09:20) (96% - 98%)    Parameters below as of 17 Jul 2022 09:20  Patient On (Oxygen Delivery Method): room air        I&O's Summary    16 Jul 2022 07:01  -  17 Jul 2022 07:00  --------------------------------------------------------  IN: 400 mL / OUT: 250 mL / NET: 150 mL          PHYSICAL EXAM:     Constitutional: NAD, awake and alert, well-developed  HEENT: Moist Mucous Membranes, Anicteric  Pulmonary: Decreased breath sounds b/l. No rales, crackles or wheeze appreciated.   Cardiovascular: Regular, S1 and S2, No murmurs, rubs, gallops or clicks  Gastrointestinal: Bowel Sounds present, soft, nontender.   Lymph: No peripheral edema. No lymphadenopathy.  Skin: No visible rashes or ulcers.  Psych:  Mood & affect appropriate for situation    LABS: All Labs Reviewed:    15 Jul 2022 12:10    137    |  101    |  37     ----------------------------<  127    4.5     |  19     |  1.19     Ca    9.0        15 Jul 2022 12:10

## 2022-07-17 NOTE — PROGRESS NOTE ADULT - SUBJECTIVE AND OBJECTIVE BOX
[facility] [facility]   MD ZANE SERRATO MD KRISTINE SOLTANPOUR, DO ANGELA WONG, PA    TEL:  OFFICE: [phone]  From 5pm-7am Answering Service [phone]    -- RENAL FOLLOW UP NOTE ---Date of Service 07-17-22 @ 20:20    Patient is a 81y old  Female who presents with a chief complaint of vomiting (17 Jul 2022 15:27)      Patient seen and examined at bedside. No chest pain/sob    VITALS:  T(F): 97.6 (07-17-22 @ 17:14), Max: 97.9 (07-17-22 @ 05:13)  HR: 93 (07-17-22 @ 17:14)  BP: 128/82 (07-17-22 @ 17:14)  RR: 16 (07-17-22 @ 17:14)  SpO2: 96% (07-17-22 @ 17:14)  Wt(kg): --    07-16 @ 07:01  -  07-17 @ 07:00  --------------------------------------------------------  IN: 400 mL / OUT: 250 mL / NET: 150 mL    07-17 @ 07:01  -  07-17 @ 20:20  --------------------------------------------------------  IN: 220 mL / OUT: 100 mL / NET: 120 mL          PHYSICAL EXAM:  General: NAD  Neck: No JVD  Respiratory: CTAB, no wheezes, rales or rhonchi  Cardiovascular: S1, S2, RRR  Gastrointestinal: BS+, soft, NT/ND  Extremities: No peripheral edema    Hospital Medications:   MEDICATIONS  (STANDING):  dextrose 5%. 1000 milliLiter(s) (50 mL/Hr) IV Continuous <Continuous>  dextrose 5%. 1000 milliLiter(s) (100 mL/Hr) IV Continuous <Continuous>  dextrose 50% Injectable 25 Gram(s) IV Push once  dextrose 50% Injectable 12.5 Gram(s) IV Push once  dextrose 50% Injectable 25 Gram(s) IV Push once  glucagon  Injectable 1 milliGRAM(s) IntraMuscular once  heparin   Injectable 5000 Unit(s) SubCutaneous every 8 hours  metoclopramide 5 milliGRAM(s) Oral every 6 hours  metoprolol tartrate 12.5 milliGRAM(s) Oral two times a day  nystatin Powder 1 Application(s) Topical two times a day  polyethylene glycol 3350 17 Gram(s) Oral daily  senna 2 Tablet(s) Oral at bedtime  sodium bicarbonate 650 milliGRAM(s) Oral three times a day  sucralfate 1 Gram(s) Oral four times a day  tamsulosin 0.4 milliGRAM(s) Oral daily      LABS:        Creatinine Trend: 1.19 <--, 0.67 <--, 0.68 <--, 0.64 <--, 0.69 <--, 0.62 <--, 0.58 <--            Urine Studies:  Urinalysis - [07-10-22 @ 17:58]      Color Yellow / Appearance Slightly Turbid / SG 1.022 / pH 6.0      Gluc Negative / Ketone Negative  / Bili Negative / Urobili Negative       Blood Large / Protein 30 mg/dL / Leuk Est Large / Nitrite Negative       /  / Hyaline 0 / Gran  / Sq Epi  / Non Sq Epi 1 / Bacteria Few      TSH 6.11      [07-11-22 @ 12:19]        RADIOLOGY & ADDITIONAL STUDIES:

## 2022-07-17 NOTE — PROGRESS NOTE ADULT - ASSESSMENT
81 year old female with PMHx of HTN, chronic back and leg pain (wheelchair bound at baseline), who presented with an obstructing kidney stone and rectal distention from stool, had a ureteral stent placed and has now finished 10 day of antibiotics.   More recently, she has been borderline hypoxic, and there was suggestion of volume overload, based on her exam and CXR.    - appears volume overloaded on exam, with anasarca  - CXR from 7/11 with vida effusions and pulmonary congestion  - got IV Lasix, but sodium trended down and Tolvaptam.   - echocardiogram results noted-hyperdynamic lv with mod-sev lvot obstruction  - noting anasarca, albumin 1.2 and echo findings, would suggest that the edema relates to hypoalbuminemia and not an underlying cardiac process  - would not diurese aggressively, so as to avoid worsening lvot obstr  - would allow intravascular volume to increase a bit  - Please add lopressor 12.5mg Q12 to maintain HR ~60s  -s he did not have a murmur at the time of my exam; the degree of obstruction will vary with bp and volume, and is probably not meaningful at this time  - cont to trend creatinine and sodium closely  - ekgs have documented paf, though most likely a sr/st with with apcs. She does remain borderline tachycardic per flow sheets, and if continues, would put her on telemetry monitoring.  - further evaluation will depend on clinical course  - will follow with you

## 2022-07-17 NOTE — PROGRESS NOTE ADULT - ASSESSMENT
81 year old female with PMHx of HTN, chronic back and leg pain (wheelchair bound at baseline) presents to ED c/o vomiting x 2 days. Found to have a 5mm L distal stone on CT scan. S/P left ureteral stent, has UTI getting abx, has been having nausea/vomiting GI on board, has persistent hyponatremia    A/P:  Hyponatremia: Resolved.  Etiology?  No new labs.   urine study is inconclusive   Na improved appropriately   hypervolemic - diuretics per cardiology   continue fluid restriction <1L a day   Na should not increase more than 6-8meq in 24hrs  monitor BMP closely     Acidosis:  High-AG  continue oral bicarb 650mg tid   monitor     Renal Insufficiency.   Scr increasing on 7/15.  Please get BMP in am.   Pt having poor po intake.

## 2022-07-17 NOTE — PROGRESS NOTE ADULT - SUBJECTIVE AND OBJECTIVE BOX
[facility]  Yannick Castillo PA-C  [address] [place] [address]  [phone]      INTERVAL HPI/OVERNIGHT EVENTS:  pt seen and examined, no new events      MEDICATIONS  (STANDING):  famotidine Injectable 20 milliGRAM(s) IV Push two times a day  heparin   Injectable 5000 Unit(s) SubCutaneous every 8 hours  metoclopramide 5 milliGRAM(s) Oral every 6 hours  polyethylene glycol 3350 17 Gram(s) Oral daily  senna 2 Tablet(s) Oral at bedtime  sodium bicarbonate  Infusion 0.23 mEq/kG/Hr (250 mL/Hr) IV Continuous <Continuous>  sucralfate 1 Gram(s) Oral four times a day  tamsulosin 0.4 milliGRAM(s) Oral daily    MEDICATIONS  (PRN):  acetaminophen     Tablet .. 650 milliGRAM(s) Oral every 6 hours PRN Temp greater or equal to 38C (100.4F), Mild Pain (1 - 3)  benzocaine 15 mG/menthol 3.6 mG Lozenge 1 Lozenge Oral three times a day PRN Sore Throat  guaiFENesin  milliGRAM(s) Oral every 12 hours PRN Cough  ondansetron Injectable 4 milliGRAM(s) IV Push every 6 hours PRN Nausea and/or Vomiting      Allergies    No Known Allergies    Intolerances        ROS:   unable to obtain       PHYSICAL EXAM:   Vital Signs Last 24 Hrs  T(C): 36.4 (2022 09:05), Max: 36.4 (2022 01:15)  T(F): 97.5 (2022 09:05), Max: 97.6 (2022 05:18)  HR: 84 (2022 09:05) (84 - 91)  BP: 126/83 (2022 09:05) (107/70 - 126/83)  BP(mean): --  RR: 18 (2022 09:05) (16 - 18)  SpO2: 97% (2022 09:05) (95% - 98%)    Parameters below as of 2022 09:05  Patient On (Oxygen Delivery Method): room air      Daily     Daily     GENERAL:  Appears stated age,   HEENT:  NC/AT,    CHEST:  Full & symmetric excursion,   HEART:  Regular rhythm,  ABDOMEN:  Soft, non-tender, non-distended,  EXTEREMITIES:  no cyanosis  SKIN:  No rash  NEURO:  Alert,       LABS:  07-15    137  |  101  |  37<H>  ----------------------------<  127<H>  4.5   |  19<L>  |  1.19    Ca    9.0      15 Jul 2022 12:10      Urinalysis Basic - ( 10 Jul 2022 17:58 )    Color: Yellow / Appearance: Slightly Turbid / S.022 / pH: x  Gluc: x / Ketone: Negative  / Bili: Negative / Urobili: Negative   Blood: x / Protein: 30 mg/dL / Nitrite: Negative   Leuk Esterase: Large / RBC: 241 /hpf /  /HPF   Sq Epi: x / Non Sq Epi: 1 /hpf / Bacteria: Few        RADIOLOGY & ADDITIONAL TESTS:

## 2022-07-17 NOTE — PROGRESS NOTE ADULT - SUBJECTIVE AND OBJECTIVE BOX
HPI:  81 year old female with PMHx of HTN, chronic back and leg pain (wheelchair bound at baseline) presents to ED c/o vomiting x 2 days. Today patient had wound care appointment for pressure wound to her R calf and vomited again so was brought in by aide. Found to have a 5mm L distal stone on CT scan. Pt denies history of stones, no flank pain, dysuria, urgency, frequency or gross hematuria.  (30 Jun 2022 17:15)        Patient in respiratory Distress Hypoxic     T(C): 36.4 (07-17-22 @ 17:14), Max: 36.4 (07-17-22 @ 17:14)  HR: 93 (07-17-22 @ 17:14) (90 - 95)  BP: 128/82 (07-17-22 @ 17:14) (125/62 - 133/82)  RR: 16 (07-17-22 @ 17:14) (16 - 17)  SpO2: 96% (07-17-22 @ 17:14) (95% - 98%)    MEDICATIONS  (STANDING):  dextrose 5%. 1000 milliLiter(s) (50 mL/Hr) IV Continuous <Continuous>  dextrose 5%. 1000 milliLiter(s) (100 mL/Hr) IV Continuous <Continuous>  dextrose 50% Injectable 25 Gram(s) IV Push once  dextrose 50% Injectable 12.5 Gram(s) IV Push once  dextrose 50% Injectable 25 Gram(s) IV Push once  glucagon  Injectable 1 milliGRAM(s) IntraMuscular once  heparin   Injectable 5000 Unit(s) SubCutaneous every 8 hours  metoclopramide 5 milliGRAM(s) Oral every 6 hours  metoprolol tartrate 12.5 milliGRAM(s) Oral two times a day  nystatin Powder 1 Application(s) Topical two times a day  polyethylene glycol 3350 17 Gram(s) Oral daily  senna 2 Tablet(s) Oral at bedtime  sodium bicarbonate 650 milliGRAM(s) Oral three times a day  sucralfate 1 Gram(s) Oral four times a day  tamsulosin 0.4 milliGRAM(s) Oral daily    MEDICATIONS  (PRN):  acetaminophen     Tablet .. 650 milliGRAM(s) Oral every 6 hours PRN Temp greater or equal to 38C (100.4F), Mild Pain (1 - 3)  benzocaine 15 mG/menthol 3.6 mG Lozenge 1 Lozenge Oral three times a day PRN Sore Throat  dextrose Oral Gel 15 Gram(s) Oral once PRN Blood Glucose LESS THAN 70 milliGRAM(s)/deciliter  guaiFENesin  milliGRAM(s) Oral every 12 hours PRN Cough  ondansetron Injectable 4 milliGRAM(s) IV Push every 6 hours PRN Nausea and/or Vomiting  Review of Systems:   CONSTITUTIONAL: No fever, weight loss, or fatigue  EYES: No eye pain, visual disturbances, or discharge  ENMT:  No difficulty hearing, tinnitus, vertigo; No sinus or throat pain  NECK: No pain or stiffness  BREASTS: No pain, masses, or nipple discharge  RESPIRATORY: No cough, wheezing, chills or hemoptysis; No shortness of breath  CARDIOVASCULAR: No chest pain, palpitations, dizziness, or leg swelling  GASTROINTESTINAL: No abdominal or epigastric pain. No nausea, vomiting, or hematemesis; No diarrhea or constipation. No melena or hematochezia.  GENITOURINARY: No dysuria, frequency, hematuria, or incontinence  NEUROLOGICAL: No headaches, memory loss, loss of strength, numbness, or tremors  SKIN: No itching, burning, rashes, or lesions   LYMPH NODES: No enlarged glands  ENDOCRINE: No heat or cold intolerance; No hair loss  MUSCULOSKELETAL: No joint pain or swelling; No muscle, back, or extremity pain  PSYCHIATRIC: No depression, anxiety, mood swings, or difficulty sleeping  HEME/LYMPH: No easy bruising, or bleeding gums  ALLERY AND IMMUNOLOGIC: No hives or eczema    Allergies    No Known Allergies    Intolerances  PHYSICAL EXAM:  GENERAL: NAD, well-developed  HEAD:  Atraumatic, Normocephalic  EYES: EOMI, PERRLA, conjunctiva and sclera clear  NECK: Supple, No JVD  CHEST/LUNG: Clear to auscultation bilaterally; No wheeze  HEART: Regular rate and rhythm; No murmurs, rubs, or gallops  ABDOMEN: Soft, Nontender, Nondistended; Bowel sounds present  EXTREMITIES:  2+ Peripheral Pulses, No clubbing, cyanosis, or edema  PSYCH: patient lethargic   NEUROLOGY: non-focal  SKIN: No rashes or lesions                            no new labs

## 2022-07-18 NOTE — PROGRESS NOTE ADULT - SUBJECTIVE AND OBJECTIVE BOX
[facility]  Yannick Castillo PA-C  [address] [place] [address]  [phone]      INTERVAL HPI/OVERNIGHT EVENTS:  pt seen and examined, no new events      MEDICATIONS  (STANDING):  famotidine Injectable 20 milliGRAM(s) IV Push two times a day  heparin   Injectable 5000 Unit(s) SubCutaneous every 8 hours  metoclopramide 5 milliGRAM(s) Oral every 6 hours  polyethylene glycol 3350 17 Gram(s) Oral daily  senna 2 Tablet(s) Oral at bedtime  sodium bicarbonate  Infusion 0.23 mEq/kG/Hr (250 mL/Hr) IV Continuous <Continuous>  sucralfate 1 Gram(s) Oral four times a day  tamsulosin 0.4 milliGRAM(s) Oral daily    MEDICATIONS  (PRN):  acetaminophen     Tablet .. 650 milliGRAM(s) Oral every 6 hours PRN Temp greater or equal to 38C (100.4F), Mild Pain (1 - 3)  benzocaine 15 mG/menthol 3.6 mG Lozenge 1 Lozenge Oral three times a day PRN Sore Throat  guaiFENesin  milliGRAM(s) Oral every 12 hours PRN Cough  ondansetron Injectable 4 milliGRAM(s) IV Push every 6 hours PRN Nausea and/or Vomiting      Allergies    No Known Allergies    Intolerances        ROS:   unable to obtain       PHYSICAL EXAM:   Vital Signs Last 24 Hrs  T(C): 36.3 (2022 05:09), Max: 36.5 (2022 22:14)  T(F): 97.4 (2022 05:09), Max: 97.7 (2022 22:14)  HR: 79 (2022 05:09) (75 - 93)  BP: 109/78 (2022 05:09) (109/78 - 128/82)  BP(mean): --  RR: 16 (2022 05:09) (16 - 16)  SpO2: 97% (2022 05:09) (95% - 97%)    Parameters below as of 2022 05:09  Patient On (Oxygen Delivery Method): room air      Daily     Daily     GENERAL:  Appears stated age,   HEENT:  NC/AT,    CHEST:  Full & symmetric excursion,   HEART:  Regular rhythm,  ABDOMEN:  Soft, non-tender, non-distended,  EXTEREMITIES:  no cyanosis  SKIN:  No rash  NEURO:  Alert,       LABS:      Urinalysis Basic - ( 10 Jul 2022 17:58 )    Color: Yellow / Appearance: Slightly Turbid / S.022 / pH: x  Gluc: x / Ketone: Negative  / Bili: Negative / Urobili: Negative   Blood: x / Protein: 30 mg/dL / Nitrite: Negative   Leuk Esterase: Large / RBC: 241 /hpf /  /HPF   Sq Epi: x / Non Sq Epi: 1 /hpf / Bacteria: Few        RADIOLOGY & ADDITIONAL TESTS:

## 2022-07-18 NOTE — DISCHARGE NOTE PROVIDER - DETAILS OF MALNUTRITION DIAGNOSIS/DIAGNOSES
This patient has been assessed with a concern for Malnutrition and was treated during this hospitalization for the following Nutrition diagnosis/diagnoses:     -  07/03/2022: Mild protein-calorie malnutrition

## 2022-07-18 NOTE — DISCHARGE NOTE PROVIDER - HOSPITAL COURSE
81 year old female with PMHx of HTN, chronic back and leg pain (wheelchair bound at baseline) presents to ED c/o vomiting x 2 days.   Found to have a 5mm L distal stone on CT scan, hospitalization c/b aspiration pneumonia, large pulmonary effusions and persistent hyponatremia.  Palliative called for advance care planning and goals of care      Problem/Recommendation - 1:  ·  Problem: Left nephrolithiasis.   ·  Recommendation: s/p stent  Cultures positive for enterococci s/p antibiotics.     Problem/Recommendation - 2:  ·  Problem: Pleural effusion.   ·  Recommendation: Pulmonary consult per primary team  s/p ECHO  f/u Results  ?Chf component.     Problem/Recommendation - 3:  ·  Problem: Pneumonia, aspiration.   ·  Recommendation: completed course of zosyn  Pleasure feeds  NO PEG.     Problem/Recommendation - 4:  ·  Problem: Hyponatremia.   ·  Recommendation: Na 125 persistently  Fluid restrictions  SIADH.     Problem/Recommendation - 5:  ·  Problem: ACP (advance care planning).   ·  Recommendation: See goals of care.

## 2022-07-18 NOTE — PROGRESS NOTE ADULT - SUBJECTIVE AND OBJECTIVE BOX
[facility]   MD ZANE SERRATO MD, DO KELLY CARIAS NP    TEL:  OFFICE: [phone]    From 5pm-7am Answering Service [phone]    -- RENAL FOLLOW UP NOTE ---Date of Service 07-18-22 @ 15:28    Patient is a 81y old  Female who presents with a chief complaint of vomiting (18 Jul 2022 10:41)      Patient seen and examined at bedside. No chest pain/sob    VITALS:  T(F): 97.3 (07-18-22 @ 13:26), Max: 97.7 (07-17-22 @ 22:14)  HR: 75 (07-18-22 @ 13:26)  BP: 115/71 (07-18-22 @ 13:26)  RR: 16 (07-18-22 @ 13:26)  SpO2: 97% (07-18-22 @ 13:26)  Wt(kg): --    07-17 @ 07:01  -  07-18 @ 07:00  --------------------------------------------------------  IN: 420 mL / OUT: 100 mL / NET: 320 mL    07-18 @ 07:01  -  07-18 @ 15:28  --------------------------------------------------------  IN: 110 mL / OUT: 200 mL / NET: -90 mL          PHYSICAL EXAM:  Constitutional: NAD  Neck: No JVD  Respiratory: CTAB, no wheezes, rales or rhonchi  Cardiovascular: S1, S2, RRR  Gastrointestinal: BS+, soft, NT/ND  Extremities: + peripheral edema    Hospital Medications:   MEDICATIONS  (STANDING):  dextrose 5%. 1000 milliLiter(s) (50 mL/Hr) IV Continuous <Continuous>  dextrose 5%. 1000 milliLiter(s) (100 mL/Hr) IV Continuous <Continuous>  dextrose 50% Injectable 25 Gram(s) IV Push once  dextrose 50% Injectable 12.5 Gram(s) IV Push once  dextrose 50% Injectable 25 Gram(s) IV Push once  glucagon  Injectable 1 milliGRAM(s) IntraMuscular once  heparin   Injectable 5000 Unit(s) SubCutaneous every 8 hours  metoclopramide 5 milliGRAM(s) Oral every 6 hours  metoprolol tartrate 12.5 milliGRAM(s) Oral two times a day  nystatin Powder 1 Application(s) Topical two times a day  polyethylene glycol 3350 17 Gram(s) Oral daily  senna 2 Tablet(s) Oral at bedtime  sodium bicarbonate 650 milliGRAM(s) Oral three times a day  sucralfate 1 Gram(s) Oral four times a day  tamsulosin 0.4 milliGRAM(s) Oral daily      LABS:        Creatinine Trend: 1.19 <--, 0.67 <--, 0.68 <--, 0.64 <--, 0.69 <--, 0.62 <--            Urine Studies:  Urinalysis - [07-10-22 @ 17:58]      Color Yellow / Appearance Slightly Turbid / SG 1.022 / pH 6.0      Gluc Negative / Ketone Negative  / Bili Negative / Urobili Negative       Blood Large / Protein 30 mg/dL / Leuk Est Large / Nitrite Negative       /  / Hyaline 0 / Gran  / Sq Epi  / Non Sq Epi 1 / Bacteria Few      TSH 6.11      [07-11-22 @ 12:19]        RADIOLOGY & ADDITIONAL STUDIES:

## 2022-07-18 NOTE — PROGRESS NOTE ADULT - SUBJECTIVE AND OBJECTIVE BOX
[facility] - Anderson Hernandez, Kenny, Reddy, Roico, Lonnie Martinez  Office Number:  [phone]    Patient resting comfortably in bed in NAD.  Laying flat with no respiratory distress.  No complaints of chest pain, dyspnea, palpitations, PND, or orthopnea.    F/U for:  LVOT obs         MEDICATIONS  (STANDING):  dextrose 5%. 1000 milliLiter(s) (50 mL/Hr) IV Continuous <Continuous>  dextrose 5%. 1000 milliLiter(s) (100 mL/Hr) IV Continuous <Continuous>  dextrose 50% Injectable 25 Gram(s) IV Push once  dextrose 50% Injectable 12.5 Gram(s) IV Push once  dextrose 50% Injectable 25 Gram(s) IV Push once  glucagon  Injectable 1 milliGRAM(s) IntraMuscular once  heparin   Injectable 5000 Unit(s) SubCutaneous every 8 hours  metoclopramide 5 milliGRAM(s) Oral every 6 hours  metoprolol tartrate 12.5 milliGRAM(s) Oral two times a day  nystatin Powder 1 Application(s) Topical two times a day  polyethylene glycol 3350 17 Gram(s) Oral daily  senna 2 Tablet(s) Oral at bedtime  sodium bicarbonate 650 milliGRAM(s) Oral three times a day  sucralfate 1 Gram(s) Oral four times a day  tamsulosin 0.4 milliGRAM(s) Oral daily    MEDICATIONS  (PRN):  acetaminophen     Tablet .. 650 milliGRAM(s) Oral every 6 hours PRN Temp greater or equal to 38C (100.4F), Mild Pain (1 - 3)  benzocaine 15 mG/menthol 3.6 mG Lozenge 1 Lozenge Oral three times a day PRN Sore Throat  dextrose Oral Gel 15 Gram(s) Oral once PRN Blood Glucose LESS THAN 70 milliGRAM(s)/deciliter  guaiFENesin  milliGRAM(s) Oral every 12 hours PRN Cough  ondansetron Injectable 4 milliGRAM(s) IV Push every 6 hours PRN Nausea and/or Vomiting      Allergies    No Known Allergies    Intolerances        Vital Signs Last 24 Hrs  T(C): 36.3 (18 Jul 2022 05:09), Max: 36.5 (17 Jul 2022 22:14)  T(F): 97.4 (18 Jul 2022 05:09), Max: 97.7 (17 Jul 2022 22:14)  HR: 79 (18 Jul 2022 05:09) (75 - 95)  BP: 109/78 (18 Jul 2022 05:09) (109/78 - 133/82)  BP(mean): --  RR: 16 (18 Jul 2022 05:09) (16 - 17)  SpO2: 97% (18 Jul 2022 05:09) (95% - 98%)    Parameters below as of 18 Jul 2022 05:09  Patient On (Oxygen Delivery Method): room air        I&O's Summary    17 Jul 2022 07:01  -  18 Jul 2022 07:00  --------------------------------------------------------  IN: 420 mL / OUT: 100 mL / NET: 320 mL        ON EXAM:    General: NAD, awake and alert, oriented x 3  HEENT: Mucous membranes are moist, anicteric  Lungs: Non-labored, breath sounds are clear bilaterally, No wheezing, rales or rhonchi.  Dc breath sounds b/l  Cardiovascular: Regular, S1 and S2, no murmurs, rubs, or gallops  Gastrointestinal: Bowel Sounds present, soft, nontender.   Lymph: No peripheral edema. No lymphadenopathy.  Skin: No rashes or ulcers  Psych:  Mood & affect appropriate    LABS: All Labs Reviewed:    15 Jul 2022 12:10    137    |  101    |  37     ----------------------------<  127    4.5     |  19     |  1.19     Ca    9.0        15 Jul 2022 12:10

## 2022-07-18 NOTE — PROGRESS NOTE ADULT - SUBJECTIVE AND OBJECTIVE BOX
HPI:  81 year old female with PMHx of HTN, chronic back and leg pain (wheelchair bound at baseline) presents to ED c/o vomiting x 2 days. Today patient had wound care appointment for pressure wound to her R calf and vomited again so was brought in by aide. Found to have a 5mm L distal stone on CT scan. Pt denies history of stones, no flank pain, dysuria, urgency, frequency or gross hematuria.  (30 Jun 2022 17:15)      No events   T(C): 36.4 (07-18-22 @ 17:10), Max: 36.4 (07-18-22 @ 17:10)  HR: 80 (07-18-22 @ 17:10) (75 - 80)  BP: 94/70 (07-18-22 @ 17:10) (94/70 - 115/71)  RR: 17 (07-18-22 @ 17:10) (16 - 17)  SpO2: 97% (07-18-22 @ 17:10) (97% - 97%)        MEDICATIONS  (STANDING):  dextrose 5%. 1000 milliLiter(s) (50 mL/Hr) IV Continuous <Continuous>  dextrose 5%. 1000 milliLiter(s) (100 mL/Hr) IV Continuous <Continuous>  dextrose 50% Injectable 25 Gram(s) IV Push once  dextrose 50% Injectable 12.5 Gram(s) IV Push once  dextrose 50% Injectable 25 Gram(s) IV Push once  glucagon  Injectable 1 milliGRAM(s) IntraMuscular once  heparin   Injectable 5000 Unit(s) SubCutaneous every 8 hours  metoclopramide 5 milliGRAM(s) Oral every 6 hours  metoprolol tartrate 12.5 milliGRAM(s) Oral two times a day  nystatin Powder 1 Application(s) Topical two times a day  polyethylene glycol 3350 17 Gram(s) Oral daily  senna 2 Tablet(s) Oral at bedtime  sodium bicarbonate 650 milliGRAM(s) Oral three times a day  sucralfate 1 Gram(s) Oral four times a day  tamsulosin 0.4 milliGRAM(s) Oral daily    MEDICATIONS  (PRN):  acetaminophen     Tablet .. 650 milliGRAM(s) Oral every 6 hours PRN Temp greater or equal to 38C (100.4F), Mild Pain (1 - 3)  benzocaine 15 mG/menthol 3.6 mG Lozenge 1 Lozenge Oral three times a day PRN Sore Throat  dextrose Oral Gel 15 Gram(s) Oral once PRN Blood Glucose LESS THAN 70 milliGRAM(s)/deciliter  guaiFENesin  milliGRAM(s) Oral every 12 hours PRN Cough  ondansetron Injectable 4 milliGRAM(s) IV Push every 6 hours PRN Nausea and/or Vomiting  Allergies    No Known Allergies    Intolerances  PHYSICAL EXAM:  GENERAL: NAD, well-developed  HEAD:  Atraumatic, Normocephalic  EYES: EOMI, PERRLA, conjunctiva and sclera clear  NECK: Supple, No JVD  CHEST/LUNG: Clear to auscultation bilaterally; No wheeze  HEART: Regular rate and rhythm; No murmurs, rubs, or gallops  ABDOMEN: Soft, Nontender, Nondistended; Bowel sounds present  EXTREMITIES:  2+ Peripheral Pulses, No clubbing, cyanosis, or edema  PSYCH: patient lethargic   SKIN: No rashes or lesions                            no new labs

## 2022-07-18 NOTE — DISCHARGE NOTE PROVIDER - NSDCCPCAREPLAN_GEN_ALL_CORE_FT
PRINCIPAL DISCHARGE DIAGNOSIS  Diagnosis: UTI (urinary tract infection)  Assessment and Plan of Treatment: HOME CARE INSTRUCTIONS  Drink enough water and fluids to keep your urine clear or pale yellow.  Avoid caffeine, tea, and carbonated beverages. They tend to irritate your bladder.  Empty your bladder often. Avoid holding urine for long periods of time.  Empty your bladder before and after sexual intercourse.  After a bowel movement, women should cleanse from front to back. Use each tissue only once.  SEEK MEDICAL CARE IF:  You have back pain.  You develop a fever.  Your symptoms do not begin to resolve within 3 days.  SEEK IMMEDIATE MEDICAL CARE IF:  You have severe back pain or lower abdominal pain.  You develop chills.  You have nausea or vomiting.  You have continued burning or discomfort with urination.      SECONDARY DISCHARGE DIAGNOSES  Diagnosis: Left nephrolithiasis  Assessment and Plan of Treatment: 5mm L distal stone on CT scan - status post cysto and L ureteral stent  -Urine culture with klebsiella and enterococcus. S/p course of abx per ID.    Diagnosis: Pleural effusion  Assessment and Plan of Treatment: Likely secondary to CHF.    Diagnosis: Pneumonia  Assessment and Plan of Treatment: Pneumonia is a lung infection that can cause a fever, cough, and trouble breathing.  Continue all antibiotics as ordered until complete.  Nutrition is important, eat small frequent meals.  Get lots of rest and drink fluids.  Call your health care provider upon arrival home from hospital and make a follow up appointment for one week.  If your cough worsens, you develop fever greater than 101', you have shaking chills, a fast heartbeat, trouble breathing and/or feel your are breathing much faster than usual, call your healthcare provider.  Make sure you wash your hands frequently.      Diagnosis: Dysphagia  Assessment and Plan of Treatment: Continue current diet    Diagnosis: Hyponatremia  Assessment and Plan of Treatment: Likely SIADH.Continue fluid restriction if within goals of care    Diagnosis: Chronic CHF  Assessment and Plan of Treatment: Weigh yourself daily.  If you gain 3lbs in 3 days, or 5lbs in a week call your Health Care Provider.  Do not eat or drink foods containing more than 2000mg of salt (sodium) in your diet every day.  Call your Health Care Provider if you have any swelling or increased swelling in your feet, ankles, and/or stomach.  Take all of your medication as directed.  If you become dizzy call your Health Care Provider.

## 2022-07-18 NOTE — PROGRESS NOTE ADULT - SUBJECTIVE AND OBJECTIVE BOX
Follow-up Pulm Progress Note    No new respiratory events overnight.  Denies SOB/CP.     Medications:  MEDICATIONS  (STANDING):  dextrose 5%. 1000 milliLiter(s) (50 mL/Hr) IV Continuous <Continuous>  dextrose 5%. 1000 milliLiter(s) (100 mL/Hr) IV Continuous <Continuous>  dextrose 50% Injectable 25 Gram(s) IV Push once  dextrose 50% Injectable 12.5 Gram(s) IV Push once  dextrose 50% Injectable 25 Gram(s) IV Push once  glucagon  Injectable 1 milliGRAM(s) IntraMuscular once  heparin   Injectable 5000 Unit(s) SubCutaneous every 8 hours  metoclopramide 5 milliGRAM(s) Oral every 6 hours  metoprolol tartrate 12.5 milliGRAM(s) Oral two times a day  nystatin Powder 1 Application(s) Topical two times a day  polyethylene glycol 3350 17 Gram(s) Oral daily  senna 2 Tablet(s) Oral at bedtime  sodium bicarbonate 650 milliGRAM(s) Oral three times a day  sucralfate 1 Gram(s) Oral four times a day  tamsulosin 0.4 milliGRAM(s) Oral daily    MEDICATIONS  (PRN):  acetaminophen     Tablet .. 650 milliGRAM(s) Oral every 6 hours PRN Temp greater or equal to 38C (100.4F), Mild Pain (1 - 3)  benzocaine 15 mG/menthol 3.6 mG Lozenge 1 Lozenge Oral three times a day PRN Sore Throat  dextrose Oral Gel 15 Gram(s) Oral once PRN Blood Glucose LESS THAN 70 milliGRAM(s)/deciliter  guaiFENesin  milliGRAM(s) Oral every 12 hours PRN Cough  ondansetron Injectable 4 milliGRAM(s) IV Push every 6 hours PRN Nausea and/or Vomiting          Vital Signs Last 24 Hrs  T(C): 36.3 (18 Jul 2022 05:09), Max: 36.5 (17 Jul 2022 22:14)  T(F): 97.4 (18 Jul 2022 05:09), Max: 97.7 (17 Jul 2022 22:14)  HR: 79 (18 Jul 2022 05:09) (75 - 95)  BP: 109/78 (18 Jul 2022 05:09) (109/78 - 133/82)  BP(mean): --  RR: 16 (18 Jul 2022 05:09) (16 - 17)  SpO2: 97% (18 Jul 2022 05:09) (95% - 98%)    Parameters below as of 18 Jul 2022 05:09  Patient On (Oxygen Delivery Method): room air              07-17 @ 07:01  -  07-18 @ 07:00  --------------------------------------------------------  IN: 420 mL / OUT: 100 mL / NET: 320 mL          CULTURES:    Culture - Blood (collected 07-11-22 @ 12:05)  Source: .Blood Blood  Final Report (07-16-22 @ 17:00):    No Growth Final    Culture - Blood (collected 07-11-22 @ 11:58)  Source: .Blood Blood  Final Report (07-16-22 @ 17:00):    No Growth Final        Culture - Urine (collected 07-10-22 @ 17:58)  Source: Clean Catch Clean Catch (Midstream)  Final Report (07-12-22 @ 16:00):    10,000 - 49,000 CFU/mL Candida glabrata "Susceptibilities not performed"                Physical Examination:  PULM: decreased BS  CVS: S1, S2 heard      RADIOLOGY REVIEWED    CT chest: < from: CT Chest No Cont (07.12.22 @ 20:34) >  FINDINGS:    LYMPH NODES: No thoracic adenopathy    HEART/VASCULATURE: Heart size is normal. Small pericardial effusion.   Calcifications of the mitral annulus, aortic valve, aorta and coronary   arteries.    AIRWAYS/LUNGS/PLEURA: Central airways are patent. Expiratory motion   degraded study. Large pleural effusions. No pneumothorax.    UPPER ABDOMEN: Mild ascites. Small hiatal hernia. Partially visualized   left nephroureteral catheter.    BONES/SOFT TISSUES: Multilevel discogenic disease in the spine.   Osteopenia. Anasarca.    IMPRESSION:    Large pleural effusions. Small pericardial effusion. Mild ascites.   Anasarca.    < end of copied text >      TTE: < from: Transthoracic Echocardiogram (07.13.22 @ 11:17) >  Dimensions:    Normal Values:  LA:     2.8    2.0 - 4.0 cm  Ao:     3.0    2.0 - 3.8 cm  SEPTUM: 1.1    0.6 - 1.2 cm  PWT:    1.10.6 - 1.1 cm  LVIDd:  2.4    3.0 - 5.6 cm  LVIDs:         1.8 - 4.0 cm  Derived variables:  LVMI: 38 g/m2  RWT: 0.91  EF (Single Plane Ellipsoid): 73 %Doppler Peak Velocity  (m/sec): AoV=1.4  ------------------------------------------------------------------------  Observations:  Mitral Valve: Mitral annular calcification and calcified  mitral leaflets with decreased diastolic opening. Mild  mitral regurgitation.  Peak mitral valve gradient equals 11  mm Hg, mean transmitral valve gradient equals3 mm Hg,  consistent with mild mitral stenosis. Gradients are  measured at a HR of 93bpm.  Aortic Valve/Aorta: Aortic valve not well visualized;  appears calcified. Peak transaortic valve gradient equals 8  mm Hg. No aortic valve regurgitation seen. Peak left  ventricular outflow tract gradient equals 57 mm Hg, mean  gradient is equal to 4 mm Hg, LVOT velocity time integral  equals 27 cm, consistent with moderately severe LVOT  obstruction.  Aortic Root: 3 cm.  Left Atrium: Normal left atrium.  Left Ventricle: Hyperdynamic left ventricular systolic  function. Normal left ventricular internal dimensions and  wall thicknesses. Mild diastolic dysfunction (Stage I).  Right Heart: Normal right atrium. Normal right ventricular  size and function. Normal tricuspid valve. Mild tricuspid  regurgitation. Pulmonic valve not well visualized, probably  normal.  Pericardium/Pleura: Small pericardial effusion. The  effusion measures 0.7cm circumferentially.  No evidence of  right atrial or right ventricular collapse. No  echocardiographic evidence of pericardial tamponade.  Hemodynamic: Estimated right atrial pressure is 8 mm Hg.  Estimated right ventricular systolic pressure equals 33 mm  Hg, assuming right atrial pressure equals 8 mm Hg,  consistent with normal pulmonary pressures.  ------------------------------------------------------------------------  Conclusions:  1. Mitral annular calcification and calcified mitral  leaflets with decreased diastolic opening. Mild mitral  regurgitation.  Peak mitral valve gradient equals 11 mm Hg,  mean transmitral valve gradient equals 3 mm Hg, consistent  with mild mitral stenosis. Gradients are measured at a HR  of 93bpm.  2. Aortic valve not well visualized; appears calcified. No  aortic valve regurgitation seen.  3. Hyperdynamic left ventricular systolic function. Peak  left ventricular outflow tract gradient equals 57 mm Hg,  mean gradient is equal to 4 mm Hg, LVOT velocity time  integral equals 27 cm, consistent with moderately severe  LVOTobstruction.  4. Mild diastolic dysfunction (Stage I).  5. Normal right ventricular size and function.  6. Small pericardial effusion. The effusion measures 0.7cm  circumferentially.  No evidence of right atrial or right  ventricular collapse. No echocardiographic evidence of  pericardial tamponade.  *** No previous Echo exam.    < end of copied text >

## 2022-07-18 NOTE — PROGRESS NOTE ADULT - ASSESSMENT
81 year old female with PMHx of HTN, chronic back and leg pain (wheelchair bound at baseline), who presented with an obstructing kidney stone and rectal distention from stool, had a ureteral stent placed and has now finished 10 day of antibiotics.   More recently, she has been borderline hypoxic, and there was suggestion of volume overload, based on her exam and CXR.    - appears volume overloaded on exam, with anasarca  - CXR from 7/11 with vida effusions and pulmonary congestion  - got IV Lasix, but sodium trended down and Tolvaptam.   - echocardiogram results noted-hyperdynamic lv with mod-sev lvot obstruction  - noting anasarca, albumin 1.2 and echo findings, would suggest that the edema relates to hypoalbuminemia and not an underlying cardiac process  - would not diurese aggressively, so as to avoid worsening lvot obstr  - would allow intravascular volume to increase a bit  - Metoprolol 12.5 bid added.  Increase to 25 bid if able  -s he did not have a murmur at the time of my exam; the degree of obstruction will vary with bp and volume, and is probably not meaningful at this time  - cont to trend creatinine and sodium closely  - ekgs have documented paf, though most likely a sr/st with with apcs. She does remain borderline tachycardic per flow sheets, and if continues, would put her on telemetry monitoring.  - further evaluation will depend on clinical course  - will follow with you

## 2022-07-19 NOTE — CHART NOTE - NSCHARTNOTEFT_GEN_A_CORE
Death Note: Called to see the patient for unresponsiveness.     On exam:  Physical exam showed patient in bed, unresponsive to verbal or noxious stimuli.  Pupils are fixed and dilated.  No spontaneous respirations.  Skin pale. Absent heart and breath sounds.  No palpable carotid and radial pulses.    Absent peripheral pulses.  Patient pronounced dead at 1920.   Dr Sims  notified .   Sister, Chela, notified  Autopsy declined.      Lior Vela PA-C    Dept of Medicine Death Note: Called to see the patient for unresponsiveness.     On exam:  Physical exam showed patient in bed, unresponsive to verbal or noxious stimuli.  Pupils are fixed and dilated.  No spontaneous respirations.  Skin pale. Absent heart and breath sounds.  No palpable carotid and radial pulses.    Absent peripheral pulses.  Patient pronounced dead at 1920.   Dr Sims  notified .   Sister, Chela, notified  Autopsy declined.      Lior Vela PA-C    Dept of Medicine  #[id number]

## 2022-07-19 NOTE — PROGRESS NOTE ADULT - SUBJECTIVE AND OBJECTIVE BOX
[facility] - Anderson Hernandez, Kenny, Reddy, Rocio, Lonnie Martinez  Office Number:  [phone]    Patient resting comfortably in bed in NAD.  Laying flat with no respiratory distress.  Awaiting d/c to hospice.    F/U for:  LVOT obs    MEDICATIONS  (STANDING):  dextrose 5%. 1000 milliLiter(s) (100 mL/Hr) IV Continuous <Continuous>  dextrose 5%. 1000 milliLiter(s) (50 mL/Hr) IV Continuous <Continuous>  dextrose 50% Injectable 25 Gram(s) IV Push once  dextrose 50% Injectable 12.5 Gram(s) IV Push once  dextrose 50% Injectable 25 Gram(s) IV Push once  glucagon  Injectable 1 milliGRAM(s) IntraMuscular once  heparin   Injectable 5000 Unit(s) SubCutaneous every 8 hours  metoclopramide 5 milliGRAM(s) Oral every 6 hours  metoprolol tartrate 12.5 milliGRAM(s) Oral two times a day  nystatin Powder 1 Application(s) Topical two times a day  polyethylene glycol 3350 17 Gram(s) Oral daily  senna 2 Tablet(s) Oral at bedtime  sodium bicarbonate 650 milliGRAM(s) Oral three times a day  sucralfate 1 Gram(s) Oral four times a day  tamsulosin 0.4 milliGRAM(s) Oral daily    MEDICATIONS  (PRN):  acetaminophen     Tablet .. 650 milliGRAM(s) Oral every 6 hours PRN Temp greater or equal to 38C (100.4F), Mild Pain (1 - 3)  benzocaine 15 mG/menthol 3.6 mG Lozenge 1 Lozenge Oral three times a day PRN Sore Throat  dextrose Oral Gel 15 Gram(s) Oral once PRN Blood Glucose LESS THAN 70 milliGRAM(s)/deciliter  guaiFENesin  milliGRAM(s) Oral every 12 hours PRN Cough  ondansetron Injectable 4 milliGRAM(s) IV Push every 6 hours PRN Nausea and/or Vomiting      Allergies    No Known Allergies    Intolerances        Vital Signs Last 24 Hrs  T(C): 36.3 (19 Jul 2022 05:39), Max: 36.4 (18 Jul 2022 17:10)  T(F): 97.3 (19 Jul 2022 05:39), Max: 97.5 (18 Jul 2022 17:10)  HR: 82 (19 Jul 2022 05:39) (75 - 82)  BP: 96/56 (19 Jul 2022 05:39) (94/70 - 121/78)  BP(mean): --  RR: 18 (19 Jul 2022 05:39) (16 - 18)  SpO2: 95% (19 Jul 2022 05:39) (95% - 97%)    Parameters below as of 19 Jul 2022 05:39  Patient On (Oxygen Delivery Method): room air        I&O's Summary    18 Jul 2022 07:01  -  19 Jul 2022 07:00  --------------------------------------------------------  IN: 520 mL / OUT: 300 mL / NET: 220 mL        ON EXAM:    General: NAD, awake and alert, oriented x 3  HEENT: Mucous membranes are moist, anicteric  Lungs: Non-labored, breath sounds are clear bilaterally, No wheezing, rales or rhonchi.  Dc breath sounds b/l  Cardiovascular: Regular, S1 and S2, no murmurs, rubs, or gallops  Gastrointestinal: Bowel Sounds present, soft, nontender.   Lymph: No peripheral edema. No lymphadenopathy.  Skin: No rashes or ulcers  Psych:  Mood & affect appropriate    LABS: All Labs Reviewed:

## 2022-07-19 NOTE — PROGRESS NOTE ADULT - REASON FOR ADMISSION
vomiting

## 2022-07-19 NOTE — PROGRESS NOTE ADULT - PROVIDER SPECIALTY LIST ADULT
Cardiology
Cardiology
Gastroenterology
Hospitalist
Hospitalist
Infectious Disease
Nephrology
Urology
Wound Care
Wound Care
Cardiology
Cardiology
Gastroenterology
Hospitalist
Infectious Disease
Infectious Disease
Internal Medicine
Nephrology
Nephrology
Gastroenterology
Hospitalist
Hospitalist
Infectious Disease
Urology
Urology
Cardiology
Gastroenterology
Hospitalist
Hospitalist
Infectious Disease
Nephrology
Pulmonology
Urology
Hospitalist
Pulmonology

## 2022-07-19 NOTE — PROGRESS NOTE ADULT - NUTRITIONAL ASSESSMENT
This patient has been assessed with a concern for Malnutrition and has been determined to have a diagnosis/diagnoses of Mild protein-calorie malnutrition.    This patient is being managed with:   Diet Minced and Moist-  1000mL Fluid Restriction ([id number])  Entered: Jul 14 2022  2:33PM    
This patient has been assessed with a concern for Malnutrition and has been determined to have a diagnosis/diagnoses of Mild protein-calorie malnutrition.    This patient is being managed with:   Diet Minced and Moist-  1000mL Fluid Restriction ([id number])  Entered: Jul 14 2022  2:33PM    
This patient has been assessed with a concern for Malnutrition and has been determined to have a diagnosis/diagnoses of Mild protein-calorie malnutrition.    This patient is being managed with:   Diet Minced and Moist-  Entered: Jul 4 2022  3:16PM    
This patient has been assessed with a concern for Malnutrition and has been determined to have a diagnosis/diagnoses of Mild protein-calorie malnutrition.    This patient is being managed with:   Diet Minced and Moist-  1000mL Fluid Restriction ([id number])  Entered: Jul 14 2022  2:33PM    
This patient has been assessed with a concern for Malnutrition and has been determined to have a diagnosis/diagnoses of Mild protein-calorie malnutrition.    This patient is being managed with:   Diet Minced and Moist-  1200mL Fluid Restriction ([id number])  Entered: Jul 9 2022  9:30PM    
This patient has been assessed with a concern for Malnutrition and has been determined to have a diagnosis/diagnoses of Mild protein-calorie malnutrition.    This patient is being managed with:   Diet Minced and Moist-  Entered: Jul 12 2022  2:01PM    
This patient has been assessed with a concern for Malnutrition and has been determined to have a diagnosis/diagnoses of Mild protein-calorie malnutrition.    This patient is being managed with:   Diet Minced and Moist-  Entered: Jul 4 2022  3:16PM    
This patient has been assessed with a concern for Malnutrition and has been determined to have a diagnosis/diagnoses of Mild protein-calorie malnutrition.    This patient is being managed with:   Diet Minced and Moist-  1000mL Fluid Restriction ([id number])  Entered: Jul 14 2022  2:33PM    
This patient has been assessed with a concern for Malnutrition and has been determined to have a diagnosis/diagnoses of Mild protein-calorie malnutrition.    This patient is being managed with:   Diet Minced and Moist-  1200mL Fluid Restriction ([id number])  Entered: Jul 9 2022  9:30PM    
This patient has been assessed with a concern for Malnutrition and has been determined to have a diagnosis/diagnoses of Mild protein-calorie malnutrition.    This patient is being managed with:   Diet Minced and Moist-  1200mL Fluid Restriction ([id number])  Entered: Jul 9 2022  9:30PM    
This patient has been assessed with a concern for Malnutrition and has been determined to have a diagnosis/diagnoses of Mild protein-calorie malnutrition.    This patient is being managed with:   Diet Minced and Moist-  Entered: Jul 12 2022  2:01PM    
This patient has been assessed with a concern for Malnutrition and has been determined to have a diagnosis/diagnoses of Mild protein-calorie malnutrition.    This patient is being managed with:   Diet Minced and Moist-  Entered: Jul 4 2022  3:16PM    
This patient has been assessed with a concern for Malnutrition and has been determined to have a diagnosis/diagnoses of Mild protein-calorie malnutrition.    This patient is being managed with:   Diet Minced and Moist-  Entered: Jul 4 2022  3:16PM    
This patient has been assessed with a concern for Malnutrition and has been determined to have a diagnosis/diagnoses of Mild protein-calorie malnutrition.    This patient is being managed with:   Diet Minced and Moist-  1000mL Fluid Restriction ([id number])  Entered: Jul 14 2022  2:33PM    
This patient has been assessed with a concern for Malnutrition and has been determined to have a diagnosis/diagnoses of Mild protein-calorie malnutrition.    This patient is being managed with:   Diet Minced and Moist-  1000mL Fluid Restriction ([id number])  Entered: Jul 14 2022  2:33PM    
This patient has been assessed with a concern for Malnutrition and has been determined to have a diagnosis/diagnoses of Mild protein-calorie malnutrition.    This patient is being managed with:   Diet Minced and Moist-  Entered: Jul 4 2022  3:16PM

## 2022-07-19 NOTE — PROVIDER CONTACT NOTE (OTHER) - ACTION/TREATMENT ORDERED:
BEN Barcenas notified. IVF ordered, then canceled after conversation with sister Chela. Give patient PRN tylenol as needed.

## 2022-07-19 NOTE — PROGRESS NOTE ADULT - PROBLEM SELECTOR PLAN 1
CT chest with large R, moderate L pl effusions  -Effusions were small on CT A/P (lower chest) on admission  -Pt with no c/o dyspnea, breathing comfortably on RA. Will defer drainage at this time.   -Anasarca on exam   -TTE with LVOT, cards f/u   -Keep O>I as able  -Keep sats >90% with O2 PRN.  -Palliative recs noted  -D/c planning per primary team
CT chest with large R, moderate L pl effusions  -Effusions were small on CT A/P (lower chest) on admission  -Pt with no c/o dyspnea, breathing comfortably on RA. Will defer drainage at this time.   -Anasarca on exam   -TTE with LVOT  -Keep O>I as able  -Keep sats >90% with O2 PRN.  -Palliative recs noted
CT chest with large R, moderate L pl effusions  -Effusions were small on CT A/P (lower chest) on admission  -Pt with no c/o dyspnea, breathing comfortably on RA. Will defer drainage at this time.   -Anasarca on exam   -TTE with LVOT, cards f/u   -Keep O>I as able  -Keep sats >90% with O2 PRN.  -Palliative recs noted  -D/c planning per primary team
CT chest with large R, moderate L pl effusions  -Effusions were small on CT A/P (lower chest) on admission  -Pt with no c/o dyspnea, breathing comfortably on RA. Will defer drainage at this time.   -Anasarca on exam   -TTE with LVOT  -Keep O>I as able  -Keep sats >90% with O2 PRN.  -Palliative recs noted

## 2022-07-19 NOTE — PROGRESS NOTE ADULT - ASSESSMENT
81 year old female with PMHx of HTN, chronic back and leg pain (wheelchair bound at baseline) presents to ED c/o vomiting x 2 days.   Found to have a 5mm L distal stone on CT scan. Pt denies history of stones, no flank pain, dysuria, urgency, frequency or gross hematuria.      07/19-Patient hypotensive no fluid bolus   Actively dying   Family aware       Acute Hypoxic respiratory Failure  Fluid over load   pleural effusions   Hyponatremia       Patient Comfort care   Palliative care following   Discussed plan of care with patients Nephew   D/C planning to Home with Home Hosp[facility]

## 2022-07-19 NOTE — PROGRESS NOTE ADULT - SUBJECTIVE AND OBJECTIVE BOX
[facility]   MD ZANE SERRATO MD, DO KELLY CARIAS NP    TEL:  OFFICE: [phone]    From 5pm-7am Answering Service [phone]    -- RENAL FOLLOW UP NOTE ---Date of Service 07-19-22 @ 14:17    Patient is a 81y old  Female who presents with a chief complaint of vomiting (19 Jul 2022 11:14)      Patient seen and examined at bedside. No chest pain/sob    VITALS:  T(F): 97.5 (07-19-22 @ 09:00), Max: 97.5 (07-18-22 @ 17:10)  HR: 80 (07-19-22 @ 09:00)  BP: 91/62 (07-19-22 @ 09:00)  RR: 18 (07-19-22 @ 09:00)  SpO2: 94% (07-19-22 @ 09:00)  Wt(kg): --    07-18 @ 07:01  -  07-19 @ 07:00  --------------------------------------------------------  IN: 520 mL / OUT: 300 mL / NET: 220 mL    07-19 @ 07:01  -  07-19 @ 14:17  --------------------------------------------------------  IN: 240 mL / OUT: 0 mL / NET: 240 mL          PHYSICAL EXAM:  Constitutional: NAD  Neck: No JVD  Respiratory: CTAB, no wheezes, rales or rhonchi  Cardiovascular: S1, S2, RRR  Gastrointestinal: BS+, soft, NT/ND  Extremities: + peripheral edema    Hospital Medications:   MEDICATIONS  (STANDING):  dextrose 5%. 1000 milliLiter(s) (50 mL/Hr) IV Continuous <Continuous>  dextrose 5%. 1000 milliLiter(s) (100 mL/Hr) IV Continuous <Continuous>  dextrose 50% Injectable 12.5 Gram(s) IV Push once  dextrose 50% Injectable 25 Gram(s) IV Push once  dextrose 50% Injectable 25 Gram(s) IV Push once  glucagon  Injectable 1 milliGRAM(s) IntraMuscular once  heparin   Injectable 5000 Unit(s) SubCutaneous every 8 hours  metoclopramide 5 milliGRAM(s) Oral every 6 hours  metoprolol tartrate 12.5 milliGRAM(s) Oral two times a day  nystatin Powder 1 Application(s) Topical two times a day  polyethylene glycol 3350 17 Gram(s) Oral daily  senna 2 Tablet(s) Oral at bedtime  sodium bicarbonate 650 milliGRAM(s) Oral three times a day  sucralfate 1 Gram(s) Oral four times a day  tamsulosin 0.4 milliGRAM(s) Oral daily      LABS:        Creatinine Trend: 1.19 <--, 0.67 <--, 0.68 <--, 0.64 <--, 0.69 <--            Urine Studies:  Urinalysis - [07-10-22 @ 17:58]      Color Yellow / Appearance Slightly Turbid / SG 1.022 / pH 6.0      Gluc Negative / Ketone Negative  / Bili Negative / Urobili Negative       Blood Large / Protein 30 mg/dL / Leuk Est Large / Nitrite Negative       /  / Hyaline 0 / Gran  / Sq Epi  / Non Sq Epi 1 / Bacteria Few      TSH 6.11      [07-11-22 @ 12:19]        RADIOLOGY & ADDITIONAL STUDIES:

## 2022-07-19 NOTE — PROGRESS NOTE ADULT - SUBJECTIVE AND OBJECTIVE BOX
[facility]  Yannick Castillo PA-C  [address] [place] [address]  [phone]      INTERVAL HPI/OVERNIGHT EVENTS:  pt seen and examined, no new events      MEDICATIONS  (STANDING):  famotidine Injectable 20 milliGRAM(s) IV Push two times a day  heparin   Injectable 5000 Unit(s) SubCutaneous every 8 hours  metoclopramide 5 milliGRAM(s) Oral every 6 hours  polyethylene glycol 3350 17 Gram(s) Oral daily  senna 2 Tablet(s) Oral at bedtime  sodium bicarbonate  Infusion 0.23 mEq/kG/Hr (250 mL/Hr) IV Continuous <Continuous>  sucralfate 1 Gram(s) Oral four times a day  tamsulosin 0.4 milliGRAM(s) Oral daily    MEDICATIONS  (PRN):  acetaminophen     Tablet .. 650 milliGRAM(s) Oral every 6 hours PRN Temp greater or equal to 38C (100.4F), Mild Pain (1 - 3)  benzocaine 15 mG/menthol 3.6 mG Lozenge 1 Lozenge Oral three times a day PRN Sore Throat  guaiFENesin  milliGRAM(s) Oral every 12 hours PRN Cough  ondansetron Injectable 4 milliGRAM(s) IV Push every 6 hours PRN Nausea and/or Vomiting      Allergies    No Known Allergies    Intolerances        ROS:   unable to obtain       PHYSICAL EXAM:   Vital Signs Last 24 Hrs  T(C): 36.4 (2022 09:00), Max: 36.4 (2022 17:10)  T(F): 97.5 (2022 09:00), Max: 97.5 (2022 17:10)  HR: 80 (2022 09:00) (75 - 82)  BP: 91/62 (2022 09:00) (91/62 - 115/71)  BP(mean): --  RR: 18 (2022 09:00) (16 - 18)  SpO2: 94% (2022 09:00) (94% - 97%)    Parameters below as of 2022 09:00  Patient On (Oxygen Delivery Method): room air  Daily     Daily     GENERAL:  Appears stated age,   HEENT:  NC/AT,    CHEST:  Full & symmetric excursion,   HEART:  Regular rhythm,  ABDOMEN:  Soft, non-tender, non-distended,  EXTEREMITIES:  no cyanosis  SKIN:  No rash  NEURO:  Alert,       LABS:      Urinalysis Basic - ( 10 Jul 2022 17:58 )    Color: Yellow / Appearance: Slightly Turbid / S.022 / pH: x  Gluc: x / Ketone: Negative  / Bili: Negative / Urobili: Negative   Blood: x / Protein: 30 mg/dL / Nitrite: Negative   Leuk Esterase: Large / RBC: 241 /hpf /  /HPF   Sq Epi: x / Non Sq Epi: 1 /hpf / Bacteria: Few        RADIOLOGY & ADDITIONAL TESTS:

## 2022-07-19 NOTE — PROGRESS NOTE ADULT - ASSESSMENT
81 year old female with PMHx of HTN, chronic back and leg pain (wheelchair bound at baseline) presents to ED c/o vomiting x 2 days. Found to have a 5mm L distal stone on CT scan. S/P left ureteral stent, has UTI getting abx, has been having nausea/vomiting GI on board, has persistent hyponatremia    A/P:  Hyponatremia: Resolved.  Etiology?  No new labs.   urine study is inconclusive   hypervolemic - diuretics per cardiology   continue fluid restriction <1L a day   Na should not increase more than 6-8meq in 24hrs  monitor BMP closely     Acidosis:  High-AG  continue oral bicarb 650mg tid   monitor     Renal Insufficiency.   Scr increasing on 7/15.  Please get BMP in am.   Pt having poor po intake.

## 2022-07-19 NOTE — PROVIDER CONTACT NOTE (OTHER) - ASSESSMENT
pt in bed, continuously stating she feels like she's dying. Patient feels cool to touch. Unable to obtain BP electronically, manual BP assessed- the only pressure RN able to note was 65/45. Patient is on a 1L fluid restriction.

## 2022-07-19 NOTE — PROGRESS NOTE ADULT - SUBJECTIVE AND OBJECTIVE BOX
HPI:  81 year old female with PMHx of HTN, chronic back and leg pain (wheelchair bound at baseline) presents to ED c/o vomiting x 2 days. Today patient had wound care appointment for pressure wound to her R calf and vomited again so was brought in by aide. Found to have a 5mm L distal stone on CT scan. Pt denies history of stones, no flank pain, dysuria, urgency, frequency or gross hematuria.  (30 Jun 2022 17:15)  Patient hypotensive no fluid bolus as per family   Patient waiting home with Home hospice   No Known Allergies    Intolerances  PHYSICAL EXAM:  GENERAL: NAD, well-developed  HEAD:  Atraumatic, Normocephalic  EYES: EOMI, PERRLA, conjunctiva and sclera clear  NECK: Supple, No JVD  CHEST/LUNG: Clear to auscultation bilaterally; No wheeze  HEART: Regular rate and rhythm; No murmurs, rubs, or gallops  ABDOMEN: Soft, Nontender, Nondistended; Bowel sounds present  EXTREMITIES:  2+ Peripheral Pulses, No clubbing, cyanosis, or edema  PSYCH: patient lethargic   SKIN: No rashes or lesions                            no new labs

## 2022-07-19 NOTE — PROGRESS NOTE ADULT - PROBLEM SELECTOR PLAN 2
-5mm L distal stone on CT scan - s/p cysto and L ureteral stent  -Urine culture with klebsiella and enterococcus. S/p course of abx per ID.

## 2022-07-19 NOTE — DISCHARGE NOTE FOR THE EXPIRED PATIENT - HOSPITAL COURSE
81 year old female with PMHx of HTN, chronic back and leg pain (wheelchair bound at baseline) presents to ED c/o vomiting x 2 days. Found to have a 5mm L distal stone on CT scan. S/P left ureteral stent, had UTI and received antibiotics.  Patient's condition continued to worsen and patient was made comfort care and awaiting home hospice.  On  at 1920, patient .

## 2022-07-19 NOTE — PROGRESS NOTE ADULT - SUBJECTIVE AND OBJECTIVE BOX
Follow-up Pulm Progress Note    No new respiratory events overnight.  Denies SOB/CP.   Sats 95% RA    Medications:  MEDICATIONS  (STANDING):  dextrose 5%. 1000 milliLiter(s) (50 mL/Hr) IV Continuous <Continuous>  dextrose 5%. 1000 milliLiter(s) (100 mL/Hr) IV Continuous <Continuous>  dextrose 50% Injectable 25 Gram(s) IV Push once  dextrose 50% Injectable 12.5 Gram(s) IV Push once  dextrose 50% Injectable 25 Gram(s) IV Push once  glucagon  Injectable 1 milliGRAM(s) IntraMuscular once  heparin   Injectable 5000 Unit(s) SubCutaneous every 8 hours  metoclopramide 5 milliGRAM(s) Oral every 6 hours  metoprolol tartrate 12.5 milliGRAM(s) Oral two times a day  nystatin Powder 1 Application(s) Topical two times a day  polyethylene glycol 3350 17 Gram(s) Oral daily  senna 2 Tablet(s) Oral at bedtime  sodium bicarbonate 650 milliGRAM(s) Oral three times a day  sucralfate 1 Gram(s) Oral four times a day  tamsulosin 0.4 milliGRAM(s) Oral daily    MEDICATIONS  (PRN):  acetaminophen     Tablet .. 650 milliGRAM(s) Oral every 6 hours PRN Temp greater or equal to 38C (100.4F), Mild Pain (1 - 3)  benzocaine 15 mG/menthol 3.6 mG Lozenge 1 Lozenge Oral three times a day PRN Sore Throat  dextrose Oral Gel 15 Gram(s) Oral once PRN Blood Glucose LESS THAN 70 milliGRAM(s)/deciliter  guaiFENesin  milliGRAM(s) Oral every 12 hours PRN Cough  ondansetron Injectable 4 milliGRAM(s) IV Push every 6 hours PRN Nausea and/or Vomiting          Vital Signs Last 24 Hrs  T(C): 36.3 (19 Jul 2022 05:39), Max: 36.4 (18 Jul 2022 17:10)  T(F): 97.3 (19 Jul 2022 05:39), Max: 97.5 (18 Jul 2022 17:10)  HR: 82 (19 Jul 2022 05:39) (75 - 82)  BP: 96/56 (19 Jul 2022 05:39) (94/70 - 115/71)  BP(mean): --  RR: 18 (19 Jul 2022 05:39) (16 - 18)  SpO2: 95% (19 Jul 2022 05:39) (95% - 97%)    Parameters below as of 19 Jul 2022 05:39  Patient On (Oxygen Delivery Method): room air              07-18 @ 07:01  -  07-19 @ 07:00  --------------------------------------------------------  IN: 520 mL / OUT: 300 mL / NET: 220 mL          CULTURES:     Culture - Blood (collected 07-11-22 @ 12:05)  Source: .Blood Blood  Final Report (07-16-22 @ 17:00):    No Growth Final    Culture - Blood (collected 07-11-22 @ 11:58)  Source: .Blood Blood  Final Report (07-16-22 @ 17:00):    No Growth Final        Culture - Urine (collected 07-10-22 @ 17:58)  Source: Clean Catch Clean Catch (Midstream)  Final Report (07-12-22 @ 16:00):    10,000 - 49,000 CFU/mL Candida glabrata "Susceptibilities not performed"            Physical Examination:  PULM: decreased BS  CVS: S1, S2 heard      RADIOLOGY REVIEWED    CT chest: < from: CT Chest No Cont (07.12.22 @ 20:34) >  FINDINGS:    LYMPH NODES: No thoracic adenopathy    HEART/VASCULATURE: Heart size is normal. Small pericardial effusion.   Calcifications of the mitral annulus, aortic valve, aorta and coronary   arteries.    AIRWAYS/LUNGS/PLEURA: Central airways are patent. Expiratory motion   degraded study. Large pleural effusions. No pneumothorax.    UPPER ABDOMEN: Mild ascites. Small hiatal hernia. Partially visualized   left nephroureteral catheter.    BONES/SOFT TISSUES: Multilevel discogenic disease in the spine.   Osteopenia. Anasarca.    IMPRESSION:    Large pleural effusions. Small pericardial effusion. Mild ascites.   Anasarca.    < end of copied text >      TTE: < from: Transthoracic Echocardiogram (07.13.22 @ 11:17) >  Dimensions:    Normal Values:  LA:     2.8    2.0 - 4.0 cm  Ao:     3.0    2.0 - 3.8 cm  SEPTUM: 1.1    0.6 - 1.2 cm  PWT:    1.10.6 - 1.1 cm  LVIDd:  2.4    3.0 - 5.6 cm  LVIDs:         1.8 - 4.0 cm  Derived variables:  LVMI: 38 g/m2  RWT: 0.91  EF (Single Plane Ellipsoid): 73 %Doppler Peak Velocity  (m/sec): AoV=1.4  ------------------------------------------------------------------------  Observations:  Mitral Valve: Mitral annular calcification and calcified  mitral leaflets with decreased diastolic opening. Mild  mitral regurgitation.  Peak mitral valve gradient equals 11  mm Hg, mean transmitral valve gradient equals3 mm Hg,  consistent with mild mitral stenosis. Gradients are  measured at a HR of 93bpm.  Aortic Valve/Aorta: Aortic valve not well visualized;  appears calcified. Peak transaortic valve gradient equals 8  mm Hg. No aortic valve regurgitation seen. Peak left  ventricular outflow tract gradient equals 57 mm Hg, mean  gradient is equal to 4 mm Hg, LVOT velocity time integral  equals 27 cm, consistent with moderately severe LVOT  obstruction.  Aortic Root: 3 cm.  Left Atrium: Normal left atrium.  Left Ventricle: Hyperdynamic left ventricular systolic  function. Normal left ventricular internal dimensions and  wall thicknesses. Mild diastolic dysfunction (Stage I).  Right Heart: Normal right atrium. Normal right ventricular  size and function. Normal tricuspid valve. Mild tricuspid  regurgitation. Pulmonic valve not well visualized, probably  normal.  Pericardium/Pleura: Small pericardial effusion. The  effusion measures 0.7cm circumferentially.  No evidence of  right atrial or right ventricular collapse. No  echocardiographic evidence of pericardial tamponade.  Hemodynamic: Estimated right atrial pressure is 8 mm Hg.  Estimated right ventricular systolic pressure equals 33 mm  Hg, assuming right atrial pressure equals 8 mm Hg,  consistent with normal pulmonary pressures.  ------------------------------------------------------------------------  Conclusions:  1. Mitral annular calcification and calcified mitral  leaflets with decreased diastolic opening. Mild mitral  regurgitation.  Peak mitral valve gradient equals 11 mm Hg,  mean transmitral valve gradient equals 3 mm Hg, consistent  with mild mitral stenosis. Gradients are measured at a HR  of 93bpm.  2. Aortic valve not well visualized; appears calcified. No  aortic valve regurgitation seen.  3. Hyperdynamic left ventricular systolic function. Peak  left ventricular outflow tract gradient equals 57 mm Hg,  mean gradient is equal to 4 mm Hg, LVOT velocity time  integral equals 27 cm, consistent with moderately severe  LVOTobstruction.  4. Mild diastolic dysfunction (Stage I).  5. Normal right ventricular size and function.  6. Small pericardial effusion. The effusion measures 0.7cm  circumferentially.  No evidence of right atrial or right  ventricular collapse. No echocardiographic evidence of  pericardial tamponade.  *** No previous Echo exam.    < end of copied text >

## 2022-07-19 NOTE — PROGRESS NOTE ADULT - ASSESSMENT
81 year old female with PMHx of HTN, chronic back and leg pain (wheelchair bound at baseline), who presented with an obstructing kidney stone and rectal distention from stool, had a ureteral stent placed and has now finished 10 day of antibiotics.   More recently, she has been borderline hypoxic, and there was suggestion of volume overload, based on her exam and CXR.    - appears volume overloaded on exam, with anasarca  - CXR from 7/11 with vida effusions and pulmonary congestion  - got IV Lasix, but sodium trended down and Tolvaptam.   - echocardiogram results noted-hyperdynamic lv with mod-sev lvot obstruction  - noting anasarca, albumin 1.2 and echo findings, would suggest that the edema relates to hypoalbuminemia and not an underlying cardiac process  - would not diurese aggressively, so as to avoid worsening lvot obstr  - would allow intravascular volume to increase a bit  - Metoprolol 12.5 bid added.  Increase to 25 bid if able  -s he did not have a murmur at the time of my exam; the degree of obstruction will vary with bp and volume, and is probably not meaningful at this time  - cont to trend creatinine and sodium closely  - ekgs have documented paf, though most likely a sr/st with with apcs.   - further evaluation will depend on clinical course  - will follow with you.  pending d/c to home hospice.

## 2024-01-11 NOTE — PROVIDER CONTACT NOTE (HYPOGLYCEMIA EVENT) - NS PROVIDER CONTACT CONTRIBUTING FACTORS OF EPISODE
Poor oral intake within the last 24 hours
Discontinue Regimen: Hydroxychloroquine
Plan: Weight: 158 pounds\\nDiscussed continuing on oral medications versus topical medications.\\nPatient responded very well to Prednisone.\\nDiscussed starting systemic medications versus another course of Prednisone versus prescribing medications specifically for itch relief. \\nPaperwork for Dupixent filled out in office today. Bloodwork order given to patient. \\nPatient did not see any improvement with Hydroxychloroquine.
Render In Strict Bullet Format?: No
Initiate Treatment: Apply Clobetasol twice daily to affected areas on legs.
Detail Level: Zone
Continue Regimen: Apply Triderm to affected areas two to three times a day.

## 2024-09-27 NOTE — ED ADULT NURSE NOTE - ALCOHOL PRE SCREEN (AUDIT - C)
[FreeTextEntry2] : Pt counseled on proper diet and exercise. We discussed the importance of exercise in maintaining a healthy lifestyle. Statement Selected

## 2025-02-17 NOTE — PROVIDER CONTACT NOTE (CRITICAL VALUE NOTIFICATION) - TEST AND RESULT REPORTED:
Patient ID: Hallie Gilbert is a 59 y.o. female.    Chief Complaint: No chief complaint on file.    History of Present Illness              Physical Exam              Assessment & Plan               1. Encounter for physical examination related to employment        No follow-ups on file.    This note was generated with the assistance of ambient listening technology. Verbal consent was obtained by the patient and accompanying visitor(s) for the recording of patient appointment to facilitate this note. I attest to having reviewed and edited the generated note for accuracy, though some syntax or spelling errors may persist. Please contact the author of this note for any clarification.    
Sodium 118
Potassium (2.7) and magnesium (1.0)
Potassium 8.0  Potassium 8.0/ Calcium <3
BG 45 on BMP
Potassium 2.7

## (undated) DEVICE — PACK CYSTO

## (undated) DEVICE — GLV 8 PROTEXIS (CREAM) NEU-THERA

## (undated) DEVICE — SOL IRR POUR H2O 1500ML

## (undated) DEVICE — DRAPE COVER DOME COVEX 27"

## (undated) DEVICE — SOL IRR BAG NS 0.9% 3000ML

## (undated) DEVICE — SYR LUER LOK 10CC

## (undated) DEVICE — GOWN TRIMAX LG

## (undated) DEVICE — ACMI SELF-SEALING SEAL UP TO 7FR

## (undated) DEVICE — POSITIONER FOAM EGG CRATE ULNAR 2PCS (PINK)

## (undated) DEVICE — VENODYNE/SCD SLEEVE CALF LARGE

## (undated) DEVICE — BAG URINE W METER 2L

## (undated) DEVICE — GLV 6.5 PROTEXIS (WHITE)

## (undated) DEVICE — WARMING BLANKET UPPER ADULT

## (undated) DEVICE — GLV 7.5 PROTEXIS (WHITE)

## (undated) DEVICE — DRAPE DRAINAGE BAG RELAX & GEMINI

## (undated) DEVICE — GLV 7 PROTEXIS (WHITE)